# Patient Record
Sex: MALE | Race: WHITE | NOT HISPANIC OR LATINO | Employment: OTHER | URBAN - METROPOLITAN AREA
[De-identification: names, ages, dates, MRNs, and addresses within clinical notes are randomized per-mention and may not be internally consistent; named-entity substitution may affect disease eponyms.]

---

## 2018-02-07 ENCOUNTER — CONVERSION ENCOUNTER (OUTPATIENT)
Dept: FAMILY MEDICINE CLINIC | Facility: CLINIC | Age: 68
End: 2018-02-07

## 2018-02-07 ENCOUNTER — OFFICE VISIT CONVERTED (OUTPATIENT)
Dept: FAMILY MEDICINE CLINIC | Facility: CLINIC | Age: 68
End: 2018-02-07
Attending: NURSE PRACTITIONER

## 2018-02-13 ENCOUNTER — OFFICE VISIT CONVERTED (OUTPATIENT)
Dept: FAMILY MEDICINE CLINIC | Facility: CLINIC | Age: 68
End: 2018-02-13
Attending: FAMILY MEDICINE

## 2018-03-01 ENCOUNTER — OFFICE VISIT CONVERTED (OUTPATIENT)
Dept: FAMILY MEDICINE CLINIC | Facility: CLINIC | Age: 68
End: 2018-03-01
Attending: FAMILY MEDICINE

## 2018-03-27 ENCOUNTER — OFFICE VISIT CONVERTED (OUTPATIENT)
Dept: FAMILY MEDICINE CLINIC | Facility: CLINIC | Age: 68
End: 2018-03-27
Attending: FAMILY MEDICINE

## 2018-03-27 ENCOUNTER — CONVERSION ENCOUNTER (OUTPATIENT)
Dept: FAMILY MEDICINE CLINIC | Facility: CLINIC | Age: 68
End: 2018-03-27

## 2018-04-04 ENCOUNTER — OFFICE VISIT CONVERTED (OUTPATIENT)
Dept: GASTROENTEROLOGY | Facility: CLINIC | Age: 68
End: 2018-04-04
Attending: PHYSICIAN ASSISTANT

## 2018-08-21 ENCOUNTER — OFFICE VISIT CONVERTED (OUTPATIENT)
Dept: FAMILY MEDICINE CLINIC | Facility: CLINIC | Age: 68
End: 2018-08-21
Attending: FAMILY MEDICINE

## 2018-09-04 ENCOUNTER — CONVERSION ENCOUNTER (OUTPATIENT)
Dept: ORTHOPEDIC SURGERY | Facility: CLINIC | Age: 68
End: 2018-09-04

## 2018-09-04 ENCOUNTER — OFFICE VISIT CONVERTED (OUTPATIENT)
Dept: ORTHOPEDIC SURGERY | Facility: CLINIC | Age: 68
End: 2018-09-04
Attending: ORTHOPAEDIC SURGERY

## 2018-11-08 ENCOUNTER — OFFICE VISIT CONVERTED (OUTPATIENT)
Dept: ORTHOPEDIC SURGERY | Facility: CLINIC | Age: 68
End: 2018-11-08
Attending: ORTHOPAEDIC SURGERY

## 2019-02-19 ENCOUNTER — OFFICE VISIT CONVERTED (OUTPATIENT)
Dept: FAMILY MEDICINE CLINIC | Facility: CLINIC | Age: 69
End: 2019-02-19
Attending: FAMILY MEDICINE

## 2019-02-19 ENCOUNTER — HOSPITAL ENCOUNTER (OUTPATIENT)
Dept: FAMILY MEDICINE CLINIC | Facility: CLINIC | Age: 69
Discharge: HOME | End: 2019-02-19
Attending: FAMILY MEDICINE

## 2019-02-19 LAB
ALBUMIN SERPL-MCNC: 4.5 G/DL (ref 3.5–5)
ALBUMIN/GLOB SERPL: 1.7 {RATIO} (ref 1.4–2.6)
ALP SERPL-CCNC: 66 U/L (ref 56–155)
ALT SERPL-CCNC: 29 U/L (ref 10–40)
ANION GAP SERPL CALC-SCNC: 16 MMOL/L (ref 8–19)
APPEARANCE UR: CLEAR
AST SERPL-CCNC: 23 U/L (ref 15–50)
BASOPHILS # BLD AUTO: 0.02 10*3/UL (ref 0–0.2)
BASOPHILS NFR BLD AUTO: 0.4 % (ref 0–3)
BILIRUB SERPL-MCNC: 0.61 MG/DL (ref 0.2–1.3)
BILIRUB UR QL STRIP: NEGATIVE
BUN SERPL-MCNC: 12 MG/DL (ref 5–25)
BUN/CREAT SERPL: 13 {RATIO} (ref 6–20)
CALCIUM SERPL-MCNC: 9.3 MG/DL (ref 8.7–10.4)
CHLORIDE SERPL-SCNC: 103 MMOL/L (ref 99–111)
CHOLEST SERPL-MCNC: 137 MG/DL (ref 107–200)
CHOLEST/HDLC SERPL: 3.4 {RATIO} (ref 3–6)
COLOR UR: YELLOW
CONV ABS IMM GRAN: 0.02 10*3/UL (ref 0–0.2)
CONV CO2: 28 MMOL/L (ref 22–32)
CONV COLLECTION SOURCE (UA): ABNORMAL
CONV IMMATURE GRAN: 0.4 % (ref 0–1.8)
CONV TOTAL PROTEIN: 7.1 G/DL (ref 6.3–8.2)
CONV UROBILINOGEN IN URINE BY AUTOMATED TEST STRIP: 0.2 {EHRLICHU}/DL (ref 0.1–1)
CREAT UR-MCNC: 0.93 MG/DL (ref 0.7–1.2)
DEPRECATED RDW RBC AUTO: 45.1 FL (ref 35.1–43.9)
EOSINOPHIL # BLD AUTO: 0.23 10*3/UL (ref 0–0.7)
EOSINOPHIL # BLD AUTO: 4.4 % (ref 0–7)
ERYTHROCYTE [DISTWIDTH] IN BLOOD BY AUTOMATED COUNT: 12.5 % (ref 11.6–14.4)
GFR SERPLBLD BASED ON 1.73 SQ M-ARVRAT: >60 ML/MIN/{1.73_M2}
GLOBULIN UR ELPH-MCNC: 2.6 G/DL (ref 2–3.5)
GLUCOSE SERPL-MCNC: 109 MG/DL (ref 70–99)
GLUCOSE UR QL: NEGATIVE MG/DL
HBA1C MFR BLD: 14.8 G/DL (ref 14–18)
HCT VFR BLD AUTO: 46.3 % (ref 42–52)
HDLC SERPL-MCNC: 40 MG/DL (ref 40–60)
HGB UR QL STRIP: ABNORMAL
KETONES UR QL STRIP: NEGATIVE MG/DL
LDLC SERPL CALC-MCNC: 71 MG/DL (ref 70–100)
LYMPHOCYTES # BLD AUTO: 1.19 10*3/UL (ref 1–5)
Lab: NEGATIVE
MCH RBC QN AUTO: 30.9 PG (ref 27–31)
MCHC RBC AUTO-ENTMCNC: 32 G/DL (ref 33–37)
MCV RBC AUTO: 96.7 FL (ref 80–96)
MONOCYTES # BLD AUTO: 0.38 10*3/UL (ref 0.2–1.2)
MONOCYTES NFR BLD AUTO: 7.3 % (ref 3–10)
NEUTROPHILS # BLD AUTO: 3.39 10*3/UL (ref 2–8)
NEUTROPHILS NFR BLD AUTO: 64.7 % (ref 30–85)
NITRITE UR QL STRIP: NEGATIVE
NRBC CBCN: 0 % (ref 0–0.7)
OSMOLALITY SERPL CALC.SUM OF ELEC: 294 MOSM/KG (ref 273–304)
PH UR: 6.5 [PH] (ref 5–8)
PLATELET # BLD AUTO: 189 10*3/UL (ref 130–400)
PMV BLD AUTO: 11 FL (ref 9.4–12.4)
POTASSIUM SERPL-SCNC: 5 MMOL/L (ref 3.5–5.3)
PROT UR-MCNC: NEGATIVE MG/DL
RBC # BLD AUTO: 4.79 10*6/UL (ref 4.7–6.1)
SODIUM SERPL-SCNC: 142 MMOL/L (ref 135–147)
SP GR UR: 1.01 (ref 1–1.03)
TRIGL SERPL-MCNC: 130 MG/DL (ref 40–150)
TSH SERPL-ACNC: 2.76 M[IU]/L (ref 0.27–4.2)
URATE SERPL-MCNC: 6.1 MG/DL (ref 3.5–8.5)
VARIANT LYMPHS NFR BLD MANUAL: 22.8 % (ref 20–45)
VLDLC SERPL-MCNC: 26 MG/DL (ref 5–37)
WBC # BLD AUTO: 5.23 10*3/UL (ref 4.8–10.8)

## 2019-03-19 ENCOUNTER — OFFICE VISIT CONVERTED (OUTPATIENT)
Dept: FAMILY MEDICINE CLINIC | Facility: CLINIC | Age: 69
End: 2019-03-19
Attending: FAMILY MEDICINE

## 2019-03-19 ENCOUNTER — HOSPITAL ENCOUNTER (OUTPATIENT)
Dept: FAMILY MEDICINE CLINIC | Facility: CLINIC | Age: 69
Discharge: HOME | End: 2019-03-19
Attending: FAMILY MEDICINE

## 2019-03-19 ENCOUNTER — CONVERSION ENCOUNTER (OUTPATIENT)
Dept: FAMILY MEDICINE CLINIC | Facility: CLINIC | Age: 69
End: 2019-03-19

## 2019-03-19 LAB
EST. AVERAGE GLUCOSE BLD GHB EST-MCNC: 108 MG/DL
HBA1C MFR BLD: 5.4 % (ref 3.5–5.7)

## 2019-08-19 ENCOUNTER — HOSPITAL ENCOUNTER (OUTPATIENT)
Dept: FAMILY MEDICINE CLINIC | Facility: CLINIC | Age: 69
Discharge: HOME | End: 2019-08-19
Attending: FAMILY MEDICINE

## 2019-08-19 ENCOUNTER — OFFICE VISIT CONVERTED (OUTPATIENT)
Dept: FAMILY MEDICINE CLINIC | Facility: CLINIC | Age: 69
End: 2019-08-19
Attending: FAMILY MEDICINE

## 2019-08-19 LAB
ALBUMIN SERPL-MCNC: 4.6 G/DL (ref 3.5–5)
ALBUMIN/GLOB SERPL: 1.8 {RATIO} (ref 1.4–2.6)
ALP SERPL-CCNC: 69 U/L (ref 56–155)
ALT SERPL-CCNC: 29 U/L (ref 10–40)
ANION GAP SERPL CALC-SCNC: 21 MMOL/L (ref 8–19)
AST SERPL-CCNC: 20 U/L (ref 15–50)
BASOPHILS # BLD AUTO: 0.02 10*3/UL (ref 0–0.2)
BASOPHILS NFR BLD AUTO: 0.3 % (ref 0–3)
BILIRUB SERPL-MCNC: 0.52 MG/DL (ref 0.2–1.3)
BUN SERPL-MCNC: 14 MG/DL (ref 5–25)
BUN/CREAT SERPL: 16 {RATIO} (ref 6–20)
CALCIUM SERPL-MCNC: 9.5 MG/DL (ref 8.7–10.4)
CHLORIDE SERPL-SCNC: 100 MMOL/L (ref 99–111)
CHOLEST SERPL-MCNC: 141 MG/DL (ref 107–200)
CHOLEST/HDLC SERPL: 3.4 {RATIO} (ref 3–6)
CONV ABS IMM GRAN: 0.05 10*3/UL (ref 0–0.2)
CONV CO2: 24 MMOL/L (ref 22–32)
CONV IMMATURE GRAN: 0.8 % (ref 0–1.8)
CONV TOTAL PROTEIN: 7.1 G/DL (ref 6.3–8.2)
CREAT UR-MCNC: 0.87 MG/DL (ref 0.7–1.2)
DEPRECATED RDW RBC AUTO: 46.1 FL (ref 35.1–43.9)
EOSINOPHIL # BLD AUTO: 0.2 10*3/UL (ref 0–0.7)
EOSINOPHIL # BLD AUTO: 3.1 % (ref 0–7)
ERYTHROCYTE [DISTWIDTH] IN BLOOD BY AUTOMATED COUNT: 13.1 % (ref 11.6–14.4)
GFR SERPLBLD BASED ON 1.73 SQ M-ARVRAT: >60 ML/MIN/{1.73_M2}
GLOBULIN UR ELPH-MCNC: 2.5 G/DL (ref 2–3.5)
GLUCOSE SERPL-MCNC: 104 MG/DL (ref 70–99)
HCT VFR BLD AUTO: 46 % (ref 42–52)
HDLC SERPL-MCNC: 41 MG/DL (ref 40–60)
HGB BLD-MCNC: 15.1 G/DL (ref 14–18)
LDLC SERPL CALC-MCNC: 72 MG/DL (ref 70–100)
LYMPHOCYTES # BLD AUTO: 1.38 10*3/UL (ref 1–5)
LYMPHOCYTES NFR BLD AUTO: 21.3 % (ref 20–45)
MCH RBC QN AUTO: 32.2 PG (ref 27–31)
MCHC RBC AUTO-ENTMCNC: 32.8 G/DL (ref 33–37)
MCV RBC AUTO: 98.1 FL (ref 80–96)
MONOCYTES # BLD AUTO: 0.48 10*3/UL (ref 0.2–1.2)
MONOCYTES NFR BLD AUTO: 7.4 % (ref 3–10)
NEUTROPHILS # BLD AUTO: 4.34 10*3/UL (ref 2–8)
NEUTROPHILS NFR BLD AUTO: 67.1 % (ref 30–85)
NRBC CBCN: 0 % (ref 0–0.7)
OSMOLALITY SERPL CALC.SUM OF ELEC: 291 MOSM/KG (ref 273–304)
PLATELET # BLD AUTO: 164 10*3/UL (ref 130–400)
PMV BLD AUTO: 11.1 FL (ref 9.4–12.4)
POTASSIUM SERPL-SCNC: 4.9 MMOL/L (ref 3.5–5.3)
RBC # BLD AUTO: 4.69 10*6/UL (ref 4.7–6.1)
SODIUM SERPL-SCNC: 140 MMOL/L (ref 135–147)
TRIGL SERPL-MCNC: 141 MG/DL (ref 40–150)
VLDLC SERPL-MCNC: 28 MG/DL (ref 5–37)
WBC # BLD AUTO: 6.47 10*3/UL (ref 4.8–10.8)

## 2020-03-03 ENCOUNTER — OFFICE VISIT CONVERTED (OUTPATIENT)
Dept: FAMILY MEDICINE CLINIC | Facility: CLINIC | Age: 70
End: 2020-03-03
Attending: FAMILY MEDICINE

## 2020-03-03 ENCOUNTER — HOSPITAL ENCOUNTER (OUTPATIENT)
Dept: FAMILY MEDICINE CLINIC | Facility: CLINIC | Age: 70
Discharge: HOME | End: 2020-03-03
Attending: FAMILY MEDICINE

## 2020-03-03 ENCOUNTER — CONVERSION ENCOUNTER (OUTPATIENT)
Dept: FAMILY MEDICINE CLINIC | Facility: CLINIC | Age: 70
End: 2020-03-03

## 2020-03-03 LAB
ALBUMIN SERPL-MCNC: 4.6 G/DL (ref 3.5–5)
ALBUMIN/GLOB SERPL: 1.8 {RATIO} (ref 1.4–2.6)
ALP SERPL-CCNC: 61 U/L (ref 56–155)
ALT SERPL-CCNC: 35 U/L (ref 10–40)
ANION GAP SERPL CALC-SCNC: 18 MMOL/L (ref 8–19)
AST SERPL-CCNC: 21 U/L (ref 15–50)
BASOPHILS # BLD AUTO: 0.03 10*3/UL (ref 0–0.2)
BASOPHILS NFR BLD AUTO: 0.5 % (ref 0–3)
BILIRUB SERPL-MCNC: 0.68 MG/DL (ref 0.2–1.3)
BUN SERPL-MCNC: 15 MG/DL (ref 5–25)
BUN/CREAT SERPL: 14 {RATIO} (ref 6–20)
CALCIUM SERPL-MCNC: 9.7 MG/DL (ref 8.7–10.4)
CHLORIDE SERPL-SCNC: 99 MMOL/L (ref 99–111)
CHOLEST SERPL-MCNC: 151 MG/DL (ref 107–200)
CHOLEST/HDLC SERPL: 4.2 {RATIO} (ref 3–6)
CK SERPL-CCNC: 54 U/L (ref 57–374)
CONV ABS IMM GRAN: 0.02 10*3/UL (ref 0–0.2)
CONV CO2: 27 MMOL/L (ref 22–32)
CONV IMMATURE GRAN: 0.3 % (ref 0–1.8)
CONV TOTAL PROTEIN: 7.1 G/DL (ref 6.3–8.2)
CREAT UR-MCNC: 1.04 MG/DL (ref 0.7–1.2)
DEPRECATED RDW RBC AUTO: 43 FL (ref 35.1–43.9)
EOSINOPHIL # BLD AUTO: 0.34 10*3/UL (ref 0–0.7)
EOSINOPHIL # BLD AUTO: 5.4 % (ref 0–7)
ERYTHROCYTE [DISTWIDTH] IN BLOOD BY AUTOMATED COUNT: 12.3 % (ref 11.6–14.4)
GFR SERPLBLD BASED ON 1.73 SQ M-ARVRAT: >60 ML/MIN/{1.73_M2}
GLOBULIN UR ELPH-MCNC: 2.5 G/DL (ref 2–3.5)
GLUCOSE SERPL-MCNC: 108 MG/DL (ref 70–99)
HCT VFR BLD AUTO: 45.3 % (ref 42–52)
HDLC SERPL-MCNC: 36 MG/DL (ref 40–60)
HGB BLD-MCNC: 15.1 G/DL (ref 14–18)
LDLC SERPL CALC-MCNC: 73 MG/DL (ref 70–100)
LYMPHOCYTES # BLD AUTO: 1.33 10*3/UL (ref 1–5)
LYMPHOCYTES NFR BLD AUTO: 21.2 % (ref 20–45)
MAGNESIUM SERPL-MCNC: 2.15 MG/DL (ref 1.6–2.3)
MCH RBC QN AUTO: 31.8 PG (ref 27–31)
MCHC RBC AUTO-ENTMCNC: 33.3 G/DL (ref 33–37)
MCV RBC AUTO: 95.4 FL (ref 80–96)
MONOCYTES # BLD AUTO: 0.43 10*3/UL (ref 0.2–1.2)
MONOCYTES NFR BLD AUTO: 6.8 % (ref 3–10)
NEUTROPHILS # BLD AUTO: 4.13 10*3/UL (ref 2–8)
NEUTROPHILS NFR BLD AUTO: 65.8 % (ref 30–85)
NRBC CBCN: 0 % (ref 0–0.7)
OSMOLALITY SERPL CALC.SUM OF ELEC: 289 MOSM/KG (ref 273–304)
PLATELET # BLD AUTO: 160 10*3/UL (ref 130–400)
PMV BLD AUTO: 10.6 FL (ref 9.4–12.4)
POTASSIUM SERPL-SCNC: 4.7 MMOL/L (ref 3.5–5.3)
RBC # BLD AUTO: 4.75 10*6/UL (ref 4.7–6.1)
SODIUM SERPL-SCNC: 139 MMOL/L (ref 135–147)
T4 FREE SERPL-MCNC: 1.3 NG/DL (ref 0.9–1.8)
TRIGL SERPL-MCNC: 208 MG/DL (ref 40–150)
TSH SERPL-ACNC: 3.81 M[IU]/L (ref 0.27–4.2)
URATE SERPL-MCNC: 6.5 MG/DL (ref 3.5–8.5)
VLDLC SERPL-MCNC: 42 MG/DL (ref 5–37)
WBC # BLD AUTO: 6.28 10*3/UL (ref 4.8–10.8)

## 2020-03-16 ENCOUNTER — OFFICE VISIT CONVERTED (OUTPATIENT)
Dept: FAMILY MEDICINE CLINIC | Facility: CLINIC | Age: 70
End: 2020-03-16
Attending: FAMILY MEDICINE

## 2020-06-25 ENCOUNTER — CONVERSION ENCOUNTER (OUTPATIENT)
Dept: FAMILY MEDICINE CLINIC | Facility: CLINIC | Age: 70
End: 2020-06-25

## 2020-06-25 ENCOUNTER — OFFICE VISIT CONVERTED (OUTPATIENT)
Dept: FAMILY MEDICINE CLINIC | Facility: CLINIC | Age: 70
End: 2020-06-25
Attending: FAMILY MEDICINE

## 2020-06-25 ENCOUNTER — HOSPITAL ENCOUNTER (OUTPATIENT)
Dept: FAMILY MEDICINE CLINIC | Facility: CLINIC | Age: 70
Discharge: HOME | End: 2020-06-25
Attending: FAMILY MEDICINE

## 2020-06-25 LAB — PSA SERPL-MCNC: 3.83 NG/ML (ref 0–4)

## 2020-06-30 ENCOUNTER — HOSPITAL ENCOUNTER (OUTPATIENT)
Dept: GASTROENTEROLOGY | Facility: HOSPITAL | Age: 70
Setting detail: HOSPITAL OUTPATIENT SURGERY
Discharge: HOME | End: 2020-06-30
Attending: INTERNAL MEDICINE

## 2020-06-30 LAB — SARS-COV-2 RNA SPEC QL NAA+PROBE: NOT DETECTED

## 2021-04-01 ENCOUNTER — OFFICE VISIT CONVERTED (OUTPATIENT)
Dept: FAMILY MEDICINE CLINIC | Facility: CLINIC | Age: 71
End: 2021-04-01
Attending: FAMILY MEDICINE

## 2021-04-01 ENCOUNTER — HOSPITAL ENCOUNTER (OUTPATIENT)
Dept: FAMILY MEDICINE CLINIC | Facility: CLINIC | Age: 71
Discharge: HOME | End: 2021-04-01
Attending: FAMILY MEDICINE

## 2021-04-01 LAB
ALBUMIN SERPL-MCNC: 4.5 G/DL (ref 3.5–5)
ALBUMIN/GLOB SERPL: 1.8 {RATIO} (ref 1.4–2.6)
ALP SERPL-CCNC: 66 U/L (ref 56–155)
ALT SERPL-CCNC: 22 U/L (ref 10–40)
ANION GAP SERPL CALC-SCNC: 14 MMOL/L (ref 8–19)
AST SERPL-CCNC: 18 U/L (ref 15–50)
BASOPHILS # BLD AUTO: 0.04 10*3/UL (ref 0–0.2)
BASOPHILS NFR BLD AUTO: 0.6 % (ref 0–3)
BILIRUB SERPL-MCNC: 0.62 MG/DL (ref 0.2–1.3)
BUN SERPL-MCNC: 11 MG/DL (ref 5–25)
BUN/CREAT SERPL: 11 {RATIO} (ref 6–20)
CALCIUM SERPL-MCNC: 9.1 MG/DL (ref 8.7–10.4)
CHLORIDE SERPL-SCNC: 103 MMOL/L (ref 99–111)
CHOLEST SERPL-MCNC: 162 MG/DL (ref 107–200)
CHOLEST/HDLC SERPL: 4.4 {RATIO} (ref 3–6)
CONV ABS IMM GRAN: 0.05 10*3/UL (ref 0–0.2)
CONV CO2: 26 MMOL/L (ref 22–32)
CONV IMMATURE GRAN: 0.7 % (ref 0–1.8)
CONV TOTAL PROTEIN: 7 G/DL (ref 6.3–8.2)
CREAT UR-MCNC: 1.01 MG/DL (ref 0.7–1.2)
DEPRECATED RDW RBC AUTO: 43.7 FL (ref 35.1–43.9)
EOSINOPHIL # BLD AUTO: 0.35 10*3/UL (ref 0–0.7)
EOSINOPHIL # BLD AUTO: 5.1 % (ref 0–7)
ERYTHROCYTE [DISTWIDTH] IN BLOOD BY AUTOMATED COUNT: 12.6 % (ref 11.6–14.4)
GFR SERPLBLD BASED ON 1.73 SQ M-ARVRAT: >60 ML/MIN/{1.73_M2}
GLOBULIN UR ELPH-MCNC: 2.5 G/DL (ref 2–3.5)
GLUCOSE SERPL-MCNC: 116 MG/DL (ref 70–99)
HCT VFR BLD AUTO: 44.9 % (ref 42–52)
HDLC SERPL-MCNC: 37 MG/DL (ref 40–60)
HGB BLD-MCNC: 14.7 G/DL (ref 14–18)
LDLC SERPL CALC-MCNC: 88 MG/DL (ref 70–100)
LYMPHOCYTES # BLD AUTO: 1.48 10*3/UL (ref 1–5)
LYMPHOCYTES NFR BLD AUTO: 21.8 % (ref 20–45)
MCH RBC QN AUTO: 30.9 PG (ref 27–31)
MCHC RBC AUTO-ENTMCNC: 32.7 G/DL (ref 33–37)
MCV RBC AUTO: 94.3 FL (ref 80–96)
MONOCYTES # BLD AUTO: 0.42 10*3/UL (ref 0.2–1.2)
MONOCYTES NFR BLD AUTO: 6.2 % (ref 3–10)
NEUTROPHILS # BLD AUTO: 4.46 10*3/UL (ref 2–8)
NEUTROPHILS NFR BLD AUTO: 65.6 % (ref 30–85)
NRBC CBCN: 0 % (ref 0–0.7)
OSMOLALITY SERPL CALC.SUM OF ELEC: 288 MOSM/KG (ref 273–304)
PLATELET # BLD AUTO: 152 10*3/UL (ref 130–400)
PMV BLD AUTO: 10.7 FL (ref 9.4–12.4)
POTASSIUM SERPL-SCNC: 4.3 MMOL/L (ref 3.5–5.3)
RBC # BLD AUTO: 4.76 10*6/UL (ref 4.7–6.1)
SODIUM SERPL-SCNC: 139 MMOL/L (ref 135–147)
TRIGL SERPL-MCNC: 185 MG/DL (ref 40–150)
URATE SERPL-MCNC: 6.5 MG/DL (ref 3.5–8.5)
VLDLC SERPL-MCNC: 37 MG/DL (ref 5–37)
WBC # BLD AUTO: 6.8 10*3/UL (ref 4.8–10.8)

## 2021-05-07 NOTE — PROGRESS NOTES
Progress Note      Patient Name: Arley Falk   Patient ID: [id number]   Sex: Male   YOB: 1950    Referring Provider: Alfred Church MD    Visit Date: August 21, 2018    Provider: Alfred Church MD   Location: [facility]   Location Address: [address] [place] [address]  [id number]   Location Phone: [phone]          Chief Complaint     here for refills and labs, also thinks he's having gout flare up.       History Of Present Illness  Arley Falk is a 67 year old /White male who presents for evaluation and treatment of:      needs refills on meds  pt has medial and posterior knee pain x 2 months- sudden onset- worsening symptoms- meds not helping  needs to have gout checked  HTN- controlled on med- no chest pain or headaches  gout- unknown control- need to check uric acid    xrays:no fxs       Past Medical History  Disease Name Date Onset Notes   Enlarged prostate --  --    Gout --  --    Hypertension, Benign Essential --  --          Past Surgical History  Procedure Name Date Notes   Leg Surgery --  --          Medication List  Name Date Started Instructions   allopurinol 100 mg oral tablet 02/13/2018 take 1 tablet (100 mg) by oral route once daily for 90 days   amlodipine 5 mg oral tablet 02/13/2018 take 1 tablet (5 mg) by oral route once daily for 90 days   Levitra 20 mg oral tablet 02/13/2018 take 1 tablet (20 mg) by oral route once daily as needed approximately 1 hour before sexual activity for 90 days   lisinopril 20 mg oral tablet 02/13/2018 take 1 tablet (20 mg) by oral route once daily for 90 days   Ventolin HFA 90 mcg/actuation inhalation HFA aerosol inhaler 02/07/2018 inhale 1 - 2 puffs (90 - 180 mcg) by inhalation route every 4-6 hours as needed for 10 days         Allergy List  Allergen Name Date Reaction Notes   NO KNOWN DRUG ALLERGIES --  --  --          Family Medical History  Disease Name Relative/Age Notes   Diabetes / --     Hyperlipidemia / Mother    Mother/    Hypertension / --          Social History  Finding Status Start/Stop Quantity Notes   Exercises regularly --  --/-- --  occasionally   Recreational Drug Use Never --/-- --  never used   Second hand smoke exposure Unknown --/-- --  no   Tobacco Former --/30 --  quit smoking when he was 30, quit chewing at age 64, in the past used other tobacco products         Review of Systems  · Constitutional  o Denies  o : fatigue, fever  · Cardiovascular  o Denies  o : chest pain, palpitations  · Respiratory  o Denies  o : shortness of breath, cough  · Gastrointestinal  o Denies  o : nausea, vomiting, diarrhea  · Musculoskeletal  o Admits  o : knee pain      Vitals  Date Time BP Position Site L\R Cuff Size HR RR TEMP(F) WT  HT  BMI kg/m2 BSA m2 O2 Sat HC       08/21/2018 08:23 /80 Sitting    84 - R  97.6 285lbs 0oz    96 %           Physical Examination  · Constitutional  o Appearance  o : well developed, well-nourished, in no acute distress  · Respiratory  o Respiratory Effort  o : breathing unlabored  o Auscultation of Lungs  o : clear to ascultation  · Cardiovascular  o Heart  o :   § Auscultation of Heart  § : regular rate and rhythm  o Peripheral Vascular System  o :   § Extremities  § : no edema  · Gastrointestinal  o Abdomen  o : soft, non-tender, non-distended, + bowel sounds, no hepatosplenomegaly, no masses palpated  · Musculoskeletal  o General  o :   § General Musculoskeletal  § : No joint swelling or deformity., Muscle tone, strength, and development grossly normal. Left knee mild swelling, tenderness medial and posterior knee, normal ROM, stable, no redness or warmth.  · Neurologic  o Gait and Station  o :   § Gait Screening  § : normal gait  · Psychiatric  o Mood and Affect  o : mood normal, affect appropriate          Assessment  · Essential hypertension     401.9/I10  · Gout     274.9/M10.9  · Left knee pain     719.46/M25.562  · Screening PSA (prostate specific  antigen)     V76.44/Z12.5      Plan  · Orders  o CBC with Auto Diff [facility] ([id number]) - 719.46/M25.562, 274.9/M10.9, 401.9/I10 - 08/21/2018  o CMP [facility] (75132) - 719.46/M25.562, 274.9/M10.9, 401.9/I10 - 08/21/2018  o Lipid Panel [facility] (83605) - 719.46/M25.562, 274.9/M10.9, 401.9/I10 - 08/21/2018  o TSH [facility] (04723) - 719.46/M25.562, 274.9/M10.9, 401.9/I10 - 08/21/2018  o ACO-39: Current medications updated and reviewed () - - 08/21/2018  o Xray knee left [facility] ([id number]-UR) - 719.46/M25.562, 274.9/M10.9, 401.9/I10 - 08/21/2018  o Uric Acid Serum [facility] (25431) - 719.46/M25.562, 274.9/M10.9, 401.9/I10 - 08/21/2018  o PSA Screening, Ultrasensitive, MEDICARE [facility] () - V76.44/Z12.5 - 08/21/2018  o ORTHOPEDIC CONSULTATION (ORTHO) - 719.46/M25.562 - 08/21/2018  · Medications  o Celebrex 200 mg oral capsule   SIG: take 1 capsule (200 mg) by oral route once daily for 10 days   DISP: (10) capsules with 0 refills  Prescribed on 08/21/2018     o allopurinol 100 mg oral tablet   SIG: take 1 tablet (100 mg) by oral route once daily for 90 days   DISP: (90) tablets with 1 refills  Adjusted on 08/21/2018     o amlodipine 5 mg oral tablet   SIG: take 1 tablet (5 mg) by oral route once daily for 90 days   DISP: (90) tablets with 1 refills  Adjusted on 08/21/2018     o lisinopril 20 mg oral tablet   SIG: take 1 tablet (20 mg) by oral route once daily for 90 days   DISP: (90) tablets with 1 refills  Adjusted on 08/21/2018     · Instructions  o Handouts were given to patient: diet.  o Take all medications as prescribed/directed.  o Patient instructed/educated on their diet and exercise program.  o Patient was educated/instructed on their diagnosis, treatment and medications prior to discharge from the clinic today.            Electronically Signed by: Alfred Church MD -Author on August 21, 2018 09:24:12 AM

## 2021-05-07 NOTE — PROGRESS NOTES
Progress Note      Patient Name: Arley Falk   Patient ID: [id number]   Sex: Male   YOB: 1950    Referring Provider: Alfred Church MD    Visit Date: August 19, 2019    Provider: Alfred Church MD   Location: [facility]   Location Address: [address] [place] [address] [place]  [id number]   Location Phone: [phone]          Chief Complaint     Refills and check up       History Of Present Illness  Arley Falk is a 68 year old /White male who presents for evaluation and treatment of:      need refills on meds  HTN- controlled on meds  arthritis pain is controlled on meds  pt tolerating meds well       Past Medical History  Disease Name Date Onset Notes   Enlarged prostate --  --    Gout --  --    Hypertension, Benign Essential --  --          Past Surgical History  Procedure Name Date Notes   Leg Surgery --  --          Medication List  Name Date Started Instructions   allopurinol 100 mg oral tablet 02/19/2019 take 1 tablet (100 mg) by oral route once daily for 90 days   amlodipine 5 mg oral tablet 02/19/2019 take 1 tablet (5 mg) by oral route once daily for 90 days   Flomax 0.4 mg oral capsule 08/12/2019 take 1 capsule (0.4 mg) by oral route once daily 1/2 hour following the same meal each day for 30 days   Levitra 20 mg oral tablet 02/19/2019 take 1 tablet (20 mg) by oral route once daily as needed approximately 1 hour before sexual activity for 90 days   lisinopril 20 mg oral tablet 02/19/2019 take 1 tablet (20 mg) by oral route once daily for 90 days   Mobic 7.5 mg oral tablet 02/19/2019 take 1 tablet (7.5 mg) by oral route once daily for 90 days         Allergy List  Allergen Name Date Reaction Notes   NO KNOWN DRUG ALLERGIES --  --  --          Family Medical History  Disease Name Relative/Age Notes   Hyperlipidemia Mother/   Mother   Hypertension  --    Diabetes  --          Social History  Finding Status Start/Stop Quantity Notes   Exercises  regularly --  --/-- --  occasionally   Recreational Drug Use Never --/-- --  never used   Second hand smoke exposure Unknown --/-- --  no   Tobacco Former --/30 --  quit smoking when he was 30, quit chewing at age 64, in the past used other tobacco products         Review of Systems  · Constitutional  o Denies  o : fatigue, fever  · Cardiovascular  o Denies  o : chest pain, palpitations  · Respiratory  o Denies  o : shortness of breath, cough  · Gastrointestinal  o Denies  o : nausea, vomiting, diarrhea  · Psychiatric  o Denies  o : anxiety, depression      Vitals  Date Time BP Position Site L\R Cuff Size HR RR TEMP (F) WT  HT  BMI kg/m2 BSA m2 O2 Sat HC       08/19/2019 08:50 /80 Sitting    77 - R  97.8 287lbs 2oz    90 %          Physical Examination  · Constitutional  o Appearance  o : well developed, well-nourished, in no acute distress  · Respiratory  o Respiratory Effort  o : breathing unlabored  o Auscultation of Lungs  o : clear to ascultation  · Cardiovascular  o Heart  o :   § Auscultation of Heart  § : regular rate and rhythm  o Peripheral Vascular System  o :   § Extremities  § : no edema  · Gastrointestinal  o Abdomen  o : soft, non-tender, non-distended, + bowel sounds, no hepatosplenomegaly, no masses palpated  · Musculoskeletal  o General  o :   § General Musculoskeletal  § : No joint swelling or deformity., Muscle tone, strength, and development grossly normal.  · Neurologic  o Gait and Station  o :   § Gait Screening  § : normal gait  · Psychiatric  o Mood and Affect  o : mood normal, affect appropriate          Assessment  · Essential hypertension     401.9/I10      Plan  · Orders  o CBC with Auto Diff [facility] (05218) - 401.9/I10 - 08/19/2019  o CMP [facility] (87070) - 401.9/I10 - 08/19/2019  o Lipid Panel [facility] (88257) - 401.9/I10 - 08/19/2019  o ACO-39: Current medications updated and reviewed () - - 08/19/2019  · Medications  o allopurinol 100 mg oral tablet   SIG: take 1 tablet (100 mg) by oral  route once daily for 90 days   DISP: (90) tablets with 1 refills  Adjusted on 08/19/2019     o amlodipine 5 mg oral tablet   SIG: take 1 tablet (5 mg) by oral route once daily for 90 days   DISP: (90) tablets with 1 refills  Adjusted on 08/19/2019     o Flomax 0.4 mg oral capsule   SIG: take 1 capsule (0.4 mg) by oral route once daily 1/2 hour following the same meal each day for 30 days   DISP: (90) Capsule with 1 refills  Adjusted on 08/19/2019     o Levitra 20 mg oral tablet   SIG: take 1 tablet (20 mg) by oral route once daily as needed approximately 1 hour before sexual activity for 90 days   DISP: (30) tablets with 3 refills  Adjusted on 08/19/2019     o lisinopril 20 mg oral tablet   SIG: take 1 tablet (20 mg) by oral route once daily for 90 days   DISP: (90) tablets with 1 refills  Adjusted on 08/19/2019     o Mobic 7.5 mg oral tablet   SIG: take 1 tablet (7.5 mg) by oral route once daily for 90 days   DISP: (90) tablets with 1 refills  Adjusted on 08/19/2019     · Instructions  o Patient was educated/instructed on their diagnosis, treatment and medications prior to discharge from the clinic today.            Electronically Signed by: Alfred Church MD -Author on August 19, 2019 09:09:28 AM

## 2021-05-07 NOTE — PROGRESS NOTES
Progress Note      Patient Name: Arley Falk   Patient ID: [id number]   Sex: Male   YOB: 1950        Visit Date: February 7, 2018    Provider: Angelica Cain MD   Location: [facility]   Location Address: [address] [place] [address]  [id number]   Location Phone: [phone]          Chief Complaint     cough and congestion       History Of Present Illness  Arley Falk is a 67 year old /White male who presents for evaluation and treatment of:      not feeling well. He states he felt okay yesterday, but not so good today.     Started out as a sinus infection about a week and a half ago--had some head congestion and drainage, but that stopped. Now it feels like he is breathing okay, but he isn't getting any air. He has wheezing when he gets to coughing. He is not able to expectorate anything. He has had slight sore throat--but not a lot. He has had decreased appetite--no nausea, vomiting, or diarrhea. He hasn't had a fever.     He is a former smoker--quit 30 years ago--never been told that he had COPD, emphysema, or asthma. He does burn a wood stove at home. He has a CPAP at home--he is sleeping well.       Past Medical History  Disease Name Date Onset Notes   Enlarged prostate --  --    Gout --  --    Hypertension, Benign Essential --  --          Medication List  Name Date Started Instructions   allopurinol 100 mg oral tablet  take 1 tablet (100 mg) by oral route once daily for 90 days   amlodipine 5 mg oral tablet  take 1 tablet (5 mg) by oral route once daily for 90 days   lisinopril 20 mg oral tablet  take 1 tablet (20 mg) by oral route once daily for 90 days         Allergy List  Allergen Name Date Reaction Notes   NO KNOWN DRUG ALLERGIES --  --  --          Family Medical History  Disease Name Relative/Age Notes   Diabetes / --    Hyperlipidemia / Mother    Mother/    Hypertension / --          Social History  Finding Status Start/Stop  "Quantity Notes   Exercises regularly --  --/-- --  occasionally   Recreational Drug Use Never --/-- --  never used   Second hand smoke exposure Unknown --/-- --  no   Tobacco Former --/-- --  in the past used other tobacco products         Review of Systems  · Constitutional  o Admits  o : fatigue  o Denies  o : fever, chills, body aches  · HENT  o Admits  o : postnasal drip, sore throat  o Denies  o : headaches, vertigo, lightheadedness, sinus pain, nasal congestion, nasal discharge, tinnitus, ear pain, ear fullness  · Cardiovascular  o Denies  o : chest pain, irregular heart beats, syncope, dyspnea on exertion  · Respiratory  o Admits  o : wheezing, cough  o Denies  o : shortness of breath  · Gastrointestinal  o Admits  o : loss of appetite  o Denies  o : nausea, vomiting, diarrhea, abdominal pain  · Genitourinary  o Denies  o : dysuria, urinary retention  · Integument  o Denies  o : rash      Vitals  Date Time BP Position Site L\R Cuff Size HR RR TEMP(F) WT  HT  BMI kg/m2 BSA m2 O2 Sat        02/07/2018 02:11 /90 Sitting    82 - R  97.4 288lbs 0oz 5'  10\" 41.32 2.54 98 %           Physical Examination  · Constitutional  o Appearance  o : well developed, well-nourished, in no acute distress  · Eyes  o Conjunctivae  o : conjunctivae normal, no exudates present  o Pupils and Irises  o : pupils equal and round, pupils reactive to light bilaterally  · Ears, Nose, Mouth and Throat  o Ears  o :   § External Ears  § : auricle appearance normal bilaterally, no auricle tenderness to palpation present, external auditory canal appearance within normal limits  § Otoscopic Examination  § : tympanic membrane appearance within normal limits bilaterally  o Nose  o :   § External Nose  § : appearance normal  § Intranasal Exam  § : mucosa within normal limits, sinuses non tender to percussion  o Throat  o :   § Oropharynx  § : no inflammation or lesions present, tonsils within normal limits; moderate drainage in the " posterior oropharynx  · Neck  o Inspection/Palpation  o : normal appearance, no masses or tenderness, trachea midline  · Respiratory  o Respiratory Effort  o : breathing unlabored  o Auscultation of Lungs  o : scant, scattered expiratory wheezes, but otherwise CTA  · Cardiovascular  o Heart  o :   § Auscultation of Heart  § : regular rate and rhythm  o Peripheral Vascular System  o :   § Extremities  § : no edema  · Lymphatic  o Neck  o : no lymphadenopathy present  · Musculoskeletal  o General  o :   § General Musculoskeletal  § : No joint swelling or deformity., Muscle tone, strength, and development grossly normal.  · Skin and Subcutaneous Tissue  o General Inspection  o : no lesions present, no areas of discoloration, skin turgor normal, texture normal  · Neurologic  o Gait and Station  o :   § Gait Screening  § : normal gait  · Psychiatric  o Mood and Affect  o : mood normal, affect appropriate              Assessment  · Bronchitis, acute     466.0/J20.9      Plan  · Orders  o ACO-39: Current medications updated and reviewed () - - 02/07/2018  o Influenza immunization was not ordered or administered for reasons documented by clinician () - - 02/07/2018   Out of stock  · Medications  o azithromycin 250 mg oral tablet   SIG: take 2 tablets (500 mg) by oral route once daily for 1 day then 1 tablet (250 mg) by oral route once daily for 4 days   DISP: (6) tablets with 0 refills  Prescribed on 02/07/2018     o Medrol (Anthony) 4 mg oral tablets,dose pack   SIG: take as directed for 6 days   DISP: (1) 21 ct dose-pack with 0 refills  Prescribed on 02/07/2018     o Ventolin HFA 90 mcg/actuation inhalation HFA aerosol inhaler   SIG: inhale 1 - 2 puffs (90 - 180 mcg) by inhalation route every 4-6 hours as needed for 10 days   DISP: (1) 8 gm canister with 0 refills  Prescribed on 02/07/2018     · Instructions  o Take all medications as prescribed/directed.  o Rest. Increase Fluids.  o Patient was educated/instructed  on their diagnosis, treatment and medications prior to discharge from the clinic today. Advised that he follow up in the office if no improvement in symptoms and we would consider CXR.  o Chronic conditions reviewed and taken into consideration for today's treatment plan.  · Disposition  o Call or Return if symptoms worsen or persist.            Electronically Signed by: BRADFORD Cooper -Author on February 7, 2018 02:40:24 PM

## 2021-05-07 NOTE — PROGRESS NOTES
Progress Note      Patient Name: Arley Falk   Patient ID: [id number]   Sex: Male   YOB: 1950    Referring Provider: Alfred Church MD    Visit Date: March 19, 2019    Provider: Alfred Church MD   Location: [facility] [place]   Location Address: [address] [place] [address]  [id number]   Location Phone: [phone]          Chief Complaint     follow up for abnormal lab letter       History Of Present Illness  Arley Falk is a 68 year old /White male who presents for evaluation and treatment of:      discussed labs  blood glucose slightly elevated- need to get HGM A1C- counseled on diet and exercise  pt has had allergic rhinitis x several weeks       Past Medical History  Disease Name Date Onset Notes   Enlarged prostate --  --    Gout --  --    Hypertension, Benign Essential --  --          Past Surgical History  Procedure Name Date Notes   Leg Surgery --  --          Medication List  Name Date Started Instructions   allopurinol 100 mg oral tablet 02/19/2019 take 1 tablet (100 mg) by oral route once daily for 90 days   amlodipine 5 mg oral tablet 02/19/2019 take 1 tablet (5 mg) by oral route once daily for 90 days   Flomax 0.4 mg oral capsule 02/19/2019 take 1 capsule (0.4 mg) by oral route once daily 1/2 hour following the same meal each day for 30 days   Levitra 20 mg oral tablet 02/19/2019 take 1 tablet (20 mg) by oral route once daily as needed approximately 1 hour before sexual activity for 90 days   lisinopril 20 mg oral tablet 02/19/2019 take 1 tablet (20 mg) by oral route once daily for 90 days   Mobic 7.5 mg oral tablet 02/19/2019 take 1 tablet (7.5 mg) by oral route once daily for 90 days   Ventolin HFA 90 mcg/actuation inhalation HFA aerosol inhaler 02/19/2019 inhale 1 - 2 puffs (90 - 180 mcg) by inhalation route every 4-6 hours as needed for 90 days         Allergy List  Allergen Name Date Reaction Notes   NO KNOWN DRUG ALLERGIES --  --   "--          Family Medical History  Disease Name Relative/Age Notes   Hyperlipidemia Mother/   Mother   Hypertension  --    Diabetes  --          Social History  Finding Status Start/Stop Quantity Notes   Exercises regularly --  --/-- --  occasionally   Recreational Drug Use Never --/-- --  never used   Second hand smoke exposure Unknown --/-- --  no   Tobacco Former --/30 --  quit smoking when he was 30, quit chewing at age 64, in the past used other tobacco products         Review of Systems  · Constitutional  o Denies  o : fatigue, fever  · HENT  o Admits  o : nasal congestion, nasal discharge  o Denies  o : sore throat  · Cardiovascular  o Denies  o : chest pain, palpitations  · Respiratory  o Denies  o : shortness of breath, cough  · Gastrointestinal  o Denies  o : nausea, vomiting, diarrhea      Vitals  Date Time BP Position Site L\R Cuff Size HR RR TEMP (F) WT  HT  BMI kg/m2 BSA m2 O2 Sat HC       03/19/2019 08:06 /84 Sitting    77 - R  97 283lbs 6oz 5'  10\" 40.66 2.52 98 %          Physical Examination  · Constitutional  o Appearance  o : well developed, well-nourished, in no acute distress  · Head and Face  o HEENT  o : Unremarkable  · Respiratory  o Respiratory Effort  o : breathing unlabored  o Auscultation of Lungs  o : clear to ascultation  · Cardiovascular  o Heart  o :   § Auscultation of Heart  § : regular rate and rhythm  o Peripheral Vascular System  o :   § Extremities  § : no edema  · Gastrointestinal  o Abdomen  o : soft, non-tender, non-distended, + bowel sounds, no hepatosplenomegaly, no masses palpated  · Musculoskeletal  o General  o :   § General Musculoskeletal  § : No joint swelling or deformity., Muscle tone, strength, and development grossly normal.  · Neurologic  o Gait and Station  o :   § Gait Screening  § : normal gait  · Psychiatric  o Mood and Affect  o : mood normal, affect appropriate          Assessment  · Elevated random blood glucose level     790.29/R73.09  · Allergic " rhinitis     477.9/J30.9      Plan  · Orders  o Hgb A1c [facility] ([id number]) - 790.29/R73.09 - 03/19/2019  o ACO-39: Current medications updated and reviewed () - - 03/19/2019  · Medications  o Singulair 10 mg oral tablet   SIG: take 1 tablet (10 mg) by oral route once daily in the evening for 90 days   DISP: (90) tablets with 1 refills  Prescribed on 03/19/2019     · Instructions  o Handouts were given to patient: diet.  o Patient instructed/educated on their diet and exercise program.  o Patient was educated/instructed on their diagnosis, treatment and medications prior to discharge from the clinic today.            Electronically Signed by: Alfred Church MD -Author on March 19, 2019 08:23:16 AM

## 2021-05-07 NOTE — PROGRESS NOTES
Progress Note      Patient Name: Arley Falk   Patient ID: [id number]   Sex: Male   YOB: 1950        Visit Date: March 1, 2018    Provider: Alfred Church MD   Location: [facility], [place]   Location Address: [address] [place] [address]  [id number]   Location Phone: [phone]          Chief Complaint     follow up on labs       History Of Present Illness  Arley Falk is a 67 year old /White male who presents for evaluation and treatment of:      discussed labs  hyperlipidemia- slightly uncontrolled- pt watching diet more and exercising more now- discussed this  elevated liver enzyme  HTN- controlled  gave diet for cholesterol       Past Medical History  Disease Name Date Onset Notes   Enlarged prostate --  --    Gout --  --    Hypertension, Benign Essential --  --          Medication List  Name Date Started Instructions   allopurinol 100 mg oral tablet 02/13/2018 take 1 tablet (100 mg) by oral route once daily for 90 days   amlodipine 5 mg oral tablet 02/13/2018 take 1 tablet (5 mg) by oral route once daily for 90 days   Levitra 20 mg oral tablet 02/13/2018 take 1 tablet (20 mg) by oral route once daily as needed approximately 1 hour before sexual activity for 90 days   lisinopril 20 mg oral tablet 02/13/2018 take 1 tablet (20 mg) by oral route once daily for 90 days   Ventolin HFA 90 mcg/actuation inhalation HFA aerosol inhaler 02/07/2018 inhale 1 - 2 puffs (90 - 180 mcg) by inhalation route every 4-6 hours as needed for 10 days         Allergy List  Allergen Name Date Reaction Notes   NO KNOWN DRUG ALLERGIES --  --  --          Family Medical History  Disease Name Relative/Age Notes   Diabetes / --    Hyperlipidemia / Mother    Mother/    Hypertension / --          Social History  Finding Status Start/Stop Quantity Notes   Exercises regularly --  --/-- --  occasionally   Recreational Drug Use Never --/-- --  never used   Second hand smoke exposure  "Unknown --/-- --  no   Tobacco Former --/-- --  in the past used other tobacco products         Vitals  Date Time BP Position Site L\R Cuff Size HR RR TEMP(F) WT  HT  BMI kg/m2 BSA m2 O2 Sat HC       03/01/2018 08:41 /78 Sitting    83 - R  97.6 288lbs 4oz 5'  10\" 41.36 2.54 100 %           Physical Examination  · Constitutional  o Appearance  o : well developed, well-nourished, in no acute distress  · Eyes  o Conjunctivae  o : conjunctivae normal  · Neck  o Inspection/Palpation  o : supple  o Thyroid  o : no thyromegaly  · Respiratory  o Respiratory Effort  o : breathing unlabored  o Auscultation of Lungs  o : clear to ascultation  · Cardiovascular  o Heart  o :   § Auscultation of Heart  § : regular rate and rhythm  o Peripheral Vascular System  o :   § Extremities  § : no edema  · Lymphatic  o Neck  o : no lymphadenopathy present  · Musculoskeletal  o General  o :   § General Musculoskeletal  § : No joint swelling or deformity., Muscle tone, strength, and development grossly normal.  · Skin and Subcutaneous Tissue  o General Inspection  o : no lesions present, no areas of discoloration, skin turgor normal, texture normal  · Neurologic  o Gait and Station  o :   § Gait Screening  § : normal gait  · Psychiatric  o Mood and Affect  o : mood normal, affect appropriate          Assessment  · Hyperlipidemia     272.4/E78.5  · Hypertension, Benign Essential     401.1/I10  · Elevated liver enzymes     790.5/R74.8      Plan  · Orders  o ACO-39: Current medications updated and reviewed () - - 03/01/2018  o Influenza immunization was not ordered or administered for reasons documented by clinician () - - 03/01/2018  o Hepatic function panel (98663) - 401.1/I10, 272.4/E78.5, 790.5/R74.8 - 03/01/2018  o Hepatitis panel (HBsAg + ab, HBe ag + ab, HAV ab IgM + total, HBc ab, HCV ab) (59701, 28605, 68674, 30468, 07590) - 401.1/I10, 272.4/E78.5, 790.5/R74.8 - 03/01/2018  o US abdomen (60235, 38386) - 790.5/R74.8 - " 03/01/2018   need U/S of liver  · Instructions  o Advised that cheeses and other sources of dairy fats, animal fats, fast food, and the extras (candy, pasteries, pies, doughnuts and cookies) all contain LDL raising nutrients. Advised to increase fruits, vegetables, whole grains, and to monitor portion sizes.   o Handouts were given to patient: cholesterol  o Take all medications as prescribed/directed.  o Patient instructed/educated on their diet and exercise program.  o Patient was educated/instructed on their diagnosis, treatment and medications prior to discharge from the clinic today.            Electronically Signed by: Alfred Church MD -Author on March 1, 2018 09:39:58 AM

## 2021-05-07 NOTE — PROGRESS NOTES
Progress Note      Patient Name: Arley Falk   Patient ID: [id number]   Sex: Male   YOB: 1950    Referring Provider: Alfred Church MD    Visit Date: February 19, 2019    Provider: Alfred Church MD   Location: [facility]   Location Address: [address] [place] [address]  [id number]   Location Phone: [phone]          Chief Complaint     here for medication refills and labs       History Of Present Illness  Arley Falk is a 68 year old /White male who presents for evaluation and treatment of:      need refills on meds  HTN: controlled on meds  pt seeing urology for prostrate- pt has some mild decrease in urine stream  pt is on Mobic for knee pain- controlled on med       Past Medical History  Disease Name Date Onset Notes   Enlarged prostate --  --    Gout --  --    Hypertension, Benign Essential --  --          Past Surgical History  Procedure Name Date Notes   Leg Surgery --  --          Medication List  Name Date Started Instructions   allopurinol 100 mg oral tablet 08/21/2018 take 1 tablet (100 mg) by oral route once daily for 90 days   amlodipine 5 mg oral tablet 08/21/2018 take 1 tablet (5 mg) by oral route once daily for 90 days   Celebrex 200 mg oral capsule 08/21/2018 take 1 capsule (200 mg) by oral route once daily for 10 days   Levitra 20 mg oral tablet 02/13/2018 take 1 tablet (20 mg) by oral route once daily as needed approximately 1 hour before sexual activity for 90 days   lisinopril 20 mg oral tablet 08/21/2018 take 1 tablet (20 mg) by oral route once daily for 90 days   Ventolin HFA 90 mcg/actuation inhalation HFA aerosol inhaler 02/07/2018 inhale 1 - 2 puffs (90 - 180 mcg) by inhalation route every 4-6 hours as needed for 10 days         Allergy List  Allergen Name Date Reaction Notes   NO KNOWN DRUG ALLERGIES --  --  --          Family Medical History  Disease Name Relative/Age Notes   Diabetes / --    Hyperlipidemia /  Mother    Mother/    Hypertension / --          Social History  Finding Status Start/Stop Quantity Notes   Exercises regularly --  --/-- --  occasionally   Recreational Drug Use Never --/-- --  never used   Second hand smoke exposure Unknown --/-- --  no   Tobacco Former --/30 --  quit smoking when he was 30, quit chewing at age 64, in the past used other tobacco products         Vitals  Date Time BP Position Site L\R Cuff Size HR RR TEMP(F) WT  HT  BMI kg/m2 BSA m2 O2 Sat HC       02/19/2019 08:40 /80 Sitting    75 - R  97.4 283lbs 0oz    94 %           Physical Examination  · Constitutional  o Appearance  o : well developed, well-nourished, in no acute distress  · Head and Face  o HEENT  o : Unremarkable  · Respiratory  o Respiratory Effort  o : breathing unlabored  o Auscultation of Lungs  o : clear to ascultation  · Cardiovascular  o Heart  o :   § Auscultation of Heart  § : regular rate and rhythm  o Peripheral Vascular System  o :   § Extremities  § : no edema  · Gastrointestinal  o Abdomen  o : soft, non-tender, non-distended, + bowel sounds, no hepatosplenomegaly, no masses palpated  · Musculoskeletal  o General  o :   § General Musculoskeletal  § : No joint swelling or deformity., Muscle tone, strength, and development grossly normal.  · Neurologic  o Gait and Station  o :   § Gait Screening  § : normal gait  · Psychiatric  o Mood and Affect  o : mood normal, affect appropriate          Assessment  · BPH (benign prostatic hyperplasia)     600.00/N40.0  · Essential hypertension     401.9/I10  · Gout     274.9/M10.9  · Arthritis     716.90/M19.90      Plan  · Orders  o CBC with Auto Diff [facility] (12070) - 600.00/N40.0, 716.90/M19.90, 401.9/I10 - 02/19/2019  o CMP [facility] (77232) - 600.00/N40.0, 716.90/M19.90, 401.9/I10 - 02/19/2019  o Lipid Panel [facility] (20838) - 600.00/N40.0, 716.90/M19.90, 401.9/I10 - 02/19/2019  o TSH [facility] (81061) - 600.00/N40.0, 716.90/M19.90, 401.9/I10 - 02/19/2019  o IOP - Urinalysis without  Microscopy (Clinitek) [facility] (94860) - 600.00/N40.0, 716.90/M19.90, 401.9/I10 - 02/19/2019   GLU neg, JAVON neg, keT neg, SG 1.015, blO trace-intact, pH 6.5, PRO neg, URO 0.2 E.U./dL, NIT neg, JONATHON neg  o ACO-39: Current medications updated and reviewed () - - 02/19/2019  o Uric Acid Serum [facility] (79142) - 274.9/M10.9 - 02/19/2019  · Medications  o Flomax 0.4 mg oral capsule   SIG: take 1 capsule (0.4 mg) by oral route once daily 1/2 hour following the same meal each day for 30 days   DISP: (30) capsules with 5 refills  Prescribed on 02/19/2019     o Mobic 7.5 mg oral tablet   SIG: take 1 tablet (7.5 mg) by oral route once daily for 90 days   DISP: (90) tablets with 1 refills  Prescribed on 02/19/2019     o allopurinol 100 mg oral tablet   SIG: take 1 tablet (100 mg) by oral route once daily for 90 days   DISP: (90) tablets with 1 refills  Adjusted on 02/19/2019     o amlodipine 5 mg oral tablet   SIG: take 1 tablet (5 mg) by oral route once daily for 90 days   DISP: (90) tablets with 1 refills  Adjusted on 02/19/2019     o Levitra 20 mg oral tablet   SIG: take 1 tablet (20 mg) by oral route once daily as needed approximately 1 hour before sexual activity for 90 days   DISP: (30) tablets with 3 refills  Adjusted on 02/19/2019     o lisinopril 20 mg oral tablet   SIG: take 1 tablet (20 mg) by oral route once daily for 90 days   DISP: (90) tablets with 1 refills  Adjusted on 02/19/2019     o Ventolin HFA 90 mcg/actuation inhalation HFA aerosol inhaler   SIG: inhale 1 - 2 puffs (90 - 180 mcg) by inhalation route every 4-6 hours as needed for 90 days   DISP: (3) 8 gm canister with 3 refills  Adjusted on 02/19/2019     o Celebrex 200 mg oral capsule   SIG: take 1 capsule (200 mg) by oral route once daily for 10 days   DISP: (10) capsules with 0 refills  Discontinued on 02/19/2019     · Instructions  o Patient was educated/instructed on their diagnosis, treatment and medications prior to discharge from the clinic  today.            Electronically Signed by: Alfred Church MD -Author on February 19, 2019 09:17:05 AM

## 2021-05-07 NOTE — PROGRESS NOTES
Progress Note      Patient Name: Arley Falk   Patient ID: [id number]   Sex: Male   YOB: 1950    Referring Provider: Alfred Church MD    Visit Date: June 25, 2020    Provider: Alfred Church MD   Location: [facility]   Location Address: [address] [place] [address] [place]  [id number]   Location Phone: [phone]          Chief Complaint  · Annual Wellness Exam      History Of Present Illness  The patient is a 69 year old /White male who has come to this office for his Annual Wellness Visit.   His Primary Care Provider is Alfred Church MD. His comprehensive Care Team list, including suppliers, has been updated on the Facesheet. His medical/family history, height, weight, BMI, and blood pressure have been reviewed and are in the chart. The Health Risk Assessment has been completed and scanned in the chart.   Medications are listed in the medication list.   The active problem list includes: Enlarged prostate, Gout, and Hypertension, Benign Essential   The patient does have a history of substance use. This includes tobacco use and Quit 30 years ago.   Patient reports his diet is adequate.   The Mini-Cog has been administered and is scanned in chart. The results are negative. His cognitive function is without limitation.   A hearing loss screen was completed today and the result is negative.   Patient does not have any risk factors for depression. Patient completed the PHQ-9 today and it has been scanned in the chart. The total score is 1-4.   The Timed Up and Go screen was administered today and the result is negative.   The Joe Index of [place] in ADLs indicated full function (score of 6).   A Falls Risk Assessment has been completed, including a review of home fall hazards and medication review.   Overall, the patient's functional ability is noted by this provider to be within normal limits. His level of safety is noted to be within normal limits. His  balance/gait is within normal limits. There have been no falls in the past year. Patient-specific home safety recommendations have been reviewed and a copy has been given to patient.   He denies issues with leaking urine.   There are no additional risk factors identified.   Living Will/Advanced Directive has not previously been completed.   Personalized health advice was given to the patient and a written health screening schedule was established; see Plan for details.       Past Medical History  Disease Name Date Onset Notes   Enlarged prostate --  --    Gout --  --    Hypertension, Benign Essential --  --          Past Surgical History  Procedure Name Date Notes   Colonoscopy 01-19-17 --    Leg Surgery --  --          Medication List  Name Date Started Instructions   allopurinol 100 mg oral tablet 03/03/2020 take 1 tablet (100 mg) by oral route once daily for 90 days   amlodipine 5 mg oral tablet 03/03/2020 take 1 tablet (5 mg) by oral route once daily for 90 days   Fish Oil 1,000 mg (120 mg-180 mg) oral capsule 03/16/2020 take 2 capsules by oral route 2 times a day for 90 days   Flomax 0.4 mg oral capsule 03/03/2020 take 1 capsule (0.4 mg) by oral route once daily 1/2 hour following the same meal each day for 30 days   Levitra 20 mg oral tablet 08/19/2019 take 1 tablet (20 mg) by oral route once daily as needed approximately 1 hour before sexual activity for 90 days   lisinopril 20 mg oral tablet 03/03/2020 take 1 tablet (20 mg) by oral route once daily for 90 days   Mobic 7.5 mg oral tablet 03/03/2020 take 1 tablet (7.5 mg) by oral route once daily for 90 days   prednisone 20 mg oral tablet 03/16/2020 take 2 tablets by oral route daily for 5 days   Singulair 10 mg oral tablet 03/03/2020 take 1 tablet (10 mg) by oral route once daily in the evening for 90 days   vitamin E 600 unit oral capsule  take 1 capsule by oral route daily         Allergy List  Allergen Name Date Reaction Notes   NO KNOWN DRUG ALLERGIES --   "--  --        Allergies Reconciled  Family Medical History  Disease Name Relative/Age Notes   Hyperlipidemia Mother/   Mother   Hypertension  --    Diabetes  --          Social History  Finding Status Start/Stop Quantity Notes   Exercises regularly --  --/-- --  occasionally   Recreational Drug Use Never --/-- --  never used   Second hand smoke exposure Unknown --/-- --  no   Tobacco Former --/30 --  quit smoking when he was 30, quit chewing at age 64, in the past used other tobacco products         Immunizations  NameDate Admin Mfg Trade Name Lot Number Route Inj VIS Given VIS Publication   Alcciscou29/15/2019 UNK Unknown TradeName [id number] NE NE 03/16/2020    Comments: [facility]   Cfuajmwef2396/18/2020 MSD PNEUMOVAX 23 [id number] IM  03/18/2020    Comments: [facility]DC#[id number]         Review of Systems  · Constitutional  o Denies  o : fatigue, fever  · Cardiovascular  o Denies  o : chest pain, palpitations  · Respiratory  o Denies  o : shortness of breath, cough  · Psychiatric  o Denies  o : anxiety, depression      Vitals  Date Time BP Position Site L\R Cuff Size HR RR TEMP (F) WT  HT  BMI kg/m2 BSA m2 O2 Sat HC       06/25/2020 10:20 /80 Sitting    86 - R  99.1 285lbs 0oz 5'  9.5\" 41.48 2.52 96 %    06/25/2020 10:20 /80 Sitting    86 - R  99.1 285lbs 0oz 5'  9.5\" 41.48 2.52 96 %          Physical Examination  · Respiratory  o Respiratory Effort  o : breathing unlabored  o Auscultation of Lungs  o : clear to ascultation  · Cardiovascular  o Heart  o :   § Auscultation of Heart  § : regular rate and rhythm  · Gastrointestinal  o Abdomen  o : soft, non-tender, non-distended, + bowel sounds, no hepatosplenomegaly, no masses palpated  · Musculoskeletal  o General  o :   § General Musculoskeletal  § : No joint swelling or deformity., Muscle tone, strength, and development grossly normal.  · Neurologic  o Mental Status Examination  o :   § Orientation  § : grossly oriented to person, place and " time  o Gait and Station  o :   § Gait Screening  § : normal gait              Assessment  · Screening for alcoholism     V79.1/Z13.89  · Screening for depression     V79.0/Z13.89  · Encounter for Medicare annual wellness exam     V70.0/Z00.00  · Screening PSA (prostate specific antigen)     V76.44/Z12.5      Plan  · Orders  o Negative alcohol screening () - V79.1/Z13.89 - 06/25/2020  o ACO-18: Negative screen for clinical depression using a standardized tool () - V79.0/Z13.89 - 06/25/2020  o Falls Risk Assessment Completed (3288F) - V70.0/Z00.00 - 06/25/2020  o Brief hearing screening (written) [facility] () - V70.0/Z00.00 - 06/25/2020  o Annual wellness visit; includes a personalized prevention plan of service (pps), initial visit () - V70.0/Z00.00 - 06/25/2020  o ACO-13: Fall Risk Screening with no falls in past year or only one fall without injury in the past year (1101F) - V70.0/Z00.00 - 06/25/2020  o Presence or absence of urinary incontinence assessed (NELA) (1090F) - V70.0/Z00.00 - 06/25/2020  o ACO-39: Current medications updated and reviewed () - - 06/25/2020  o ACO-19: Colorectal cancer screening results documented and reviewed (3017F) - - 06/25/2020  o PSA Screening, Ultrasensitive, MEDICARE [facility] () - V76.44/Z12.5 - 06/25/2020  · Instructions  o Audit-C Questionnaire completed and scanned into the EMR under the designated folder within the patient's documents.  o Audit-C score of 0-4 - Negative Screen - Brief Discussion  o Depression Screen completed and scanned into the EMR under the designated folder within the patient's documents.  o PHQ-9 results between 1-4 indicate low-risk for Depression  o Health Risk Assessment has been reviewed with the patient.  o Written health screening schedule for next 5-10 years was established with patient; information scanned in chart and given/mailed to patient.  o Fall prevention methods discussed and a copy of recommendations given/mailed to  patient.            Electronically Signed by: Alfred Church MD -Author on June 25, 2020 10:53:07 AM

## 2021-05-07 NOTE — PROGRESS NOTES
Progress Note      Patient Name: Arley Falk   Patient ID: [id number]   Sex: Male   YOB: 1950    Referring Provider: Alfred Church MD    Visit Date: March 16, 2020    Provider: Alfred Church MD   Location: [facility]   Location Address: [address] [place] [address] [place]  [id number]   Location Phone: [phone]          Chief Complaint     lab follow up    throat scratchy, slight cough, chest tight started yesterday.       History Of Present Illness  Arley Falk is a 69 year old /White male who presents for evaluation and treatment of:      discussed labs  hyperlipidemia- uncontrolled- counseled on diet and exercise  cold symptoms x 2 days- sudden onset- worsening symptoms- not taking any meds for condition    discussed FDA blackbox warning for singulair for SI and depression- pt understands risk and wants to continue singulair    pt has intermittent bilateral thigh and knee pain when walking- occurring x 1.5yrs- no redness, warmth, swelling, bruising, no injuries       Past Medical History  Disease Name Date Onset Notes   Enlarged prostate --  --    Gout --  --    Hypertension, Benign Essential --  --          Past Surgical History  Procedure Name Date Notes   Colonoscopy 01-19-17 --    Leg Surgery --  --          Medication List  Name Date Started Instructions   allopurinol 100 mg oral tablet 03/03/2020 take 1 tablet (100 mg) by oral route once daily for 90 days   amlodipine 5 mg oral tablet 03/03/2020 take 1 tablet (5 mg) by oral route once daily for 90 days   Flomax 0.4 mg oral capsule 03/03/2020 take 1 capsule (0.4 mg) by oral route once daily 1/2 hour following the same meal each day for 30 days   Levitra 20 mg oral tablet 08/19/2019 take 1 tablet (20 mg) by oral route once daily as needed approximately 1 hour before sexual activity for 90 days   lisinopril 20 mg oral tablet 03/03/2020 take 1 tablet (20 mg) by oral route once daily for 90 days  "  Mobic 7.5 mg oral tablet 03/03/2020 take 1 tablet (7.5 mg) by oral route once daily for 90 days   Singulair 10 mg oral tablet 03/03/2020 take 1 tablet (10 mg) by oral route once daily in the evening for 90 days   vitamin E 600 unit oral capsule  take 1 capsule by oral route daily         Allergy List  Allergen Name Date Reaction Notes   NO KNOWN DRUG ALLERGIES --  --  --          Family Medical History  Disease Name Relative/Age Notes   Hyperlipidemia Mother/   Mother   Hypertension  --    Diabetes  --          Social History  Finding Status Start/Stop Quantity Notes   Exercises regularly --  --/-- --  occasionally   Recreational Drug Use Never --/-- --  never used   Second hand smoke exposure Unknown --/-- --  no   Tobacco Former --/30 --  quit smoking when he was 30, quit chewing at age 64, in the past used other tobacco products         Immunizations  NameDate Admin Mfg Trade Name Lot Number Route Inj VIS Given VIS Publication   Dhawpsxzs83/15/2019 UNK Unknown TradeName [id number] NE NE 03/16/2020    [facility]         Review of Systems  · Constitutional  o Denies  o : fatigue, fever  · HENT  o Admits  o : sinus pain, nasal congestion, nasal discharge, sore throat  · Cardiovascular  o Denies  o : chest pain, palpitations  · Respiratory  o Admits  o : cough  o Denies  o : shortness of breath  · Gastrointestinal  o Denies  o : nausea, vomiting, diarrhea  · Psychiatric  o Denies  o : anxiety, depression      Vitals  Date Time BP Position Site L\R Cuff Size HR RR TEMP (F) WT  HT  BMI kg/m2 BSA m2 O2 Sat        03/16/2020 10:58 /76 Sitting    97 - R  97.4 292lbs 0oz 5'  10\" 41.9 2.56 98 %          Physical Examination  · Constitutional  o Appearance  o : well developed, well-nourished, in no acute distress  · Head and Face  o HEENT  o : Unremarkable  · Respiratory  o Respiratory Effort  o : breathing unlabored  o Auscultation of Lungs  o : clear to " ascultation  · Cardiovascular  o Heart  o :   § Auscultation of Heart  § : regular rate and rhythm  o Peripheral Vascular System  o :   § Extremities  § : no edema  · Gastrointestinal  o Abdomen  o : soft, non-tender, non-distended, + bowel sounds, no hepatosplenomegaly, no masses palpated  · Musculoskeletal  o General  o :   § General Musculoskeletal  § : No joint swelling or deformity., Muscle tone, strength, and development grossly normal. Bilateral legs nontender, no redness, warmth, or swelling.  · Neurologic  o Gait and Station  o :   § Gait Screening  § : normal gait  · Psychiatric  o Mood and Affect  o : mood normal, affect appropriate          Assessment  · Hyperlipidemia     272.4/E78.5  · Leg pain     729.5/M79.606  · Allergic rhinitis     477.9/J30.9      Plan  · Orders  o ACO-14: Influenza immunization administered or previously received () - - 03/16/2020  o ACO-39: Current medications updated and reviewed () - - 03/16/2020  o PHYSICAL THERAPY CONSULTATION ([facility]) - 729.5/M79.606 - 03/16/2020  · Medications  o Fish Oil 1,000 mg (120 mg-180 mg) oral capsule   SIG: take 2 capsules by oral route 2 times a day for 90 days   DISP: (360) capsules with 1 refills  Prescribed on 03/16/2020     o prednisone 20 mg oral tablet   SIG: take 2 tablets by oral route daily for 5 days   DISP: (10) tablets with 0 refills  Prescribed on 03/16/2020     o Medications have been Reconciled  o Transition of Care or Provider Policy  · Instructions  o Advised that cheeses and other sources of dairy fats, animal fats, fast food, and the extras (candy, pasteries, pies, doughnuts and cookies) all contain LDL raising nutrients. Advised to increase fruits, vegetables, whole grains, and to monitor portion sizes.   o Patient was educated/instructed on their diagnosis, treatment and medications prior to discharge from the clinic today.            Electronically Signed by: Alfred Church MD -Author on March 16, 2020 11:41:47  AM

## 2021-05-07 NOTE — PROGRESS NOTES
Progress Note      Patient Name: Arley Falk   Patient ID: [id number]   Sex: Male   YOB: 1950        Visit Date: February 13, 2018    Provider: Alfred Church MD   Location: [facility]   Location Address: [address] [place] [address]  [id number]   Location Phone: [phone]          Chief Complaint     med refills  fasting labs       History Of Present Illness  Arley Falk is a 67 year old /White male who presents for evaluation and treatment of:      need refills on meds  HTN: controlled on meds  gout controlled on med- no recent attacks       Past Medical History  Disease Name Date Onset Notes   Enlarged prostate --  --    Gout --  --    Hypertension, Benign Essential --  --          Medication List  Name Date Started Instructions   allopurinol 100 mg oral tablet  take 1 tablet (100 mg) by oral route once daily for 90 days   amlodipine 5 mg oral tablet  take 1 tablet (5 mg) by oral route once daily for 90 days   azithromycin 250 mg oral tablet 02/07/2018 take 2 tablets (500 mg) by oral route once daily for 1 day then 1 tablet (250 mg) by oral route once daily for 4 days   lisinopril 20 mg oral tablet  take 1 tablet (20 mg) by oral route once daily for 90 days   Medrol (Anthony) 4 mg oral tablets,dose pack 02/07/2018 take as directed for 6 days   Ventolin HFA 90 mcg/actuation inhalation HFA aerosol inhaler 02/07/2018 inhale 1 - 2 puffs (90 - 180 mcg) by inhalation route every 4-6 hours as needed for 10 days         Allergy List  Allergen Name Date Reaction Notes   NO KNOWN DRUG ALLERGIES --  --  --          Family Medical History  Disease Name Relative/Age Notes   Diabetes / --    Hyperlipidemia / Mother    Mother/    Hypertension / --          Social History  Finding Status Start/Stop Quantity Notes   Exercises regularly --  --/-- --  occasionally   Recreational Drug Use Never --/-- --  never used   Second hand smoke exposure Unknown --/-- --  no    Tobacco Former --/-- --  in the past used other tobacco products         Review of Systems  · Constitutional  o Denies  o : fatigue, fever  · Cardiovascular  o Denies  o : chest pain, palpitations  · Respiratory  o Denies  o : shortness of breath, cough  · Gastrointestinal  o Denies  o : nausea, vomiting, diarrhea  · Psychiatric  o Denies  o : anxiety, depression      Vitals  Date Time BP Position Site L\R Cuff Size HR RR TEMP(F) WT  HT  BMI kg/m2 BSA m2 O2 Sat        02/13/2018 02:57 /82 Sitting    76 - R  98.6 284lbs 2oz    98 %           Physical Examination  · Constitutional  o Appearance  o : well developed, well-nourished, in no acute distress  · Eyes  o Conjunctivae  o : conjunctivae normal  · Neck  o Inspection/Palpation  o : supple  o Thyroid  o : no thyromegaly  · Respiratory  o Respiratory Effort  o : breathing unlabored  o Auscultation of Lungs  o : clear to ascultation  · Cardiovascular  o Heart  o :   § Auscultation of Heart  § : regular rate and rhythm  o Peripheral Vascular System  o :   § Extremities  § : no edema  · Lymphatic  o Neck  o : no lymphadenopathy present  · Musculoskeletal  o General  o :   § General Musculoskeletal  § : No joint swelling or deformity., Muscle tone, strength, and development grossly normal.  · Skin and Subcutaneous Tissue  o General Inspection  o : no lesions present, no areas of discoloration, skin turgor normal, texture normal  · Neurologic  o Gait and Station  o :   § Gait Screening  § : normal gait  · Psychiatric  o Mood and Affect  o : mood normal, affect appropriate          Assessment  · Gout     274.9/M10.9  · Hypertension, Benign Essential     401.1/I10  · Erectile disorder due to medical condition in male     607.84/N52.1  · Elevated glucose level     790.29/R73.09      Plan  · Orders  o CBC with Auto Diff [facility] ([id number]) - 607.84/N52.1, 790.29/R73.09, 401.1/I10, 274.9/M10.9 - 02/13/2018  o CMP [facility] ([id number]) - 607.84/N52.1, 790.29/R73.09, 401.1/I10,  274.9/M10.9 - 02/13/2018  o Hgb A1c [facility] ([id number]) - 607.84/N52.1, 790.29/R73.09, 401.1/I10, 274.9/M10.9 - 02/13/2018  o Lipid Panel [facility] ([id number]) - 607.84/N52.1, 790.29/R73.09, 401.1/I10, 274.9/M10.9 - 02/13/2018  o ACO-39: Current medications updated and reviewed () - - 02/13/2018  o Influenza immunization was not ordered or administered for reasons documented by clinician () - - 02/13/2018  · Medications  o Levitra 20 mg oral tablet   SIG: take 1 tablet (20 mg) by oral route once daily as needed approximately 1 hour before sexual activity for 90 days   DISP: (30) tablets with 3 refills  Prescribed on 02/13/2018     o allopurinol 100 mg oral tablet   SIG: take 1 tablet (100 mg) by oral route once daily for 90 days   DISP: (90) tablets with 1 refills  Prescribed on 02/13/2018     o amlodipine 5 mg oral tablet   SIG: take 1 tablet (5 mg) by oral route once daily for 90 days   DISP: (90) tablets with 1 refills  Prescribed on 02/13/2018     o lisinopril 20 mg oral tablet   SIG: take 1 tablet (20 mg) by oral route once daily for 90 days   DISP: (90) tablets with 1 refills  Prescribed on 02/13/2018     o azithromycin 250 mg oral tablet   SIG: take 2 tablets (500 mg) by oral route once daily for 1 day then 1 tablet (250 mg) by oral route once daily for 4 days   DISP: (6) tablets with 0 refills  Discontinued on 02/13/2018     o Medrol (Anthony) 4 mg oral tablets,dose pack   SIG: take as directed for 6 days   DISP: (1) 21 ct dose-pack with 0 refills  Discontinued on 02/13/2018     · Instructions  o Take all medications as prescribed/directed.  o Patient was educated/instructed on their diagnosis, treatment and medications prior to discharge from the clinic today.            Electronically Signed by: Alfred Church MD -Author on February 13, 2018 03:41:56 PM

## 2021-05-07 NOTE — PROGRESS NOTES
Progress Note      Patient Name: Arley Falk   Patient ID: [id number]   Sex: Male   YOB: 1950        Visit Date: March 27, 2018    Provider: Alfred Church MD   Location: [facility]   Location Address: [address] [place] [address]  [id number]   Location Phone: [phone]          Chief Complaint     US follow up       History Of Present Illness  Arley Falk is a 67 year old /White male who presents for evaluation and treatment of:      discussed U/S pt has gall stones and fatty liver- pt has not had any RUQ abd pain- pt exercising more and eating a better diet       Past Medical History  Disease Name Date Onset Notes   Enlarged prostate --  --    Gout --  --    Hypertension, Benign Essential --  --          Medication List  Name Date Started Instructions   allopurinol 100 mg oral tablet 02/13/2018 take 1 tablet (100 mg) by oral route once daily for 90 days   amlodipine 5 mg oral tablet 02/13/2018 take 1 tablet (5 mg) by oral route once daily for 90 days   Levitra 20 mg oral tablet 02/13/2018 take 1 tablet (20 mg) by oral route once daily as needed approximately 1 hour before sexual activity for 90 days   lisinopril 20 mg oral tablet 02/13/2018 take 1 tablet (20 mg) by oral route once daily for 90 days   Ventolin HFA 90 mcg/actuation inhalation HFA aerosol inhaler 02/07/2018 inhale 1 - 2 puffs (90 - 180 mcg) by inhalation route every 4-6 hours as needed for 10 days         Allergy List  Allergen Name Date Reaction Notes   NO KNOWN DRUG ALLERGIES --  --  --          Family Medical History  Disease Name Relative/Age Notes   Diabetes / --    Hyperlipidemia / Mother    Mother/    Hypertension / --          Social History  Finding Status Start/Stop Quantity Notes   Exercises regularly --  --/-- --  occasionally   Recreational Drug Use Never --/-- --  never used   Second hand smoke exposure Unknown --/-- --  no   Tobacco Former --/-- --  in the past  used other tobacco products         Review of Systems  · Constitutional  o Denies  o : fatigue, fever  · Cardiovascular  o Denies  o : chest pain, palpitations  · Respiratory  o Denies  o : shortness of breath, cough  · Gastrointestinal  o Denies  o : nausea, vomiting, diarrhea      Vitals  Date Time BP Position Site L\R Cuff Size HR RR TEMP(F) WT  HT  BMI kg/m2 BSA m2 O2 Sat HC       03/27/2018 02:04 /68 Sitting    97 - R  97.6 286lbs 6oz    97 %           Physical Examination  · Constitutional  o Appearance  o : well developed, well-nourished, in no acute distress  · Respiratory  o Respiratory Effort  o : breathing unlabored  o Auscultation of Lungs  o : clear to ascultation  · Cardiovascular  o Heart  o :   § Auscultation of Heart  § : regular rate and rhythm  o Peripheral Vascular System  o :   § Extremities  § : no edema  · Gastrointestinal  o Abdominal Examination  o :   § Abdomen  § : active bowel sounds, nontender, no masses palpated, no hepatosplenomegaly  · Musculoskeletal  o General  o :   § General Musculoskeletal  § : No joint swelling or deformity., Muscle tone, strength, and development grossly normal.  · Neurologic  o Gait and Station  o :   § Gait Screening  § : normal gait  · Psychiatric  o Mood and Affect  o : mood normal, affect appropriate          Assessment  · Fatty liver     571.8/K76.0  · Elevated liver enzymes     790.5/R74.8      Plan  · Orders  o ACO-39: Current medications updated and reviewed () - - 03/27/2018  o ACO-18: Negative screen for clinical depression using a standardized tool () - - 03/27/2018  o Influenza immunization was not ordered or administered for reasons documented by clinician () - - 03/27/2018  o ACO-19: Colorectal cancer screening results documented and reviewed (3017F) - - 03/27/2018  o Gastroenterology Consultation ([facility]) - 571.8/K76.0, 790.5/R74.8 - 03/27/2018   need eval and tx  · Instructions  o Patient was educated/instructed on their  diagnosis, treatment and medications prior to discharge from the clinic today.            Electronically Signed by: Alfred Church MD -Author on March 27, 2018 02:55:34 PM

## 2021-05-07 NOTE — PROGRESS NOTES
Progress Note      Patient Name: Arley Falk   Patient ID: [id number]   Sex: Male   YOB: 1950    Referring Provider: Alfred Church MD    Visit Date: March 3, 2020    Provider: Alfred Church MD   Location: [facility]   Location Address: [address] [place] [address]   [place]  [id number]   Location Phone: [phone]          Chief Complaint     refills and labs, patient is fasting       History Of Present Illness  Arley Falk is a 69 year old /White male who presents for evaluation and treatment of:      pt will get colonoscopy next month  need refills on meds  HTN- controlled on meds  pt has intermittent muscle cramps over last 6 months  GOUT- controlled on allopurinol  pt having a lot of allergies over last month  discussed depression questionnaire- pt does not feel depressed and refuses any treatment for depression- pt has intermittent fatigue only       Past Medical History  Disease Name Date Onset Notes   Enlarged prostate --  --    Gout --  --    Hypertension, Benign Essential --  --          Past Surgical History  Procedure Name Date Notes   Colonoscopy 01-19-17 --    Leg Surgery --  --          Medication List  Name Date Started Instructions   allopurinol 100 mg oral tablet 03/03/2020 take 1 tablet (100 mg) by oral route once daily for 90 days   amlodipine 5 mg oral tablet 03/03/2020 take 1 tablet (5 mg) by oral route once daily for 90 days   Fish Oil 1,000 mg (120 mg-180 mg) oral capsule 03/16/2020 take 2 capsules by oral route 2 times a day for 90 days   Flomax 0.4 mg oral capsule 03/03/2020 take 1 capsule (0.4 mg) by oral route once daily 1/2 hour following the same meal each day for 30 days   Levitra 20 mg oral tablet 08/19/2019 take 1 tablet (20 mg) by oral route once daily as needed approximately 1 hour before sexual activity for 90 days   lisinopril 20 mg oral tablet 03/03/2020 take 1 tablet (20 mg) by oral route once daily for 90 days  "  Mobic 7.5 mg oral tablet 03/03/2020 take 1 tablet (7.5 mg) by oral route once daily for 90 days   prednisone 20 mg oral tablet 03/16/2020 take 2 tablets by oral route daily for 5 days   vitamin E 600 unit oral capsule  take 1 capsule by oral route daily         Allergy List  Allergen Name Date Reaction Notes   NO KNOWN DRUG ALLERGIES --  --  --          Family Medical History  Disease Name Relative/Age Notes   Hyperlipidemia Mother/   Mother   Hypertension  --    Diabetes  --          Social History  Finding Status Start/Stop Quantity Notes   Exercises regularly --  --/-- --  occasionally   Recreational Drug Use Never --/-- --  never used   Second hand smoke exposure Unknown --/-- --  no   Tobacco Former --/30 --  quit smoking when he was 30, quit chewing at age 64, in the past used other tobacco products         Review of Systems  · Constitutional  o Denies  o : fatigue, fever  · Cardiovascular  o Denies  o : chest pain, palpitations  · Respiratory  o Denies  o : shortness of breath, cough  · Gastrointestinal  o Denies  o : nausea, vomiting, diarrhea  · Musculoskeletal  o Admits  o : muscle pain  · Psychiatric  o Denies  o : anxiety, depression      Vitals  Date Time BP Position Site L\R Cuff Size HR RR TEMP (F) WT  HT  BMI kg/m2 BSA m2 O2 Sat HC       03/03/2020 09:07 /80 Sitting    86 - R  97.9 292lbs 16oz 5'  10\" 42.04 2.56 96 %          Physical Examination  · Constitutional  o Appearance  o : well developed, well-nourished, in no acute distress  · Head and Face  o HEENT  o : Unremarkable  · Respiratory  o Respiratory Effort  o : breathing unlabored  o Auscultation of Lungs  o : clear to ascultation  · Cardiovascular  o Heart  o :   § Auscultation of Heart  § : regular rate and rhythm  o Peripheral Vascular System  o :   § Extremities  § : no edema  · Gastrointestinal  o Abdomen  o : soft, non-tender, non-distended, + bowel sounds, no hepatosplenomegaly, no masses " palpated  · Musculoskeletal  o General  o :   § General Musculoskeletal  § : No joint swelling or deformity. Muscle tone, strength, and development grossly normal.  · Neurologic  o Gait and Station  o :   § Gait Screening  § : normal gait  · Psychiatric  o Mood and Affect  o : mood normal, affect appropriate          Assessment  · Essential hypertension     401.9/I10  · Gout     274.9/M10.9  · Muscle cramps     729.82/R25.2  · Allergic rhinitis     477.9/J30.9  · Vaccine counseling     V65.49/Z71.89    Problems Reconciled  Plan  · Orders  o CBC with Auto Diff [facility] ([id number]) - 401.9/I10 - 03/03/2020  o CMP [facility] ([id number]) - 401.9/I10 - 03/03/2020  o Lipid Panel [facility] (78065) - 401.9/I10 - 03/03/2020  o Thyroid Profile (THYII) - 401.9/I10 - 03/03/2020  o Immunization Admin Fee (Single) ([facility]) (31644) - - 03/18/2020  o ACO-13: Fall Risk Screening with no falls in past year or only one fall without injury in the past year (1101F) - - 03/03/2020  o ACO-14: Influenza immunization administered or previously received () - - 03/03/2020  o ACO-15: Pneumococcal Vaccine Administered or Previously Received (4040F) - - 03/03/2020  o ACO-18: Negative screen for clinical depression using a standardized tool () - - 03/03/2020  o ACO-19: Colorectal cancer screening results documented and reviewed (3017F) - - 03/03/2020   COLONOSCOPY ON 01-19-17  o ACO-39: Current medications updated and reviewed () - - 03/03/2020  o Pneumovax 23 (82226) - V65.49/Z71.89 - 03/03/2020   Vaccine - [id number]; Dose: 0.5; Site: Left Upper Arm; Route: Intramuscular; Date: 03/18/2020 16:21:00; Exp: 05/18/2021; Lot: [id number]; Mfg: [employer] TradeName: [id number] 23; Administered By: Tawnya Breen MA; Comment: NDC#[id number]  o Magnesium level (43548) - 729.82/R25.2 - 03/03/2020  o CPK Total [facility] (46259) - 729.82/R25.2 - 03/03/2020  o Uric Acid Serum [facility] (01618) - 274.9/M10.9 - 03/03/2020  · Medications  o Singulair 10 mg oral tablet   SIG: take  1 tablet (10 mg) by oral route once daily in the evening for 90 days   DISP: (90) tablets with 1 refills  Prescribed on 03/03/2020     o allopurinol 100 mg oral tablet   SIG: take 1 tablet (100 mg) by oral route once daily for 90 days   DISP: (90) tablets with 1 refills  Adjusted on 03/03/2020     o amlodipine 5 mg oral tablet   SIG: take 1 tablet (5 mg) by oral route once daily for 90 days   DISP: (90) tablets with 1 refills  Adjusted on 03/03/2020     o Flomax 0.4 mg oral capsule   SIG: take 1 capsule (0.4 mg) by oral route once daily 1/2 hour following the same meal each day for 30 days   DISP: (90) Capsule with 1 refills  Adjusted on 03/03/2020     o lisinopril 20 mg oral tablet   SIG: take 1 tablet (20 mg) by oral route once daily for 90 days   DISP: (90) tablets with 1 refills  Adjusted on 03/03/2020     o Mobic 7.5 mg oral tablet   SIG: take 1 tablet (7.5 mg) by oral route once daily for 90 days   DISP: (90) tablets with 1 refills  Adjusted on 03/03/2020     o Medications have been Reconciled  o Transition of Care or Provider Policy  · Instructions  o Handouts were given to patient: vaccines.  o Patient was educated/instructed on their diagnosis, treatment and medications prior to discharge from the clinic today.  o Electronically Identified Patient Education Materials Provided Electronically            Electronically Signed by: Alfred Church MD -Author on March 23, 2020 03:56:09 PM

## 2021-05-07 NOTE — PROGRESS NOTES
Progress Note      Patient Name: Arley Falk   Patient ID: [id number]   Sex: Male   YOB: 1950    Referring Provider: Alfred Church MD    Visit Date: April 1, 2021    Provider: Alfred Church MD   Location: [facility] [place]   Location Address: [address] [place] [address]   [place]  [id number]   Location Phone: [phone]          Chief Complaint     med refills  fasting labs       History Of Present Illness  Arley Falk is a 70 year old /White male who presents for evaluation and treatment of:      need refills and labs  HTN- uncontrolled- pt has been out of meds x 2 days- pt will restart when he gets meds and he will continue to monitor his BP and return if need tx  Gout- no recent attacks- appears to be controlled  pt tolerating meds well  hyperlipidemia- unknown control- need labs to assess  discussed fda black box warning for singulair- pt still wants to take risk and wants to continue singulair       Past Medical History  Disease Name Date Onset Notes   Enlarged prostate --  --    Gout --  --    Hypertension, Benign Essential --  --          Past Surgical History  Procedure Name Date Notes   Colonoscopy 01-19-17 --    Leg Surgery --  --          Medication List  Name Date Started Instructions   allopurinol 100 mg oral tablet 12/23/2020 take 1 tablet (100 mg) by oral route once daily for 90 days   amlodipine 5 mg oral tablet 12/23/2020 take 1 tablet (5 mg) by oral route once daily for 90 days   Fish Oil 1,000 mg (120 mg-180 mg) oral capsule 03/16/2020 take 2 capsules by oral route 2 times a day for 90 days   Levitra 20 mg oral tablet 08/19/2019 take 1 tablet (20 mg) by oral route once daily as needed approximately 1 hour before sexual activity for 90 days   lisinopril 20 mg oral tablet 12/23/2020 take 1 tablet (20 mg) by oral route once daily for 90 days   Mobic 7.5 mg oral tablet 12/23/2020 take 1 tablet (7.5 mg) by oral route once daily for 90 days    prednisone 20 mg oral tablet 03/16/2020 take 2 tablets by oral route daily for 5 days   Singulair 10 mg oral tablet 08/24/2020 take 1 tablet (10 mg) by oral route once daily in the evening for 90 days   tamsulosin 0.4 mg oral capsule 12/23/2020 TAKE 1 CAPSULE (0.4MG) BY MOUTH ONE DAILY 1/2 HOUR FOLLOWING THE SAME MEAL EACH DAY   vitamin E 600 unit oral capsule  take 1 capsule by oral route daily         Allergy List  Allergen Name Date Reaction Notes   NO KNOWN DRUG ALLERGIES --  --  --          Family Medical History  Disease Name Relative/Age Notes   Hyperlipidemia Mother/   Mother   Hypertension  --    Diabetes  --          Social History  Finding Status Start/Stop Quantity Notes   Exercises regularly --  --/-- --  occasionally   Recreational Drug Use Never --/-- --  never used   Second hand smoke exposure Unknown --/-- --  no   Tobacco Former --/30 --  quit smoking when he was 30, quit chewing at age 64, in the past used other tobacco products         Immunizations  NameDate Admin Mfg Trade Name Lot Number Route Inj VIS Given VIS Publication   COVID Ldcungm9403/21/2021 MOD Moderna COVID-19 Vaccine  NE NE 04/01/2021    Comments: [facility] pharm   COVID Kjjidui6302/25/2021 MOD Moderna COVID-19 Vaccine  NE NE 04/01/2021    Comments: [facility] pharm   Triyjzecf61/15/2019 UNK Unknown TradeName [id number] [facility] 03/16/2020    Comments: [facility]   Ifpxhundx6688/18/2020 MSD PNEUMOVAX 23 [id number]   03/18/2020    Comments: NDC#[id number]         Review of Systems  · Constitutional  o Denies  o : fatigue, fever  · Cardiovascular  o Denies  o : chest pain, palpitations  · Respiratory  o Denies  o : shortness of breath, cough  · Gastrointestinal  o Denies  o : nausea, vomiting, diarrhea  · Psychiatric  o Denies  o : anxiety, depression      Vitals  Date Time BP Position Site L\R Cuff Size HR RR TEMP (F) WT  HT  BMI kg/m2 BSA m2 O2 Sat FR L/min FiO2        04/01/2021 08:15 /80 Sitting    79 - R  97.5  "293lbs 16oz 5'  9\" 43.42 2.55 96 %            Physical Examination  · Constitutional  o Appearance  o : well developed, well-nourished, in no acute distress  · Head and Face  o HEENT  o : Unremarkable, MMM  · Neck  o Inspection/Palpation  o : supple  o Thyroid  o : no thyromegaly  · Respiratory  o Respiratory Effort  o : breathing unlabored  o Auscultation of Lungs  o : clear to ascultation  · Cardiovascular  o Heart  o :   § Auscultation of Heart  § : regular rate and rhythm  o Peripheral Vascular System  o :   § Extremities  § : no edema  · Gastrointestinal  o Abdomen  o : soft, non-tender, non-distended, + bowel sounds, no hepatosplenomegaly, no masses palpated  · Lymphatic  o Neck  o : no lymphadenopathy present in neck  · Musculoskeletal  o General  o :   § General Musculoskeletal  § : No joint swelling or deformity., Muscle tone, strength, and development grossly normal.  · Neurologic  o Gait and Station  o :   § Gait Screening  § : normal gait  · Psychiatric  o Mood and Affect  o : mood normal, affect appropriate          Assessment  · Essential hypertension     401.9/I10  · Hyperlipidemia     272.4/E78.5  · Gout     274.9/M10.9      Plan  · Orders  o CBC with Auto Diff [facility] ([id number]) - 274.9/M10.9, 401.9/I10, 272.4/E78.5 - 04/01/2021  o CMP [facility] ([id number]) - 274.9/M10.9, 401.9/I10, 272.4/E78.5 - 04/01/2021  o Lipid Panel [facility] ([id number]) - 274.9/M10.9, 401.9/I10, 272.4/E78.5 - 04/01/2021  o ACO-19: Colorectal cancer screening results documented and reviewed (3017F) - - 04/01/2021  o ACO-39: Current medications updated and reviewed (1159F, ) - - 04/01/2021  o Uric Acid Serum [facility] ([id number]) - 274.9/M10.9, 401.9/I10, 272.4/E78.5 - 04/01/2021  · Medications  o allopurinol 100 mg oral tablet   SIG: take 1 tablet (100 mg) by oral route once daily for 90 days   DISP: (90) Tablet with 1 refills  Adjusted on 04/01/2021     o amlodipine 5 mg oral tablet   SIG: take 1 tablet (5 mg) by oral route once daily for 90 days   DISP: " (90) Tablet with 1 refills  Adjusted on 04/01/2021     o Fish Oil 1,000 mg (120 mg-180 mg) oral capsule   SIG: take 2 capsules by oral route 2 times a day for 90 days   DISP: (360) Capsule with 1 refills  Adjusted on 04/01/2021     o Levitra 20 mg oral tablet   SIG: take 1 tablet (20 mg) by oral route once daily as needed approximately 1 hour before sexual activity for 90 days   DISP: (30) Tablet with 3 refills  Adjusted on 04/01/2021     o lisinopril 20 mg oral tablet   SIG: take 1 tablet (20 mg) by oral route once daily for 90 days   DISP: (90) Tablet with 1 refills  Adjusted on 04/01/2021     o Mobic 7.5 mg oral tablet   SIG: take 1 tablet (7.5 mg) by oral route once daily for 90 days   DISP: (90) Tablet with 1 refills  Adjusted on 04/01/2021     o Singulair 10 mg oral tablet   SIG: take 1 tablet (10 mg) by oral route once daily in the evening for 90 days   DISP: (90) Tablet with 1 refills  Adjusted on 04/01/2021     o tamsulosin 0.4 mg oral capsule   SIG: TAKE 1 CAPSULE (0.4MG) BY MOUTH ONE DAILY 1/2 HOUR FOLLOWING THE SAME MEAL EACH DAY   DISP: (90) Capsule with 1 refills  Adjusted on 04/01/2021     o prednisone 20 mg oral tablet   SIG: take 2 tablets by oral route daily for 5 days   DISP: (10) tablets with 0 refills  Discontinued on 04/01/2021     o Medications have been Reconciled  o Transition of Care or Provider Policy  · Instructions  o Advised that cheeses and other sources of dairy fats, animal fats, fast food, and the extras (candy, pasteries, pies, doughnuts and cookies) all contain LDL raising nutrients. Advised to increase fruits, vegetables, whole grains, and to monitor portion sizes.   o Patient was educated/instructed on their diagnosis, treatment and medications prior to discharge from the clinic today.            Electronically Signed by: Alfred Church MD -Author on April 1, 2021 08:27:56 AM

## 2021-05-09 VITALS
WEIGHT: 287.12 LBS | TEMPERATURE: 97.8 F | DIASTOLIC BLOOD PRESSURE: 80 MMHG | OXYGEN SATURATION: 90 % | HEART RATE: 77 BPM | SYSTOLIC BLOOD PRESSURE: 130 MMHG

## 2021-05-09 VITALS
TEMPERATURE: 99.1 F | HEART RATE: 86 BPM | BODY MASS INDEX: 42.21 KG/M2 | HEIGHT: 69 IN | DIASTOLIC BLOOD PRESSURE: 80 MMHG | SYSTOLIC BLOOD PRESSURE: 122 MMHG | OXYGEN SATURATION: 96 % | WEIGHT: 285 LBS

## 2021-05-09 VITALS
DIASTOLIC BLOOD PRESSURE: 82 MMHG | TEMPERATURE: 98.6 F | WEIGHT: 284.12 LBS | SYSTOLIC BLOOD PRESSURE: 126 MMHG | HEART RATE: 76 BPM | OXYGEN SATURATION: 98 %

## 2021-05-09 VITALS
HEART RATE: 84 BPM | OXYGEN SATURATION: 96 % | DIASTOLIC BLOOD PRESSURE: 80 MMHG | WEIGHT: 285 LBS | TEMPERATURE: 97.6 F | SYSTOLIC BLOOD PRESSURE: 130 MMHG

## 2021-05-09 VITALS
DIASTOLIC BLOOD PRESSURE: 80 MMHG | HEIGHT: 69 IN | HEART RATE: 79 BPM | WEIGHT: 294 LBS | BODY MASS INDEX: 43.55 KG/M2 | SYSTOLIC BLOOD PRESSURE: 142 MMHG | TEMPERATURE: 97.5 F | OXYGEN SATURATION: 96 %

## 2021-05-09 VITALS
DIASTOLIC BLOOD PRESSURE: 76 MMHG | WEIGHT: 292 LBS | BODY MASS INDEX: 41.8 KG/M2 | TEMPERATURE: 97.4 F | HEART RATE: 97 BPM | SYSTOLIC BLOOD PRESSURE: 142 MMHG | HEIGHT: 70 IN | OXYGEN SATURATION: 98 %

## 2021-05-09 VITALS
HEART RATE: 97 BPM | OXYGEN SATURATION: 97 % | WEIGHT: 286.37 LBS | TEMPERATURE: 97.6 F | DIASTOLIC BLOOD PRESSURE: 68 MMHG | SYSTOLIC BLOOD PRESSURE: 118 MMHG

## 2021-05-09 VITALS
HEART RATE: 75 BPM | WEIGHT: 283 LBS | OXYGEN SATURATION: 94 % | SYSTOLIC BLOOD PRESSURE: 136 MMHG | TEMPERATURE: 97.4 F | DIASTOLIC BLOOD PRESSURE: 80 MMHG

## 2021-05-09 VITALS
SYSTOLIC BLOOD PRESSURE: 124 MMHG | HEART RATE: 83 BPM | TEMPERATURE: 97.6 F | BODY MASS INDEX: 41.27 KG/M2 | WEIGHT: 288.25 LBS | HEIGHT: 70 IN | OXYGEN SATURATION: 100 % | DIASTOLIC BLOOD PRESSURE: 78 MMHG

## 2021-05-09 VITALS
TEMPERATURE: 97.9 F | OXYGEN SATURATION: 96 % | SYSTOLIC BLOOD PRESSURE: 134 MMHG | BODY MASS INDEX: 41.95 KG/M2 | WEIGHT: 293 LBS | HEIGHT: 70 IN | HEART RATE: 86 BPM | DIASTOLIC BLOOD PRESSURE: 80 MMHG

## 2021-05-09 VITALS
HEIGHT: 70 IN | OXYGEN SATURATION: 98 % | TEMPERATURE: 97.4 F | SYSTOLIC BLOOD PRESSURE: 150 MMHG | DIASTOLIC BLOOD PRESSURE: 90 MMHG | HEART RATE: 82 BPM | BODY MASS INDEX: 41.23 KG/M2 | WEIGHT: 288 LBS

## 2021-05-09 VITALS
OXYGEN SATURATION: 98 % | SYSTOLIC BLOOD PRESSURE: 142 MMHG | HEIGHT: 70 IN | BODY MASS INDEX: 40.57 KG/M2 | DIASTOLIC BLOOD PRESSURE: 84 MMHG | WEIGHT: 283.37 LBS | TEMPERATURE: 97 F | HEART RATE: 77 BPM

## 2021-05-16 VITALS — BODY MASS INDEX: 37.25 KG/M2 | HEART RATE: 68 BPM | HEIGHT: 72 IN | OXYGEN SATURATION: 97 % | WEIGHT: 275 LBS

## 2021-05-16 VITALS
OXYGEN SATURATION: 98 % | BODY MASS INDEX: 38.48 KG/M2 | SYSTOLIC BLOOD PRESSURE: 156 MMHG | WEIGHT: 284.06 LBS | DIASTOLIC BLOOD PRESSURE: 76 MMHG | RESPIRATION RATE: 12 BRPM | HEART RATE: 74 BPM | HEIGHT: 72 IN

## 2021-05-16 VITALS — HEIGHT: 72 IN | WEIGHT: 275 LBS | BODY MASS INDEX: 37.25 KG/M2

## 2021-09-27 RX ORDER — MELOXICAM 7.5 MG/1
TABLET ORAL
Qty: 90 TABLET | Refills: 1 | Status: SHIPPED | OUTPATIENT
Start: 2021-09-27 | End: 2021-10-18

## 2021-09-27 RX ORDER — ALLOPURINOL 100 MG/1
TABLET ORAL
Qty: 90 TABLET | Refills: 1 | Status: SHIPPED | OUTPATIENT
Start: 2021-09-27 | End: 2021-10-18

## 2021-09-27 RX ORDER — AMLODIPINE BESYLATE 5 MG/1
TABLET ORAL
Qty: 90 TABLET | Refills: 1 | Status: SHIPPED | OUTPATIENT
Start: 2021-09-27 | End: 2021-10-18

## 2021-10-18 ENCOUNTER — OFFICE VISIT (OUTPATIENT)
Dept: FAMILY MEDICINE CLINIC | Facility: CLINIC | Age: 71
End: 2021-10-18

## 2021-10-18 VITALS
BODY MASS INDEX: 41.97 KG/M2 | TEMPERATURE: 98 F | SYSTOLIC BLOOD PRESSURE: 151 MMHG | OXYGEN SATURATION: 99 % | WEIGHT: 293.2 LBS | HEART RATE: 75 BPM | DIASTOLIC BLOOD PRESSURE: 85 MMHG | HEIGHT: 70 IN

## 2021-10-18 DIAGNOSIS — I10 PRIMARY HYPERTENSION: ICD-10-CM

## 2021-10-18 DIAGNOSIS — M1A.9XX0 CHRONIC GOUT WITHOUT TOPHUS, UNSPECIFIED CAUSE, UNSPECIFIED SITE: Primary | ICD-10-CM

## 2021-10-18 DIAGNOSIS — E78.2 MIXED HYPERLIPIDEMIA: ICD-10-CM

## 2021-10-18 DIAGNOSIS — R73.09 ELEVATED RANDOM BLOOD GLUCOSE LEVEL: ICD-10-CM

## 2021-10-18 DIAGNOSIS — M19.90 ARTHRITIS: ICD-10-CM

## 2021-10-18 DIAGNOSIS — N40.0 BENIGN PROSTATIC HYPERPLASIA WITHOUT LOWER URINARY TRACT SYMPTOMS: ICD-10-CM

## 2021-10-18 DIAGNOSIS — J30.89 NON-SEASONAL ALLERGIC RHINITIS, UNSPECIFIED TRIGGER: ICD-10-CM

## 2021-10-18 DIAGNOSIS — Z12.5 SCREENING PSA (PROSTATE SPECIFIC ANTIGEN): ICD-10-CM

## 2021-10-18 LAB
ALBUMIN SERPL-MCNC: 4.9 G/DL (ref 3.5–5.2)
ALBUMIN/GLOB SERPL: 2.7 G/DL
ALP SERPL-CCNC: 62 U/L (ref 39–117)
ALT SERPL W P-5'-P-CCNC: 28 U/L (ref 1–41)
ANION GAP SERPL CALCULATED.3IONS-SCNC: 10.9 MMOL/L (ref 5–15)
AST SERPL-CCNC: 27 U/L (ref 1–40)
BASOPHILS # BLD AUTO: 0.03 10*3/MM3 (ref 0–0.2)
BASOPHILS NFR BLD AUTO: 0.6 % (ref 0–1.5)
BILIRUB SERPL-MCNC: 0.7 MG/DL (ref 0–1.2)
BUN SERPL-MCNC: 12 MG/DL (ref 8–23)
BUN/CREAT SERPL: 13.8 (ref 7–25)
CALCIUM SPEC-SCNC: 9.1 MG/DL (ref 8.6–10.5)
CHLORIDE SERPL-SCNC: 103 MMOL/L (ref 98–107)
CHOLEST SERPL-MCNC: 156 MG/DL (ref 0–200)
CO2 SERPL-SCNC: 24.1 MMOL/L (ref 22–29)
CREAT SERPL-MCNC: 0.87 MG/DL (ref 0.76–1.27)
DEPRECATED RDW RBC AUTO: 41 FL (ref 37–54)
EOSINOPHIL # BLD AUTO: 0.25 10*3/MM3 (ref 0–0.4)
EOSINOPHIL NFR BLD AUTO: 4.8 % (ref 0.3–6.2)
ERYTHROCYTE [DISTWIDTH] IN BLOOD BY AUTOMATED COUNT: 12.4 % (ref 12.3–15.4)
GFR SERPL CREATININE-BSD FRML MDRD: 86 ML/MIN/1.73
GLOBULIN UR ELPH-MCNC: 1.8 GM/DL
GLUCOSE SERPL-MCNC: 124 MG/DL (ref 65–99)
HBA1C MFR BLD: 5.7 % (ref 4.8–5.6)
HCT VFR BLD AUTO: 41.9 % (ref 37.5–51)
HDLC SERPL-MCNC: 36 MG/DL (ref 40–60)
HGB BLD-MCNC: 14.8 G/DL (ref 13–17.7)
IMM GRANULOCYTES # BLD AUTO: 0.03 10*3/MM3 (ref 0–0.05)
IMM GRANULOCYTES NFR BLD AUTO: 0.6 % (ref 0–0.5)
LDLC SERPL CALC-MCNC: 101 MG/DL (ref 0–100)
LDLC/HDLC SERPL: 2.75 {RATIO}
LYMPHOCYTES # BLD AUTO: 1.1 10*3/MM3 (ref 0.7–3.1)
LYMPHOCYTES NFR BLD AUTO: 21.3 % (ref 19.6–45.3)
MCH RBC QN AUTO: 32.2 PG (ref 26.6–33)
MCHC RBC AUTO-ENTMCNC: 35.3 G/DL (ref 31.5–35.7)
MCV RBC AUTO: 91.1 FL (ref 79–97)
MONOCYTES # BLD AUTO: 0.49 10*3/MM3 (ref 0.1–0.9)
MONOCYTES NFR BLD AUTO: 9.5 % (ref 5–12)
NEUTROPHILS NFR BLD AUTO: 3.27 10*3/MM3 (ref 1.7–7)
NEUTROPHILS NFR BLD AUTO: 63.2 % (ref 42.7–76)
NRBC BLD AUTO-RTO: 0 /100 WBC (ref 0–0.2)
PLATELET # BLD AUTO: 149 10*3/MM3 (ref 140–450)
PMV BLD AUTO: 10.6 FL (ref 6–12)
POTASSIUM SERPL-SCNC: 4.2 MMOL/L (ref 3.5–5.2)
PROT SERPL-MCNC: 6.7 G/DL (ref 6–8.5)
PSA SERPL-MCNC: 4.27 NG/ML (ref 0–4)
RBC # BLD AUTO: 4.6 10*6/MM3 (ref 4.14–5.8)
SODIUM SERPL-SCNC: 138 MMOL/L (ref 136–145)
T4 FREE SERPL-MCNC: 1.34 NG/DL (ref 0.93–1.7)
TRIGL SERPL-MCNC: 105 MG/DL (ref 0–150)
TSH SERPL DL<=0.05 MIU/L-ACNC: 2.3 UIU/ML (ref 0.27–4.2)
VLDLC SERPL-MCNC: 19 MG/DL (ref 5–40)
WBC # BLD AUTO: 5.17 10*3/MM3 (ref 3.4–10.8)

## 2021-10-18 PROCEDURE — 84443 ASSAY THYROID STIM HORMONE: CPT | Performed by: FAMILY MEDICINE

## 2021-10-18 PROCEDURE — 36415 COLL VENOUS BLD VENIPUNCTURE: CPT | Performed by: FAMILY MEDICINE

## 2021-10-18 PROCEDURE — 99214 OFFICE O/P EST MOD 30 MIN: CPT | Performed by: FAMILY MEDICINE

## 2021-10-18 PROCEDURE — G0103 PSA SCREENING: HCPCS | Performed by: FAMILY MEDICINE

## 2021-10-18 PROCEDURE — 80053 COMPREHEN METABOLIC PANEL: CPT | Performed by: FAMILY MEDICINE

## 2021-10-18 PROCEDURE — 83036 HEMOGLOBIN GLYCOSYLATED A1C: CPT | Performed by: FAMILY MEDICINE

## 2021-10-18 PROCEDURE — 85025 COMPLETE CBC W/AUTO DIFF WBC: CPT | Performed by: FAMILY MEDICINE

## 2021-10-18 PROCEDURE — 84439 ASSAY OF FREE THYROXINE: CPT | Performed by: FAMILY MEDICINE

## 2021-10-18 PROCEDURE — 80061 LIPID PANEL: CPT | Performed by: FAMILY MEDICINE

## 2021-10-18 RX ORDER — IBUPROFEN 200 MG
TABLET ORAL
COMMUNITY
End: 2021-10-18

## 2021-10-18 RX ORDER — CHLORAL HYDRATE 500 MG
2000 CAPSULE ORAL 2 TIMES DAILY WITH MEALS
Qty: 360 CAPSULE | Refills: 1 | Status: SHIPPED | OUTPATIENT
Start: 2021-10-18

## 2021-10-18 RX ORDER — MONTELUKAST SODIUM 10 MG/1
10 TABLET ORAL DAILY
Qty: 90 TABLET | Refills: 1 | Status: SHIPPED | OUTPATIENT
Start: 2021-10-18 | End: 2022-03-08 | Stop reason: SDUPTHER

## 2021-10-18 RX ORDER — ALLOPURINOL 100 MG/1
100 TABLET ORAL DAILY
COMMUNITY
Start: 2021-09-27 | End: 2021-10-18 | Stop reason: SDUPTHER

## 2021-10-18 RX ORDER — MELOXICAM 7.5 MG/1
7.5 TABLET ORAL DAILY
COMMUNITY
Start: 2021-09-27 | End: 2021-10-18 | Stop reason: SDUPTHER

## 2021-10-18 RX ORDER — MELOXICAM 7.5 MG/1
7.5 TABLET ORAL DAILY
Qty: 90 TABLET | Refills: 1 | Status: SHIPPED | OUTPATIENT
Start: 2021-10-18 | End: 2022-02-22

## 2021-10-18 RX ORDER — ALLOPURINOL 100 MG/1
100 TABLET ORAL DAILY
Qty: 90 TABLET | Refills: 1 | Status: SHIPPED | OUTPATIENT
Start: 2021-10-18 | End: 2022-04-11

## 2021-10-18 RX ORDER — TAMSULOSIN HYDROCHLORIDE 0.4 MG/1
0.4 CAPSULE ORAL DAILY
Qty: 90 CAPSULE | Refills: 1 | Status: SHIPPED | OUTPATIENT
Start: 2021-10-18 | End: 2022-05-02

## 2021-10-18 RX ORDER — LISINOPRIL 20 MG/1
20 TABLET ORAL DAILY
Qty: 90 TABLET | Refills: 1 | Status: SHIPPED | OUTPATIENT
Start: 2021-10-18 | End: 2022-02-22

## 2021-10-18 RX ORDER — CHLORAL HYDRATE 500 MG
CAPSULE ORAL
COMMUNITY
End: 2021-10-18 | Stop reason: SDUPTHER

## 2021-10-18 RX ORDER — AMLODIPINE BESYLATE 5 MG/1
5 TABLET ORAL DAILY
COMMUNITY
Start: 2021-09-27 | End: 2021-10-18 | Stop reason: SDUPTHER

## 2021-10-18 RX ORDER — TAMSULOSIN HYDROCHLORIDE 0.4 MG/1
0.4 CAPSULE ORAL DAILY
COMMUNITY
Start: 2021-08-09 | End: 2021-10-18 | Stop reason: SDUPTHER

## 2021-10-18 RX ORDER — LISINOPRIL 20 MG/1
20 TABLET ORAL DAILY
COMMUNITY
End: 2021-10-18 | Stop reason: SDUPTHER

## 2021-10-18 RX ORDER — MONTELUKAST SODIUM 10 MG/1
10 TABLET ORAL DAILY
COMMUNITY
Start: 2021-09-09 | End: 2021-10-18 | Stop reason: SDUPTHER

## 2021-10-18 RX ORDER — AMLODIPINE BESYLATE 5 MG/1
5 TABLET ORAL DAILY
Qty: 90 TABLET | Refills: 1 | Status: SHIPPED | OUTPATIENT
Start: 2021-10-18 | End: 2022-04-11

## 2021-10-18 NOTE — PROGRESS NOTES
Chief Complaint  Hypertension ([place] )    Subjective          Arley Falk presents to [facility]  Pt is out of BP meds- needs to restart today and continue to monitor  130's/60's normally    Hypertension  This is a chronic problem. The current episode started more than 1 year ago. The problem has been gradually improving since onset. The problem is uncontrolled. Pertinent negatives include no anxiety, blurred vision, chest pain, headaches, malaise/fatigue, neck pain, orthopnea, palpitations, peripheral edema, PND, shortness of breath or sweats. There are no associated agents to hypertension. Risk factors for coronary artery disease include dyslipidemia and male gender. Current antihypertension treatment includes calcium channel blockers and ACE inhibitors. The current treatment provides significant improvement. There are no compliance problems.    Hyperlipidemia  This is a chronic problem. The current episode started more than 1 year ago. The problem is uncontrolled. Recent lipid tests were reviewed and are variable. There are no known factors aggravating his hyperlipidemia. Pertinent negatives include no chest pain, focal sensory loss, focal weakness, leg pain, myalgias or shortness of breath. Current antihyperlipidemic treatment includes diet change. The current treatment provides mild improvement of lipids. There are no compliance problems.    Arthritis  Presents for follow-up visit. He reports no pain, stiffness, joint swelling or joint warmth. The symptoms have been improving. His pain is at a severity of 3/10. Pertinent negatives include no diarrhea, dry eyes, dry mouth, dysuria, fatigue, fever, pain at night, pain while resting, rash, Raynaud's syndrome, uveitis or weight loss. Compliance with total regimen is %. Compliance with medications is %. Treatment side effects: no side effects.       Objective   No Known Allergies    There is no immunization  "history on file for this patient.    Vital Signs:   Vitals:    10/18/21 0816   BP: 151/85   Pulse: 75   Temp: 98 °F (36.7 °C)   SpO2: 99%   Weight: 133 kg (293 lb 3.2 oz)   Height: 176.5 cm (69.5\")       Physical Exam  Vitals reviewed.   Constitutional:       Appearance: Normal appearance. He is well-developed.   HENT:      Head: Normocephalic and atraumatic.      Right Ear: External ear normal.      Left Ear: External ear normal.      Mouth/Throat:      Mouth: Mucous membranes are moist.      Pharynx: Oropharynx is clear. No oropharyngeal exudate or posterior oropharyngeal erythema.   Eyes:      Conjunctiva/sclera: Conjunctivae normal.      Pupils: Pupils are equal, round, and reactive to light.   Cardiovascular:      Rate and Rhythm: Normal rate and regular rhythm.      Pulses: Normal pulses.      Heart sounds: Normal heart sounds. No murmur heard.  No friction rub. No gallop.    Pulmonary:      Effort: Pulmonary effort is normal.      Breath sounds: Normal breath sounds. No wheezing or rhonchi.   Abdominal:      General: Bowel sounds are normal. There is no distension.      Palpations: Abdomen is soft. There is no mass.      Tenderness: There is no abdominal tenderness. There is no guarding or rebound.      Hernia: No hernia is present.   Musculoskeletal:         General: Normal range of motion.      Cervical back: Normal range of motion and neck supple.   Skin:     General: Skin is warm and dry.      Capillary Refill: Capillary refill takes less than 2 seconds.   Neurological:      Mental Status: He is alert and oriented to person, place, and time.      Cranial Nerves: No cranial nerve deficit.   Psychiatric:         Mood and Affect: Mood and affect normal.         Behavior: Behavior normal.         Thought Content: Thought content normal.         Judgment: Judgment normal.        Result Review :   The following data was reviewed by: Alfred Church MD on 10/18/2021:  CMP    CMP 4/1/21   Glucose 116 (A)   BUN 11 "   Creatinine 1.01   Sodium 139   Potassium 4.3   Chloride 103   Calcium 9.1   Albumin 4.5   Total Bilirubin 0.62   Alkaline Phosphatase 66   AST (SGOT) 18   ALT (SGPT) 22   (A) Abnormal value            CBC    CBC 4/1/21   WBC 6.80   RBC 4.76   Hemoglobin 14.7   Hematocrit 44.9   MCV 94.3   MCH 30.9   MCHC 32.7 (A)   RDW 12.6   Platelets 152   (A) Abnormal value            Lipid Panel    Lipid Panel 4/1/21   Total Cholesterol 162   Triglycerides 185 (A)   HDL Cholesterol 37 (A)   VLDL Cholesterol 37   LDL Cholesterol  88   (A) Abnormal value       Comments are available for some flowsheets but are not being displayed.                             Assessment and Plan    Diagnoses and all orders for this visit:    1. Chronic gout without tophus, unspecified cause, unspecified site (Primary)  -     allopurinol (ZYLOPRIM) 100 MG tablet; Take 1 tablet by mouth Daily.  Dispense: 90 tablet; Refill: 1    2. Elevated random blood glucose level  -     Hemoglobin A1c    3. Screening PSA (prostate specific antigen)  -     PSA Screen    4. Mixed hyperlipidemia  -     Omega-3 Fatty Acids (fish oil) 1000 MG capsule capsule; Take 2 capsules by mouth 2 (Two) Times a Day With Meals.  Dispense: 360 capsule; Refill: 1  -     Lipid Panel    5. Primary hypertension  -     amLODIPine (NORVASC) 5 MG tablet; Take 1 tablet by mouth Daily.  Dispense: 90 tablet; Refill: 1  -     lisinopril (PRINIVIL,ZESTRIL) 20 MG tablet; Take 1 tablet by mouth Daily.  Dispense: 90 tablet; Refill: 1  -     CBC Auto Differential  -     Comprehensive Metabolic Panel  -     TSH+Free T4    6. Arthritis  -     meloxicam (MOBIC) 7.5 MG tablet; Take 1 tablet by mouth Daily.  Dispense: 90 tablet; Refill: 1    7. Non-seasonal allergic rhinitis, unspecified trigger  -     montelukast (SINGULAIR) 10 MG tablet; Take 1 tablet by mouth Daily.  Dispense: 90 tablet; Refill: 1    8. Benign prostatic hyperplasia without lower urinary tract symptoms  -     tamsulosin (FLOMAX) 0.4  MG capsule 24 hr capsule; Take 1 capsule by mouth Daily.  Dispense: 90 capsule; Refill: 1            Follow Up   Return in about 6 months (around 4/18/2022) for Recheck.  Patient was given instructions and counseling regarding his condition or for health maintenance advice. Please see specific information pulled into the AVS if appropriate.

## 2021-10-18 NOTE — PROGRESS NOTES
Venipuncture Blood Specimen Collection  Venipuncture performed in left arm by Sarah Lee with good hemostasis. Patient tolerated the procedure well without complications.   10/18/21   Sarah Lee

## 2021-10-21 DIAGNOSIS — R97.20 ELEVATED PSA: Primary | ICD-10-CM

## 2021-10-21 NOTE — PROGRESS NOTES
Call pt:  Labs showed no significant abnormalities except for elevated psa.  I have referred you to urology for this to further investigate and treat the prostate.  Follow-up in office if you have any questions.

## 2022-02-22 ENCOUNTER — OFFICE VISIT (OUTPATIENT)
Dept: FAMILY MEDICINE CLINIC | Facility: CLINIC | Age: 72
End: 2022-02-22

## 2022-02-22 VITALS
OXYGEN SATURATION: 96 % | DIASTOLIC BLOOD PRESSURE: 82 MMHG | SYSTOLIC BLOOD PRESSURE: 170 MMHG | BODY MASS INDEX: 44.46 KG/M2 | TEMPERATURE: 98.2 F | HEIGHT: 69 IN | WEIGHT: 300.2 LBS | HEART RATE: 80 BPM

## 2022-02-22 DIAGNOSIS — J30.9 CHRONIC ALLERGIC RHINITIS: ICD-10-CM

## 2022-02-22 DIAGNOSIS — G89.29 CHRONIC PAIN OF BOTH KNEES: Primary | ICD-10-CM

## 2022-02-22 DIAGNOSIS — M25.562 CHRONIC PAIN OF BOTH KNEES: Primary | ICD-10-CM

## 2022-02-22 DIAGNOSIS — M25.561 CHRONIC PAIN OF BOTH KNEES: Primary | ICD-10-CM

## 2022-02-22 DIAGNOSIS — I10 PRIMARY HYPERTENSION: ICD-10-CM

## 2022-02-22 PROCEDURE — 99214 OFFICE O/P EST MOD 30 MIN: CPT | Performed by: FAMILY MEDICINE

## 2022-02-22 RX ORDER — LISINOPRIL AND HYDROCHLOROTHIAZIDE 20; 12.5 MG/1; MG/1
1 TABLET ORAL DAILY
Qty: 90 TABLET | Refills: 1 | Status: SHIPPED | OUTPATIENT
Start: 2022-02-22 | End: 2022-03-08 | Stop reason: SDUPTHER

## 2022-02-22 RX ORDER — CELECOXIB 200 MG/1
200 CAPSULE ORAL DAILY
Qty: 30 CAPSULE | Refills: 0 | Status: SHIPPED | OUTPATIENT
Start: 2022-02-22 | End: 2022-03-22 | Stop reason: SDUPTHER

## 2022-02-22 NOTE — PROGRESS NOTES
Venipuncture Blood Specimen Collection  Venipuncture performed in right arm  by Sandra Mcclain with good hemostasis. Patient tolerated the procedure well without complications.   02/22/22   Sandra Mcclain

## 2022-02-22 NOTE — PROGRESS NOTES
Chief Complaint  Knee Pain (Knees feel tired and weak x 4 years. )    Subjective          Arley Falk presents to [facility]  Pt has chronic knee pain bilaterally x 3+ yrs- gradual onset- worsening pain- pt has the sensation that they will give out- pt has had no falls- pain is an ache    Pt has chronic allergi rhinitis and wants to see ent      Objective   No Known Allergies    There is no immunization history on file for this patient.  History reviewed. No pertinent past medical history.   History reviewed. No pertinent surgical history.   Social History     Socioeconomic History   • Marital status:    Tobacco Use   • Smoking status: Former Smoker   • Smokeless tobacco: Former User     Quit date: 10/18/2017   • Tobacco comment: quit 1987   Vaping Use   • Vaping Use: Never used        Current Outpatient Medications:   •  allopurinol (ZYLOPRIM) 100 MG tablet, Take 1 tablet by mouth Daily., Disp: 90 tablet, Rfl: 1  •  amLODIPine (NORVASC) 5 MG tablet, Take 1 tablet by mouth Daily., Disp: 90 tablet, Rfl: 1  •  montelukast (SINGULAIR) 10 MG tablet, Take 1 tablet by mouth Daily., Disp: 90 tablet, Rfl: 1  •  Omega-3 Fatty Acids (fish oil) 1000 MG capsule capsule, Take 2 capsules by mouth 2 (Two) Times a Day With Meals., Disp: 360 capsule, Rfl: 1  •  tamsulosin (FLOMAX) 0.4 MG capsule 24 hr capsule, Take 1 capsule by mouth Daily., Disp: 90 capsule, Rfl: 1  •  vitamin E 600 UNIT capsule, vitamin E 600 unit oral capsule take 1 capsule by oral route daily   Active, Disp: , Rfl:   •  celecoxib (CeleBREX) 200 MG capsule, Take 1 capsule by mouth Daily., Disp: 30 capsule, Rfl: 0  •  lisinopril-hydrochlorothiazide (PRINZIDE,ZESTORETIC) 20-12.5 MG per tablet, Take 1 tablet by mouth Daily., Disp: 90 tablet, Rfl: 1  •  Nutritional Supplements (NUTRITIONAL SUPPLEMENT PO), Take  by mouth. Crave Supplement, Disp: , Rfl:    History reviewed. No pertinent family history.       Vital  "Signs:   Vitals:    02/22/22 1506   BP: 170/82   Pulse: 80   Temp: 98.2 °F (36.8 °C)   SpO2: 96%   Weight: (!) 136 kg (300 lb 3.2 oz)   Height: 175.3 cm (69\")       Review of Systems   Constitutional: Negative for chills, fatigue and fever.   HENT: Negative for sore throat.    Respiratory: Negative for chest tightness, shortness of breath and wheezing.    Cardiovascular: Negative for chest pain, palpitations and leg swelling.   Gastrointestinal: Negative for abdominal pain, diarrhea, nausea and vomiting.   Musculoskeletal: Positive for arthralgias.   Skin: Negative for rash.   Neurological: Negative for light-headedness and headaches.      Physical Exam  Vitals reviewed.   Constitutional:       Appearance: Normal appearance. He is well-developed.   HENT:      Head: Normocephalic and atraumatic.      Right Ear: External ear normal.      Left Ear: External ear normal.      Mouth/Throat:      Pharynx: No oropharyngeal exudate.   Eyes:      Conjunctiva/sclera: Conjunctivae normal.      Pupils: Pupils are equal, round, and reactive to light.   Cardiovascular:      Rate and Rhythm: Normal rate and regular rhythm.      Pulses: Normal pulses.      Heart sounds: Normal heart sounds. No murmur heard.  No friction rub. No gallop.    Pulmonary:      Effort: Pulmonary effort is normal.      Breath sounds: Normal breath sounds. No wheezing or rhonchi.   Abdominal:      General: Bowel sounds are normal. There is no distension.      Palpations: Abdomen is soft.      Tenderness: There is no abdominal tenderness.   Musculoskeletal:         General: Normal range of motion.      Comments: Bilateral generalized knee tenderness and mild swelling, no redness, warmth, or bruising, normal ROM, stable.   Skin:     General: Skin is warm and dry.      Capillary Refill: Capillary refill takes less than 2 seconds.   Neurological:      General: No focal deficit present.      Mental Status: He is alert and oriented to person, place, and time.      " Cranial Nerves: No cranial nerve deficit.   Psychiatric:         Mood and Affect: Mood and affect normal.         Behavior: Behavior normal.         Thought Content: Thought content normal.         Judgment: Judgment normal.        Result Review :   The following data was reviewed by: Alfred Church MD on 02/22/2022:  CMP    CMP 4/1/21 10/18/21   Glucose 116 (A) 124 (A)   BUN 11 12   Creatinine 1.01 0.87   eGFR Non African Am  86   Sodium 139 138   Potassium 4.3 4.2   Chloride 103 103   Calcium 9.1 9.1   Albumin 4.5 4.90   Total Bilirubin 0.62 0.7   Alkaline Phosphatase 66 62   AST (SGOT) 18 27   ALT (SGPT) 22 28   (A) Abnormal value            CBC    CBC 4/1/21 10/18/21   WBC 6.80 5.17   RBC 4.76 4.60   Hemoglobin 14.7 14.8   Hematocrit 44.9 41.9   MCV 94.3 91.1   MCH 30.9 32.2   MCHC 32.7 (A) 35.3   RDW 12.6 12.4   Platelets 152 149   (A) Abnormal value            Lipid Panel    Lipid Panel 4/1/21 10/18/21   Total Cholesterol  156   Total Cholesterol 162    Triglycerides 185 (A) 105   HDL Cholesterol 37 (A) 36 (A)   VLDL Cholesterol 37 19   LDL Cholesterol  88 101 (A)   LDL/HDL Ratio  2.75   (A) Abnormal value       Comments are available for some flowsheets but are not being displayed.           TSH    TSH 10/18/21   TSH 2.300           Data reviewed: Radiologic studies I viewed and interpreted 3 views of both knees x-rays:no fxs.          Assessment and Plan    Diagnoses and all orders for this visit:    1. Chronic pain of both knees (Primary)  -     XR Knee 1 or 2 View Bilateral (In Office)  -     Ambulatory Referral to Orthopedic Surgery  -     celecoxib (CeleBREX) 200 MG capsule; Take 1 capsule by mouth Daily.  Dispense: 30 capsule; Refill: 0    2. Primary hypertension  -     lisinopril-hydrochlorothiazide (PRINZIDE,ZESTORETIC) 20-12.5 MG per tablet; Take 1 tablet by mouth Daily.  Dispense: 90 tablet; Refill: 1    3. Chronic allergic rhinitis  -     Ambulatory Referral to ENT  (Otolaryngology)            Follow Up   Return in about 2 weeks (around 3/8/2022) for Recheck.  Patient was given instructions and counseling regarding his condition or for health maintenance advice. Please see specific information pulled into the AVS if appropriate.

## 2022-03-08 ENCOUNTER — OFFICE VISIT (OUTPATIENT)
Dept: FAMILY MEDICINE CLINIC | Facility: CLINIC | Age: 72
End: 2022-03-08

## 2022-03-08 VITALS
OXYGEN SATURATION: 99 % | WEIGHT: 291.2 LBS | HEART RATE: 90 BPM | TEMPERATURE: 97.3 F | DIASTOLIC BLOOD PRESSURE: 78 MMHG | SYSTOLIC BLOOD PRESSURE: 156 MMHG | BODY MASS INDEX: 43 KG/M2

## 2022-03-08 DIAGNOSIS — E78.2 MIXED HYPERLIPIDEMIA: Primary | ICD-10-CM

## 2022-03-08 DIAGNOSIS — J30.89 NON-SEASONAL ALLERGIC RHINITIS, UNSPECIFIED TRIGGER: ICD-10-CM

## 2022-03-08 DIAGNOSIS — I10 PRIMARY HYPERTENSION: ICD-10-CM

## 2022-03-08 DIAGNOSIS — R73.03 PREDIABETES: ICD-10-CM

## 2022-03-08 PROBLEM — M10.9 GOUT: Status: ACTIVE | Noted: 2022-03-01

## 2022-03-08 PROBLEM — J30.2 SEASONAL ALLERGIC RHINITIS: Status: ACTIVE | Noted: 2022-03-08

## 2022-03-08 PROBLEM — N40.0 ENLARGED PROSTATE: Status: ACTIVE | Noted: 2022-03-08

## 2022-03-08 PROBLEM — G47.30 SLEEP APNEA: Status: ACTIVE | Noted: 2022-03-08

## 2022-03-08 PROBLEM — E78.00 HYPERCHOLESTEROLEMIA: Status: ACTIVE | Noted: 2022-03-01

## 2022-03-08 PROCEDURE — 99214 OFFICE O/P EST MOD 30 MIN: CPT | Performed by: FAMILY MEDICINE

## 2022-03-08 RX ORDER — MONTELUKAST SODIUM 10 MG/1
10 TABLET ORAL DAILY
Qty: 90 TABLET | Refills: 1 | Status: SHIPPED | OUTPATIENT
Start: 2022-03-08 | End: 2022-08-31

## 2022-03-08 RX ORDER — LISINOPRIL AND HYDROCHLOROTHIAZIDE 20; 12.5 MG/1; MG/1
2 TABLET ORAL DAILY
Qty: 180 TABLET | Refills: 0 | Status: SHIPPED | OUTPATIENT
Start: 2022-03-08 | End: 2022-10-20 | Stop reason: SDUPTHER

## 2022-03-08 RX ORDER — MIRABEGRON 50 MG/1
TABLET, FILM COATED, EXTENDED RELEASE ORAL
COMMUNITY
Start: 2022-03-02

## 2022-03-08 NOTE — PROGRESS NOTES
Chief Complaint  Follow-up (Knees doing well)    Subjective          Arley Falk presents to [facility]  Pt said knee pain has resolved      Hypertension  This is a chronic problem. The current episode started more than 1 year ago. The problem has been gradually worsening since onset. The problem is uncontrolled. Pertinent negatives include no anxiety, blurred vision, chest pain, headaches, malaise/fatigue, neck pain, orthopnea, palpitations, peripheral edema, PND, shortness of breath or sweats. There are no associated agents to hypertension. Risk factors for coronary artery disease include dyslipidemia, male gender and family history. Current antihypertension treatment includes ACE inhibitors and diuretics. The current treatment provides mild improvement. There are no compliance problems.        Objective   No Known Allergies  Immunization History   Administered Date(s) Administered   • COVID-19 (MODERNA) 1st, 2nd, 3rd Dose Only 02/25/2021, 03/25/2021   • COVID-19 (UNSPECIFIED) 11/02/2021   • Pneumococcal Polysaccharide (PPSV23) 03/18/2020     History reviewed. No pertinent past medical history.   History reviewed. No pertinent surgical history.   Social History     Socioeconomic History   • Marital status:    Tobacco Use   • Smoking status: Former Smoker   • Smokeless tobacco: Former User     Quit date: 10/18/2017   • Tobacco comment: quit 1987   Vaping Use   • Vaping Use: Never used        Current Outpatient Medications:   •  allopurinol (ZYLOPRIM) 100 MG tablet, Take 1 tablet by mouth Daily., Disp: 90 tablet, Rfl: 1  •  amLODIPine (NORVASC) 5 MG tablet, Take 1 tablet by mouth Daily., Disp: 90 tablet, Rfl: 1  •  celecoxib (CeleBREX) 200 MG capsule, Take 1 capsule by mouth Daily., Disp: 30 capsule, Rfl: 0  •  lisinopril-hydrochlorothiazide (PRINZIDE,ZESTORETIC) 20-12.5 MG per tablet, Take 2 tablets by mouth Daily., Disp: 180 tablet, Rfl: 0  •  montelukast  (SINGULAIR) 10 MG tablet, Take 1 tablet by mouth Daily., Disp: 90 tablet, Rfl: 1  •  Myrbetriq 50 MG tablet sustained-release 24 hour 24 hr tablet, TAKE 1 TABLET BY MOUTH EVERY 24 HOURS, Disp: , Rfl:   •  Nutritional Supplements (NUTRITIONAL SUPPLEMENT PO), Take  by mouth. Crave Supplement, Disp: , Rfl:   •  Omega-3 Fatty Acids (fish oil) 1000 MG capsule capsule, Take 2 capsules by mouth 2 (Two) Times a Day With Meals., Disp: 360 capsule, Rfl: 1  •  tamsulosin (FLOMAX) 0.4 MG capsule 24 hr capsule, Take 1 capsule by mouth Daily., Disp: 90 capsule, Rfl: 1  •  vitamin E 600 UNIT capsule, vitamin E 600 unit oral capsule take 1 capsule by oral route daily   Active, Disp: , Rfl:    History reviewed. No pertinent family history.       Vital Signs:   Vitals:    03/08/22 1252   BP: 156/78   BP Location: Left arm   Patient Position: Sitting   Pulse: 90   Temp: 97.3 °F (36.3 °C)   SpO2: 99%   Weight: 132 kg (291 lb 3.2 oz)       Review of Systems   Constitutional: Negative for malaise/fatigue.   Eyes: Negative for blurred vision.   Respiratory: Negative for shortness of breath.    Cardiovascular: Negative for chest pain, palpitations, orthopnea and PND.   Musculoskeletal: Negative for neck pain.   Neurological: Negative for headaches.      Physical Exam  Vitals reviewed.   Constitutional:       Appearance: Normal appearance. He is well-developed.   HENT:      Head: Normocephalic and atraumatic.      Right Ear: External ear normal.      Left Ear: External ear normal.      Mouth/Throat:      Pharynx: No oropharyngeal exudate.   Eyes:      Conjunctiva/sclera: Conjunctivae normal.      Pupils: Pupils are equal, round, and reactive to light.   Cardiovascular:      Rate and Rhythm: Normal rate and regular rhythm.      Pulses: Normal pulses.      Heart sounds: Normal heart sounds. No murmur heard.    No friction rub. No gallop.   Pulmonary:      Effort: Pulmonary effort is normal.      Breath sounds: Normal breath sounds. No  wheezing or rhonchi.   Abdominal:      General: Abdomen is flat. Bowel sounds are normal. There is no distension.      Palpations: Abdomen is soft. There is no mass.      Tenderness: There is no abdominal tenderness. There is no guarding or rebound.      Hernia: No hernia is present.   Musculoskeletal:         General: Normal range of motion.   Skin:     General: Skin is warm and dry.      Capillary Refill: Capillary refill takes less than 2 seconds.   Neurological:      General: No focal deficit present.      Mental Status: He is alert and oriented to person, place, and time.      Cranial Nerves: No cranial nerve deficit.   Psychiatric:         Mood and Affect: Mood and affect normal.         Behavior: Behavior normal.         Thought Content: Thought content normal.         Judgment: Judgment normal.        Result Review :   The following data was reviewed by: Alfred Church MD on 03/08/2022:  CMP    CMP 4/1/21 10/18/21   Glucose 116 (A) 124 (A)   BUN 11 12   Creatinine 1.01 0.87   eGFR Non African Am  86   Sodium 139 138   Potassium 4.3 4.2   Chloride 103 103   Calcium 9.1 9.1   Albumin 4.5 4.90   Total Bilirubin 0.62 0.7   Alkaline Phosphatase 66 62   AST (SGOT) 18 27   ALT (SGPT) 22 28   (A) Abnormal value            CBC    CBC 4/1/21 10/18/21   WBC 6.80 5.17   RBC 4.76 4.60   Hemoglobin 14.7 14.8   Hematocrit 44.9 41.9   MCV 94.3 91.1   MCH 30.9 32.2   MCHC 32.7 (A) 35.3   RDW 12.6 12.4   Platelets 152 149   (A) Abnormal value            Lipid Panel    Lipid Panel 4/1/21 10/18/21   Total Cholesterol  156   Total Cholesterol 162    Triglycerides 185 (A) 105   HDL Cholesterol 37 (A) 36 (A)   VLDL Cholesterol 37 19   LDL Cholesterol  88 101 (A)   LDL/HDL Ratio  2.75   (A) Abnormal value       Comments are available for some flowsheets but are not being displayed.           TSH    TSH 10/18/21   TSH 2.300           Most Recent A1C    HGBA1C Most Recent 10/18/21   Hemoglobin A1C 5.70 (A)   (A) Abnormal value             PSA    PSA 10/18/21   PSA 4.270 (A)   (A) Abnormal value                      Assessment and Plan    Diagnoses and all orders for this visit:    1. Mixed hyperlipidemia (Primary)  -     Lipid Panel    2. Non-seasonal allergic rhinitis, unspecified trigger  -     montelukast (SINGULAIR) 10 MG tablet; Take 1 tablet by mouth Daily.  Dispense: 90 tablet; Refill: 1    3. Primary hypertension  -     lisinopril-hydrochlorothiazide (PRINZIDE,ZESTORETIC) 20-12.5 MG per tablet; Take 2 tablets by mouth Daily.  Dispense: 180 tablet; Refill: 0  -     CBC Auto Differential  -     Comprehensive Metabolic Panel  -     Lipid Panel  -     TSH+Free T4    4. Prediabetes  -     Hemoglobin A1c            Follow Up   Return in about 2 weeks (around 3/22/2022) for Medicare Wellness.  Patient was given instructions and counseling regarding his condition or for health maintenance advice. Please see specific information pulled into the AVS if appropriate.

## 2022-03-22 ENCOUNTER — OFFICE VISIT (OUTPATIENT)
Dept: FAMILY MEDICINE CLINIC | Facility: CLINIC | Age: 72
End: 2022-03-22

## 2022-03-22 VITALS
TEMPERATURE: 97.8 F | WEIGHT: 292 LBS | DIASTOLIC BLOOD PRESSURE: 65 MMHG | HEART RATE: 85 BPM | OXYGEN SATURATION: 99 % | SYSTOLIC BLOOD PRESSURE: 118 MMHG | BODY MASS INDEX: 43.12 KG/M2

## 2022-03-22 DIAGNOSIS — Z00.00 MEDICARE ANNUAL WELLNESS VISIT, SUBSEQUENT: Primary | ICD-10-CM

## 2022-03-22 DIAGNOSIS — G89.29 CHRONIC PAIN OF BOTH KNEES: ICD-10-CM

## 2022-03-22 DIAGNOSIS — M25.562 CHRONIC PAIN OF BOTH KNEES: ICD-10-CM

## 2022-03-22 DIAGNOSIS — M25.561 CHRONIC PAIN OF BOTH KNEES: ICD-10-CM

## 2022-03-22 LAB
ALBUMIN SERPL-MCNC: 5.1 G/DL (ref 3.5–5.2)
ALBUMIN/GLOB SERPL: 2 G/DL
ALP SERPL-CCNC: 70 U/L (ref 39–117)
ALT SERPL W P-5'-P-CCNC: 25 U/L (ref 1–41)
ANION GAP SERPL CALCULATED.3IONS-SCNC: 9.8 MMOL/L (ref 5–15)
AST SERPL-CCNC: 18 U/L (ref 1–40)
BASOPHILS # BLD AUTO: 0.05 10*3/MM3 (ref 0–0.2)
BASOPHILS NFR BLD AUTO: 0.5 % (ref 0–1.5)
BILIRUB SERPL-MCNC: 0.7 MG/DL (ref 0–1.2)
BUN SERPL-MCNC: 15 MG/DL (ref 8–23)
BUN/CREAT SERPL: 15.8 (ref 7–25)
CALCIUM SPEC-SCNC: 10 MG/DL (ref 8.6–10.5)
CHLORIDE SERPL-SCNC: 97 MMOL/L (ref 98–107)
CHOLEST SERPL-MCNC: 171 MG/DL (ref 0–200)
CO2 SERPL-SCNC: 29.2 MMOL/L (ref 22–29)
CREAT SERPL-MCNC: 0.95 MG/DL (ref 0.76–1.27)
DEPRECATED RDW RBC AUTO: 43.8 FL (ref 37–54)
EGFRCR SERPLBLD CKD-EPI 2021: 84.5 ML/MIN/1.73
EOSINOPHIL # BLD AUTO: 0.13 10*3/MM3 (ref 0–0.4)
EOSINOPHIL NFR BLD AUTO: 1.4 % (ref 0.3–6.2)
ERYTHROCYTE [DISTWIDTH] IN BLOOD BY AUTOMATED COUNT: 12.6 % (ref 12.3–15.4)
GLOBULIN UR ELPH-MCNC: 2.5 GM/DL
GLUCOSE SERPL-MCNC: 100 MG/DL (ref 65–99)
HBA1C MFR BLD: 5.9 % (ref 4.8–5.6)
HCT VFR BLD AUTO: 45.9 % (ref 37.5–51)
HDLC SERPL-MCNC: 44 MG/DL (ref 40–60)
HGB BLD-MCNC: 15.5 G/DL (ref 13–17.7)
IMM GRANULOCYTES # BLD AUTO: 0.06 10*3/MM3 (ref 0–0.05)
IMM GRANULOCYTES NFR BLD AUTO: 0.6 % (ref 0–0.5)
LDLC SERPL CALC-MCNC: 104 MG/DL (ref 0–100)
LDLC/HDLC SERPL: 2.3 {RATIO}
LYMPHOCYTES # BLD AUTO: 1.7 10*3/MM3 (ref 0.7–3.1)
LYMPHOCYTES NFR BLD AUTO: 18.4 % (ref 19.6–45.3)
MCH RBC QN AUTO: 32.1 PG (ref 26.6–33)
MCHC RBC AUTO-ENTMCNC: 33.8 G/DL (ref 31.5–35.7)
MCV RBC AUTO: 95 FL (ref 79–97)
MONOCYTES # BLD AUTO: 0.61 10*3/MM3 (ref 0.1–0.9)
MONOCYTES NFR BLD AUTO: 6.6 % (ref 5–12)
NEUTROPHILS NFR BLD AUTO: 6.69 10*3/MM3 (ref 1.7–7)
NEUTROPHILS NFR BLD AUTO: 72.5 % (ref 42.7–76)
NRBC BLD AUTO-RTO: 0 /100 WBC (ref 0–0.2)
PLATELET # BLD AUTO: 190 10*3/MM3 (ref 140–450)
PMV BLD AUTO: 10.7 FL (ref 6–12)
POTASSIUM SERPL-SCNC: 4.9 MMOL/L (ref 3.5–5.2)
PROT SERPL-MCNC: 7.6 G/DL (ref 6–8.5)
RBC # BLD AUTO: 4.83 10*6/MM3 (ref 4.14–5.8)
SODIUM SERPL-SCNC: 136 MMOL/L (ref 136–145)
T4 FREE SERPL-MCNC: 1.55 NG/DL (ref 0.93–1.7)
TRIGL SERPL-MCNC: 128 MG/DL (ref 0–150)
TSH SERPL DL<=0.05 MIU/L-ACNC: 2.37 UIU/ML (ref 0.27–4.2)
VLDLC SERPL-MCNC: 23 MG/DL (ref 5–40)
WBC NRBC COR # BLD: 9.24 10*3/MM3 (ref 3.4–10.8)

## 2022-03-22 PROCEDURE — 1159F MED LIST DOCD IN RCRD: CPT | Performed by: FAMILY MEDICINE

## 2022-03-22 PROCEDURE — 84439 ASSAY OF FREE THYROXINE: CPT | Performed by: FAMILY MEDICINE

## 2022-03-22 PROCEDURE — 80053 COMPREHEN METABOLIC PANEL: CPT | Performed by: FAMILY MEDICINE

## 2022-03-22 PROCEDURE — 85025 COMPLETE CBC W/AUTO DIFF WBC: CPT | Performed by: FAMILY MEDICINE

## 2022-03-22 PROCEDURE — 96160 PT-FOCUSED HLTH RISK ASSMT: CPT | Performed by: FAMILY MEDICINE

## 2022-03-22 PROCEDURE — 83036 HEMOGLOBIN GLYCOSYLATED A1C: CPT | Performed by: FAMILY MEDICINE

## 2022-03-22 PROCEDURE — 84443 ASSAY THYROID STIM HORMONE: CPT | Performed by: FAMILY MEDICINE

## 2022-03-22 PROCEDURE — 1170F FXNL STATUS ASSESSED: CPT | Performed by: FAMILY MEDICINE

## 2022-03-22 PROCEDURE — 36415 COLL VENOUS BLD VENIPUNCTURE: CPT | Performed by: FAMILY MEDICINE

## 2022-03-22 PROCEDURE — G0439 PPPS, SUBSEQ VISIT: HCPCS | Performed by: FAMILY MEDICINE

## 2022-03-22 PROCEDURE — 80061 LIPID PANEL: CPT | Performed by: FAMILY MEDICINE

## 2022-03-22 RX ORDER — CELECOXIB 200 MG/1
200 CAPSULE ORAL DAILY
Qty: 90 CAPSULE | Refills: 1 | Status: SHIPPED | OUTPATIENT
Start: 2022-03-22 | End: 2022-04-11

## 2022-03-22 NOTE — PROGRESS NOTES
Venipuncture Blood Specimen Collection  Venipuncture performed in left arm by Sarah Lee with good hemostasis. Patient tolerated the procedure well without complications.   03/22/22   Sarah Lee

## 2022-03-22 NOTE — PROGRESS NOTES
The ABCs of the Annual Wellness Visit  Subsequent Medicare Wellness Visit    Chief Complaint   Patient presents with   • Medicare Wellness-subsequent     Medicare physical       Subjective    History of Present Illness:  Arley Falk is a 71 y.o. male who presents for a Subsequent Medicare Wellness Visit.    The following portions of the patient's history were reviewed and   updated as appropriate:   He  has no past medical history on file.  He does not have any pertinent problems on file.  He  has no past surgical history on file.  His family history is not on file.  He  reports that he has quit smoking. He quit smokeless tobacco use about 4 years ago. No history on file for alcohol use and drug use.  Current Outpatient Medications   Medication Sig Dispense Refill   • allopurinol (ZYLOPRIM) 100 MG tablet Take 1 tablet by mouth Daily. 90 tablet 1   • amLODIPine (NORVASC) 5 MG tablet Take 1 tablet by mouth Daily. 90 tablet 1   • celecoxib (CeleBREX) 200 MG capsule Take 1 capsule by mouth Daily. 90 capsule 1   • lisinopril-hydrochlorothiazide (PRINZIDE,ZESTORETIC) 20-12.5 MG per tablet Take 2 tablets by mouth Daily. 180 tablet 0   • montelukast (SINGULAIR) 10 MG tablet Take 1 tablet by mouth Daily. 90 tablet 1   • Myrbetriq 50 MG tablet sustained-release 24 hour 24 hr tablet TAKE 1 TABLET BY MOUTH EVERY 24 HOURS     • Nutritional Supplements (NUTRITIONAL SUPPLEMENT PO) Take  by mouth. Crave Supplement     • Omega-3 Fatty Acids (fish oil) 1000 MG capsule capsule Take 2 capsules by mouth 2 (Two) Times a Day With Meals. 360 capsule 1   • tamsulosin (FLOMAX) 0.4 MG capsule 24 hr capsule Take 1 capsule by mouth Daily. 90 capsule 1   • vitamin E 600 UNIT capsule vitamin E 600 unit oral capsule take 1 capsule by oral route daily   Active       No current facility-administered medications for this visit.     Current Outpatient Medications on File Prior to Visit   Medication Sig   • allopurinol (ZYLOPRIM) 100 MG  tablet Take 1 tablet by mouth Daily.   • amLODIPine (NORVASC) 5 MG tablet Take 1 tablet by mouth Daily.   • lisinopril-hydrochlorothiazide (PRINZIDE,ZESTORETIC) 20-12.5 MG per tablet Take 2 tablets by mouth Daily.   • montelukast (SINGULAIR) 10 MG tablet Take 1 tablet by mouth Daily.   • Myrbetriq 50 MG tablet sustained-release 24 hour 24 hr tablet TAKE 1 TABLET BY MOUTH EVERY 24 HOURS   • Nutritional Supplements (NUTRITIONAL SUPPLEMENT PO) Take  by mouth. Crave Supplement   • Omega-3 Fatty Acids (fish oil) 1000 MG capsule capsule Take 2 capsules by mouth 2 (Two) Times a Day With Meals.   • tamsulosin (FLOMAX) 0.4 MG capsule 24 hr capsule Take 1 capsule by mouth Daily.   • vitamin E 600 UNIT capsule vitamin E 600 unit oral capsule take 1 capsule by oral route daily   Active   • [DISCONTINUED] celecoxib (CeleBREX) 200 MG capsule Take 1 capsule by mouth Daily.     No current facility-administered medications on file prior to visit.     He has No Known Allergies..    Compared to one year ago, the patient feels his physical   health is the same.    Compared to one year ago, the patient feels his mental   health is the same.    Recent Hospitalizations:  He was not admitted to the hospital during the last year.       Current Medical Providers:  Patient Care Team:  Alfred Church MD as PCP - General (Family Medicine)    Outpatient Medications Prior to Visit   Medication Sig Dispense Refill   • allopurinol (ZYLOPRIM) 100 MG tablet Take 1 tablet by mouth Daily. 90 tablet 1   • amLODIPine (NORVASC) 5 MG tablet Take 1 tablet by mouth Daily. 90 tablet 1   • lisinopril-hydrochlorothiazide (PRINZIDE,ZESTORETIC) 20-12.5 MG per tablet Take 2 tablets by mouth Daily. 180 tablet 0   • montelukast (SINGULAIR) 10 MG tablet Take 1 tablet by mouth Daily. 90 tablet 1   • Myrbetriq 50 MG tablet sustained-release 24 hour 24 hr tablet TAKE 1 TABLET BY MOUTH EVERY 24 HOURS     • Nutritional Supplements (NUTRITIONAL SUPPLEMENT PO) Take   by mouth. Crave Supplement     • Omega-3 Fatty Acids (fish oil) 1000 MG capsule capsule Take 2 capsules by mouth 2 (Two) Times a Day With Meals. 360 capsule 1   • tamsulosin (FLOMAX) 0.4 MG capsule 24 hr capsule Take 1 capsule by mouth Daily. 90 capsule 1   • vitamin E 600 UNIT capsule vitamin E 600 unit oral capsule take 1 capsule by oral route daily   Active     • celecoxib (CeleBREX) 200 MG capsule Take 1 capsule by mouth Daily. 30 capsule 0     No facility-administered medications prior to visit.       No opioid medication identified on active medication list. I have reviewed chart for other potential  high risk medication/s and harmful drug interactions in the elderly.          Aspirin is not on active medication list.  Aspirin use is not indicated based on review of current medical condition/s. Risk of harm outweighs potential benefits.  .    Patient Active Problem List   Diagnosis   • Enlarged prostate   • Gout   • Hypercholesterolemia   • Primary hypertension   • Non-seasonal allergic rhinitis   • Sleep apnea     Advance Care Planning  Advance Directive is not on file.  ACP discussion was held with the patient during this visit. Patient does not have an advance directive, information provided.    Review of Systems   Constitutional: Negative for fatigue and fever.   HENT: Negative for sore throat.    Eyes: Negative for visual disturbance.   Respiratory: Negative for apnea, cough, chest tightness, shortness of breath and wheezing.    Cardiovascular: Negative for chest pain, palpitations and leg swelling.   Gastrointestinal: Negative for abdominal pain, diarrhea, nausea and vomiting.   Musculoskeletal: Positive for arthralgias.   Skin: Negative for rash.   Neurological: Negative for light-headedness.   Psychiatric/Behavioral: Negative for behavioral problems, decreased concentration, sleep disturbance and suicidal ideas. The patient is not nervous/anxious.         Objective    Vitals:    03/22/22 1307   BP:  118/65   Pulse: 85   Temp: 97.8 °F (36.6 °C)   SpO2: 99%   Weight: 132 kg (292 lb)     BMI Readings from Last 1 Encounters:   03/22/22 43.12 kg/m²   BMI is above normal parameters. Recommendations include: exercise counseling and nutrition counseling    Does the patient have evidence of cognitive impairment? No    Physical Exam  Vitals reviewed.   Constitutional:       Appearance: Normal appearance. He is well-developed.   HENT:      Head: Normocephalic and atraumatic.      Right Ear: External ear normal.      Left Ear: External ear normal.      Mouth/Throat:      Mouth: Mucous membranes are moist.      Pharynx: Oropharynx is clear. No oropharyngeal exudate or posterior oropharyngeal erythema.   Eyes:      Conjunctiva/sclera: Conjunctivae normal.      Pupils: Pupils are equal, round, and reactive to light.   Cardiovascular:      Rate and Rhythm: Normal rate and regular rhythm.      Pulses: Normal pulses.      Heart sounds: Normal heart sounds. No murmur heard.    No friction rub. No gallop.   Pulmonary:      Effort: Pulmonary effort is normal.      Breath sounds: Normal breath sounds. No wheezing or rhonchi.   Abdominal:      General: Abdomen is flat. Bowel sounds are normal. There is no distension.      Palpations: Abdomen is soft. There is no mass.      Tenderness: There is no abdominal tenderness. There is no guarding or rebound.      Hernia: No hernia is present.   Musculoskeletal:         General: Normal range of motion.      Cervical back: Normal range of motion and neck supple.   Skin:     General: Skin is warm and dry.      Capillary Refill: Capillary refill takes less than 2 seconds.   Neurological:      General: No focal deficit present.      Mental Status: He is alert and oriented to person, place, and time.      Cranial Nerves: No cranial nerve deficit.   Psychiatric:         Mood and Affect: Mood and affect normal.         Behavior: Behavior normal.         Thought Content: Thought content normal.          Judgment: Judgment normal.                 HEALTH RISK ASSESSMENT    Smoking Status:  Social History     Tobacco Use   Smoking Status Former Smoker   Smokeless Tobacco Former User   • Quit date: 10/18/2017   Tobacco Comment    quit 1987     Alcohol Consumption:  Social History     Substance and Sexual Activity   Alcohol Use None     Fall Risk Screen:    GAIL Fall Risk Assessment was completed, and patient is at LOW risk for falls.Assessment completed on:3/22/2022    Depression Screening:  PHQ-2/PHQ-9 Depression Screening 3/22/2022   Retired PHQ-9 Total Score -   Retired Total Score -   Little Interest or Pleasure in Doing Things 0-->not at all   Feeling Down, Depressed or Hopeless 0-->not at all   PHQ-9: Brief Depression Severity Measure Score 0       Health Habits and Functional and Cognitive Screening:  Functional & Cognitive Status 3/22/2022   Do you have difficulty preparing food and eating? No   Do you have difficulty bathing yourself, getting dressed or grooming yourself? No   Do you have difficulty using the toilet? No   Do you have difficulty moving around from place to place? No   Do you have trouble with steps or getting out of a bed or a chair? No   Current Diet Well Balanced Diet   Dental Exam Up to date   Eye Exam Up to date   Exercise (times per week) 7 times per week   Current Exercises Include Walking   Do you need help using the phone?  No   Are you deaf or do you have serious difficulty hearing?  No   Do you need help with transportation? No   Do you need help shopping? No   Do you need help preparing meals?  No   Do you need help with housework?  No   Do you need help with laundry? No   Do you need help taking your medications? No   Do you need help managing money? No   Do you ever drive or ride in a car without wearing a seat belt? No   Have you felt unusual stress, anger or loneliness in the last month? No   Who do you live with? Spouse   If you need help, do you have trouble finding someone  available to you? No   Do you have difficulty concentrating, remembering or making decisions? No       Age-appropriate Screening Schedule:  Refer to the list below for future screening recommendations based on patient's age, sex and/or medical conditions. Orders for these recommended tests are listed in the plan section. The patient has been provided with a written plan.    Health Maintenance   Topic Date Due   • ZOSTER VACCINE (1 of 2) Never done   • LIPID PANEL  10/18/2022   • TDAP/TD VACCINES (2 - Td or Tdap) 02/25/2031   • INFLUENZA VACCINE  Completed              Assessment/Plan   CMS Preventative Services Quick Reference  Risk Factors Identified During Encounter  Immunizations Discussed/Encouraged (specific Immunizations; Shingrix  Obesity/Overweight   The above risks/problems have been discussed with the patient.  Follow up actions/plans if indicated are seen below in the Assessment/Plan Section.  Pertinent information has been shared with the patient in the After Visit Summary.    Diagnoses and all orders for this visit:    1. Medicare annual wellness visit, subsequent (Primary)    2. Chronic pain of both knees  -     celecoxib (CeleBREX) 200 MG capsule; Take 1 capsule by mouth Daily.  Dispense: 90 capsule; Refill: 1        Follow Up:   Return in about 6 months (around 9/22/2022) for Recheck.     An After Visit Summary and PPPS were made available to the patient.

## 2022-04-10 DIAGNOSIS — I10 PRIMARY HYPERTENSION: ICD-10-CM

## 2022-04-10 DIAGNOSIS — M19.90 ARTHRITIS: ICD-10-CM

## 2022-04-10 DIAGNOSIS — M1A.9XX0 CHRONIC GOUT WITHOUT TOPHUS, UNSPECIFIED CAUSE, UNSPECIFIED SITE: ICD-10-CM

## 2022-04-11 RX ORDER — ALLOPURINOL 100 MG/1
100 TABLET ORAL DAILY
Qty: 90 TABLET | Refills: 1 | Status: SHIPPED | OUTPATIENT
Start: 2022-04-11 | End: 2022-11-11 | Stop reason: SDUPTHER

## 2022-04-11 RX ORDER — MELOXICAM 7.5 MG/1
7.5 TABLET ORAL DAILY
Qty: 90 TABLET | Refills: 1 | Status: SHIPPED | OUTPATIENT
Start: 2022-04-11 | End: 2022-10-20

## 2022-04-11 RX ORDER — LISINOPRIL 20 MG/1
20 TABLET ORAL DAILY
Qty: 90 TABLET | Refills: 1 | Status: SHIPPED | OUTPATIENT
Start: 2022-04-11 | End: 2022-04-12

## 2022-04-11 RX ORDER — AMLODIPINE BESYLATE 5 MG/1
5 TABLET ORAL DAILY
Qty: 90 TABLET | Refills: 1 | Status: SHIPPED | OUTPATIENT
Start: 2022-04-11 | End: 2022-11-11 | Stop reason: SDUPTHER

## 2022-04-12 ENCOUNTER — TELEPHONE (OUTPATIENT)
Dept: FAMILY MEDICINE CLINIC | Facility: CLINIC | Age: 72
End: 2022-04-12

## 2022-04-12 NOTE — TELEPHONE ENCOUNTER
Justa with [facility] called for med clarification for pt.  In March a RX for lisinopril/HCTZ 20-12.5 take 2 pills qd was sent in.  Yesterday a RX for Lisinopril 20 mg qd.  He wanted to know if this was a change or an addition to what he was already taking.

## 2022-05-02 DIAGNOSIS — N40.0 BENIGN PROSTATIC HYPERPLASIA WITHOUT LOWER URINARY TRACT SYMPTOMS: ICD-10-CM

## 2022-05-02 RX ORDER — TAMSULOSIN HYDROCHLORIDE 0.4 MG/1
0.4 CAPSULE ORAL DAILY
Qty: 90 CAPSULE | Refills: 1 | Status: SHIPPED | OUTPATIENT
Start: 2022-05-02 | End: 2022-11-07

## 2022-08-31 DIAGNOSIS — J30.89 NON-SEASONAL ALLERGIC RHINITIS, UNSPECIFIED TRIGGER: ICD-10-CM

## 2022-08-31 RX ORDER — MONTELUKAST SODIUM 10 MG/1
TABLET ORAL
Qty: 90 TABLET | Refills: 1 | Status: SHIPPED | OUTPATIENT
Start: 2022-08-31

## 2022-09-14 DIAGNOSIS — I10 PRIMARY HYPERTENSION: ICD-10-CM

## 2022-09-14 RX ORDER — LISINOPRIL AND HYDROCHLOROTHIAZIDE 20; 12.5 MG/1; MG/1
TABLET ORAL
Qty: 180 TABLET | Refills: 0 | OUTPATIENT
Start: 2022-09-14

## 2022-09-15 DIAGNOSIS — M25.561 CHRONIC PAIN OF BOTH KNEES: ICD-10-CM

## 2022-09-15 DIAGNOSIS — M25.562 CHRONIC PAIN OF BOTH KNEES: ICD-10-CM

## 2022-09-15 DIAGNOSIS — G89.29 CHRONIC PAIN OF BOTH KNEES: ICD-10-CM

## 2022-09-15 RX ORDER — CELECOXIB 200 MG/1
CAPSULE ORAL
Qty: 90 CAPSULE | Refills: 1 | OUTPATIENT
Start: 2022-09-15

## 2022-10-17 DIAGNOSIS — I10 PRIMARY HYPERTENSION: ICD-10-CM

## 2022-10-17 DIAGNOSIS — M1A.9XX0 CHRONIC GOUT WITHOUT TOPHUS, UNSPECIFIED CAUSE, UNSPECIFIED SITE: ICD-10-CM

## 2022-10-17 RX ORDER — ALLOPURINOL 100 MG/1
100 TABLET ORAL DAILY
Qty: 90 TABLET | Refills: 1 | OUTPATIENT
Start: 2022-10-17

## 2022-10-17 RX ORDER — AMLODIPINE BESYLATE 5 MG/1
5 TABLET ORAL DAILY
Qty: 90 TABLET | Refills: 1 | OUTPATIENT
Start: 2022-10-17

## 2022-10-20 ENCOUNTER — OFFICE VISIT (OUTPATIENT)
Dept: FAMILY MEDICINE CLINIC | Facility: CLINIC | Age: 72
End: 2022-10-20

## 2022-10-20 VITALS
TEMPERATURE: 97.8 F | DIASTOLIC BLOOD PRESSURE: 80 MMHG | SYSTOLIC BLOOD PRESSURE: 150 MMHG | BODY MASS INDEX: 42.65 KG/M2 | HEART RATE: 83 BPM | WEIGHT: 288.8 LBS | OXYGEN SATURATION: 100 %

## 2022-10-20 DIAGNOSIS — Z12.5 SCREENING PSA (PROSTATE SPECIFIC ANTIGEN): ICD-10-CM

## 2022-10-20 DIAGNOSIS — I10 PRIMARY HYPERTENSION: ICD-10-CM

## 2022-10-20 DIAGNOSIS — E78.2 MIXED HYPERLIPIDEMIA: ICD-10-CM

## 2022-10-20 DIAGNOSIS — M19.90 ARTHRITIS: Primary | ICD-10-CM

## 2022-10-20 DIAGNOSIS — R73.03 PREDIABETES: ICD-10-CM

## 2022-10-20 LAB
ALBUMIN SERPL-MCNC: 4.7 G/DL (ref 3.5–5.2)
ALBUMIN/GLOB SERPL: 1.8 G/DL
ALP SERPL-CCNC: 65 U/L (ref 39–117)
ALT SERPL W P-5'-P-CCNC: 22 U/L (ref 1–41)
ANION GAP SERPL CALCULATED.3IONS-SCNC: 12 MMOL/L (ref 5–15)
AST SERPL-CCNC: 22 U/L (ref 1–40)
BASOPHILS # BLD AUTO: 0.02 10*3/MM3 (ref 0–0.2)
BASOPHILS NFR BLD AUTO: 0.3 % (ref 0–1.5)
BILIRUB SERPL-MCNC: 0.5 MG/DL (ref 0–1.2)
BUN SERPL-MCNC: 8 MG/DL (ref 8–23)
BUN/CREAT SERPL: 8.5 (ref 7–25)
CALCIUM SPEC-SCNC: 9.7 MG/DL (ref 8.6–10.5)
CHLORIDE SERPL-SCNC: 102 MMOL/L (ref 98–107)
CHOLEST SERPL-MCNC: 178 MG/DL (ref 0–200)
CO2 SERPL-SCNC: 27 MMOL/L (ref 22–29)
CREAT SERPL-MCNC: 0.94 MG/DL (ref 0.76–1.27)
DEPRECATED RDW RBC AUTO: 40.6 FL (ref 37–54)
EGFRCR SERPLBLD CKD-EPI 2021: 85.1 ML/MIN/1.73
EOSINOPHIL # BLD AUTO: 0.11 10*3/MM3 (ref 0–0.4)
EOSINOPHIL NFR BLD AUTO: 1.5 % (ref 0.3–6.2)
ERYTHROCYTE [DISTWIDTH] IN BLOOD BY AUTOMATED COUNT: 12.3 % (ref 12.3–15.4)
GLOBULIN UR ELPH-MCNC: 2.6 GM/DL
GLUCOSE SERPL-MCNC: 111 MG/DL (ref 65–99)
HBA1C MFR BLD: 5.7 % (ref 4.8–5.6)
HCT VFR BLD AUTO: 40.4 % (ref 37.5–51)
HDLC SERPL-MCNC: 45 MG/DL (ref 40–60)
HGB BLD-MCNC: 14.8 G/DL (ref 13–17.7)
IMM GRANULOCYTES # BLD AUTO: 0.05 10*3/MM3 (ref 0–0.05)
IMM GRANULOCYTES NFR BLD AUTO: 0.7 % (ref 0–0.5)
LDLC SERPL CALC-MCNC: 109 MG/DL (ref 0–100)
LDLC/HDLC SERPL: 2.36 {RATIO}
LYMPHOCYTES # BLD AUTO: 1.26 10*3/MM3 (ref 0.7–3.1)
LYMPHOCYTES NFR BLD AUTO: 16.8 % (ref 19.6–45.3)
MCH RBC QN AUTO: 32.8 PG (ref 26.6–33)
MCHC RBC AUTO-ENTMCNC: 36.6 G/DL (ref 31.5–35.7)
MCV RBC AUTO: 89.6 FL (ref 79–97)
MONOCYTES # BLD AUTO: 0.47 10*3/MM3 (ref 0.1–0.9)
MONOCYTES NFR BLD AUTO: 6.3 % (ref 5–12)
NEUTROPHILS NFR BLD AUTO: 5.58 10*3/MM3 (ref 1.7–7)
NEUTROPHILS NFR BLD AUTO: 74.4 % (ref 42.7–76)
NRBC BLD AUTO-RTO: 0 /100 WBC (ref 0–0.2)
PLATELET # BLD AUTO: 176 10*3/MM3 (ref 140–450)
PMV BLD AUTO: 9.9 FL (ref 6–12)
POTASSIUM SERPL-SCNC: 4.7 MMOL/L (ref 3.5–5.2)
PROT SERPL-MCNC: 7.3 G/DL (ref 6–8.5)
PSA SERPL-MCNC: 4.59 NG/ML (ref 0–4)
RBC # BLD AUTO: 4.51 10*6/MM3 (ref 4.14–5.8)
SODIUM SERPL-SCNC: 141 MMOL/L (ref 136–145)
T4 FREE SERPL-MCNC: 1.46 NG/DL (ref 0.93–1.7)
TRIGL SERPL-MCNC: 134 MG/DL (ref 0–150)
TSH SERPL DL<=0.05 MIU/L-ACNC: 2.61 UIU/ML (ref 0.27–4.2)
VLDLC SERPL-MCNC: 24 MG/DL (ref 5–40)
WBC NRBC COR # BLD: 7.49 10*3/MM3 (ref 3.4–10.8)

## 2022-10-20 PROCEDURE — 99214 OFFICE O/P EST MOD 30 MIN: CPT | Performed by: FAMILY MEDICINE

## 2022-10-20 PROCEDURE — 80053 COMPREHEN METABOLIC PANEL: CPT | Performed by: FAMILY MEDICINE

## 2022-10-20 PROCEDURE — G0103 PSA SCREENING: HCPCS | Performed by: FAMILY MEDICINE

## 2022-10-20 PROCEDURE — 84443 ASSAY THYROID STIM HORMONE: CPT | Performed by: FAMILY MEDICINE

## 2022-10-20 PROCEDURE — 85025 COMPLETE CBC W/AUTO DIFF WBC: CPT | Performed by: FAMILY MEDICINE

## 2022-10-20 PROCEDURE — 80061 LIPID PANEL: CPT | Performed by: FAMILY MEDICINE

## 2022-10-20 PROCEDURE — 36415 COLL VENOUS BLD VENIPUNCTURE: CPT | Performed by: FAMILY MEDICINE

## 2022-10-20 PROCEDURE — 84439 ASSAY OF FREE THYROXINE: CPT | Performed by: FAMILY MEDICINE

## 2022-10-20 PROCEDURE — 83036 HEMOGLOBIN GLYCOSYLATED A1C: CPT | Performed by: FAMILY MEDICINE

## 2022-10-20 RX ORDER — LISINOPRIL AND HYDROCHLOROTHIAZIDE 20; 12.5 MG/1; MG/1
2 TABLET ORAL DAILY
Qty: 180 TABLET | Refills: 1 | Status: SHIPPED | OUTPATIENT
Start: 2022-10-20

## 2022-10-20 RX ORDER — CELECOXIB 200 MG/1
200 CAPSULE ORAL DAILY
Qty: 90 CAPSULE | Refills: 1 | Status: SHIPPED | OUTPATIENT
Start: 2022-10-20

## 2022-10-20 RX ORDER — CELECOXIB 200 MG/1
CAPSULE ORAL
COMMUNITY
End: 2022-10-20 | Stop reason: SDUPTHER

## 2022-10-20 NOTE — PROGRESS NOTES
..  Venipuncture Blood Specimen Collection  Venipuncture performed in left arm by Irina Juarez with good hemostasis. Patient tolerated the procedure well without complications.   10/20/22   Irina Juarez

## 2022-10-20 NOTE — PROGRESS NOTES
Chief Complaint  Hypertension (Refills )    Subjective          Arley Flak presents to [facility]  History of Present Illness  Pt out of BP meds x 1 week- pt has had 's/60's at home- pt restarting meds- will continue to monitor  Hypertension  This is a chronic problem. The current episode started more than 1 year ago. The problem has been gradually improving since onset. The problem is controlled. Pertinent negatives include no anxiety, blurred vision, chest pain, headaches, malaise/fatigue, neck pain, orthopnea, palpitations, peripheral edema, PND, shortness of breath or sweats. There are no associated agents to hypertension. Risk factors for coronary artery disease include dyslipidemia and male gender. Current antihypertension treatment includes ACE inhibitors and diuretics. The current treatment provides significant improvement. There are no compliance problems.    Hyperlipidemia  This is a chronic problem. The current episode started more than 1 year ago. The problem is controlled. Recent lipid tests were reviewed and are variable. There are no known factors aggravating his hyperlipidemia. Pertinent negatives include no chest pain, focal sensory loss, focal weakness, leg pain, myalgias or shortness of breath. Current antihyperlipidemic treatment includes diet change. The current treatment provides significant improvement of lipids. There are no compliance problems.        Objective   No Known Allergies  Immunization History   Administered Date(s) Administered   • COVID-19 (MODERNA) 1st, 2nd, 3rd Dose Only 02/25/2021, 03/25/2021, 11/02/2021   • COVID-19 (UNSPECIFIED) 11/02/2021   • Flu Vaccine Quad PF >36MO 11/15/2019   • Pneumococcal Polysaccharide (PPSV23) 03/18/2020   • Tdap 02/25/2021     History reviewed. No pertinent past medical history.   History reviewed. No pertinent surgical history.   Social History     Socioeconomic History   • Marital status:     Tobacco Use   • Smoking status: Former   • Smokeless tobacco: Former     Quit date: 10/18/2017   • Tobacco comments:     quit 1987   Vaping Use   • Vaping Use: Never used        Current Outpatient Medications:   •  celecoxib (CeleBREX) 200 MG capsule, Take 1 capsule by mouth Daily., Disp: 90 capsule, Rfl: 1  •  lisinopril-hydrochlorothiazide (PRINZIDE,ZESTORETIC) 20-12.5 MG per tablet, Take 2 tablets by mouth Daily., Disp: 180 tablet, Rfl: 1  •  allopurinol (ZYLOPRIM) 100 MG tablet, TAKE 1 TABLET BY MOUTH DAILY, Disp: 90 tablet, Rfl: 1  •  amLODIPine (NORVASC) 5 MG tablet, TAKE 1 TABLET BY MOUTH DAILY, Disp: 90 tablet, Rfl: 1  •  montelukast (SINGULAIR) 10 MG tablet, TAKE 1 TABLET BY MOUTH EVERY DAY, Disp: 90 tablet, Rfl: 1  •  Myrbetriq 50 MG tablet sustained-release 24 hour 24 hr tablet, TAKE 1 TABLET BY MOUTH EVERY 24 HOURS, Disp: , Rfl:   •  Nutritional Supplements (NUTRITIONAL SUPPLEMENT PO), Take  by mouth. Crave Supplement, Disp: , Rfl:   •  Omega-3 Fatty Acids (fish oil) 1000 MG capsule capsule, Take 2 capsules by mouth 2 (Two) Times a Day With Meals., Disp: 360 capsule, Rfl: 1  •  tamsulosin (FLOMAX) 0.4 MG capsule 24 hr capsule, TAKE 1 CAPSULE BY MOUTH DAILY, Disp: 90 capsule, Rfl: 1  •  vitamin E 600 UNIT capsule, vitamin E 600 unit oral capsule take 1 capsule by oral route daily   Active, Disp: , Rfl:    History reviewed. No pertinent family history.       Vital Signs:   Vitals:    10/20/22 0816   BP: 150/80   BP Location: Left arm   Patient Position: Sitting   Cuff Size: Large Adult   Pulse: 83   Temp: 97.8 °F (36.6 °C)   TempSrc: Infrared   SpO2: 100%   Weight: 131 kg (288 lb 12.8 oz)       Review of Systems   Constitutional: Negative for malaise/fatigue.   Eyes: Negative for blurred vision.   Respiratory: Negative for shortness of breath.    Cardiovascular: Negative for chest pain, palpitations, orthopnea and PND.   Musculoskeletal: Negative for myalgias and neck pain.   Neurological:  Negative for focal weakness and headaches.      Physical Exam  Vitals reviewed.   Constitutional:       Appearance: Normal appearance. He is well-developed.   HENT:      Head: Normocephalic and atraumatic.      Right Ear: External ear normal.      Left Ear: External ear normal.      Mouth/Throat:      Pharynx: No oropharyngeal exudate.   Eyes:      Conjunctiva/sclera: Conjunctivae normal.      Pupils: Pupils are equal, round, and reactive to light.   Cardiovascular:      Rate and Rhythm: Normal rate and regular rhythm.      Pulses: Normal pulses.      Heart sounds: Normal heart sounds. No murmur heard.    No friction rub. No gallop.   Pulmonary:      Effort: Pulmonary effort is normal.      Breath sounds: Normal breath sounds. No wheezing or rhonchi.   Abdominal:      General: Abdomen is flat. Bowel sounds are normal. There is no distension.      Palpations: Abdomen is soft. There is no mass.      Tenderness: There is no abdominal tenderness. There is no guarding or rebound.      Hernia: No hernia is present.   Musculoskeletal:         General: Normal range of motion.   Skin:     General: Skin is warm and dry.      Capillary Refill: Capillary refill takes less than 2 seconds.   Neurological:      General: No focal deficit present.      Mental Status: He is alert and oriented to person, place, and time.      Cranial Nerves: No cranial nerve deficit.   Psychiatric:         Mood and Affect: Mood and affect normal.         Behavior: Behavior normal.         Thought Content: Thought content normal.         Judgment: Judgment normal.        Result Review :   The following data was reviewed by: Alfred Church MD on 10/20/2022:  CMP    CMP 3/22/22   Glucose 100 (A)   BUN 15   Creatinine 0.95   Sodium 136   Potassium 4.9   Chloride 97 (A)   Calcium 10.0   Albumin 5.10   Total Bilirubin 0.7   Alkaline Phosphatase 70   AST (SGOT) 18   ALT (SGPT) 25   (A) Abnormal value            CBC    CBC 3/22/22   WBC 9.24   RBC 4.83    Hemoglobin 15.5   Hematocrit 45.9   MCV 95.0   MCH 32.1   MCHC 33.8   RDW 12.6   Platelets 190           Lipid Panel    Lipid Panel 3/22/22   Total Cholesterol 171   Triglycerides 128   HDL Cholesterol 44   VLDL Cholesterol 23   LDL Cholesterol  104 (A)   LDL/HDL Ratio 2.30   (A) Abnormal value            TSH    TSH 3/22/22   TSH 2.370           Most Recent A1C    HGBA1C Most Recent 3/22/22   Hemoglobin A1C 5.90 (A)   (A) Abnormal value                          Assessment and Plan    Diagnoses and all orders for this visit:    1. Arthritis (Primary)  -     celecoxib (CeleBREX) 200 MG capsule; Take 1 capsule by mouth Daily.  Dispense: 90 capsule; Refill: 1    2. Primary hypertension  -     lisinopril-hydrochlorothiazide (PRINZIDE,ZESTORETIC) 20-12.5 MG per tablet; Take 2 tablets by mouth Daily.  Dispense: 180 tablet; Refill: 1  -     CBC Auto Differential  -     Comprehensive Metabolic Panel  -     Lipid Panel  -     TSH+Free T4    3. Mixed hyperlipidemia    4. Prediabetes  -     Hemoglobin A1c    5. Screening PSA (prostate specific antigen)  -     PSA Screen            Follow Up   Return in about 6 months (around 4/20/2023) for Recheck.  Patient was given instructions and counseling regarding his condition or for health maintenance advice. Please see specific information pulled into the AVS if appropriate.

## 2022-10-21 NOTE — PROGRESS NOTES
Call pt:  Labs showed no significant changes except PSA is still elevated.  Please follow-up with urology for this.  Let me know if you are not currently still seeing urology and I will refer to urology.

## 2022-11-07 DIAGNOSIS — N40.0 BENIGN PROSTATIC HYPERPLASIA WITHOUT LOWER URINARY TRACT SYMPTOMS: ICD-10-CM

## 2022-11-07 RX ORDER — TAMSULOSIN HYDROCHLORIDE 0.4 MG/1
0.4 CAPSULE ORAL DAILY
Qty: 90 CAPSULE | Refills: 0 | Status: SHIPPED | OUTPATIENT
Start: 2022-11-07 | End: 2023-01-17 | Stop reason: SDUPTHER

## 2022-11-11 DIAGNOSIS — M1A.9XX0 CHRONIC GOUT WITHOUT TOPHUS, UNSPECIFIED CAUSE, UNSPECIFIED SITE: ICD-10-CM

## 2022-11-11 DIAGNOSIS — I10 PRIMARY HYPERTENSION: ICD-10-CM

## 2022-11-11 RX ORDER — AMLODIPINE BESYLATE 5 MG/1
5 TABLET ORAL DAILY
Qty: 90 TABLET | Refills: 1 | Status: SHIPPED | OUTPATIENT
Start: 2022-11-11

## 2022-11-11 RX ORDER — ALLOPURINOL 100 MG/1
100 TABLET ORAL DAILY
Qty: 90 TABLET | Refills: 1 | Status: SHIPPED | OUTPATIENT
Start: 2022-11-11

## 2022-11-11 NOTE — TELEPHONE ENCOUNTER
Caller: JOSE VAUGHNE    Relationship: Emergency Contact    Best call back number: [phone]    Requested Prescriptions:   Requested Prescriptions     Pending Prescriptions Disp Refills   • amLODIPine (NORVASC) 5 MG tablet 90 tablet 1     Sig: Take 1 tablet by mouth Daily.   • allopurinol (ZYLOPRIM) 100 MG tablet 90 tablet 1     Sig: Take 1 tablet by mouth Daily.        Pharmacy where request should be sent: [address] [phone] [facility] [phone] [facility]     Additional details provided by patient: PATIENT IS OUT OF BOTH MEDICATIONS    Does the patient have less than a 3 day supply:  [x] Yes  [] No    Dislhad Guerrero Rep   11/11/22 13:22 EST

## 2023-01-17 ENCOUNTER — TELEPHONE (OUTPATIENT)
Dept: FAMILY MEDICINE CLINIC | Facility: CLINIC | Age: 73
End: 2023-01-17
Payer: MEDICARE

## 2023-01-17 ENCOUNTER — DOCUMENTATION (OUTPATIENT)
Dept: FAMILY MEDICINE CLINIC | Facility: CLINIC | Age: 73
End: 2023-01-17
Payer: MEDICARE

## 2023-01-17 DIAGNOSIS — N40.0 BENIGN PROSTATIC HYPERPLASIA WITHOUT LOWER URINARY TRACT SYMPTOMS: ICD-10-CM

## 2023-01-17 RX ORDER — TAMSULOSIN HYDROCHLORIDE 0.4 MG/1
0.4 CAPSULE ORAL DAILY
Qty: 90 CAPSULE | Refills: 0 | Status: SHIPPED | OUTPATIENT
Start: 2023-01-17

## 2023-05-02 ENCOUNTER — OFFICE VISIT (OUTPATIENT)
Dept: FAMILY MEDICINE CLINIC | Facility: CLINIC | Age: 73
End: 2023-05-02
Payer: MEDICARE

## 2023-05-02 VITALS
BODY MASS INDEX: 41.97 KG/M2 | DIASTOLIC BLOOD PRESSURE: 80 MMHG | HEART RATE: 80 BPM | HEIGHT: 70 IN | TEMPERATURE: 97.3 F | OXYGEN SATURATION: 98 % | WEIGHT: 293.2 LBS | SYSTOLIC BLOOD PRESSURE: 120 MMHG

## 2023-05-02 DIAGNOSIS — Z00.00 MEDICARE ANNUAL WELLNESS VISIT, SUBSEQUENT: Primary | ICD-10-CM

## 2023-05-02 DIAGNOSIS — J30.89 NON-SEASONAL ALLERGIC RHINITIS, UNSPECIFIED TRIGGER: ICD-10-CM

## 2023-05-02 DIAGNOSIS — N40.0 BENIGN PROSTATIC HYPERPLASIA WITHOUT LOWER URINARY TRACT SYMPTOMS: ICD-10-CM

## 2023-05-02 DIAGNOSIS — R73.03 PREDIABETES: ICD-10-CM

## 2023-05-02 DIAGNOSIS — Z23 NEED FOR PNEUMOCOCCAL VACCINATION: ICD-10-CM

## 2023-05-02 DIAGNOSIS — M19.90 ARTHRITIS: ICD-10-CM

## 2023-05-02 DIAGNOSIS — I10 PRIMARY HYPERTENSION: ICD-10-CM

## 2023-05-02 DIAGNOSIS — M1A.9XX0 CHRONIC GOUT WITHOUT TOPHUS, UNSPECIFIED CAUSE, UNSPECIFIED SITE: ICD-10-CM

## 2023-05-02 LAB
ALBUMIN SERPL-MCNC: 4.8 G/DL (ref 3.5–5.2)
ALBUMIN/GLOB SERPL: 2 G/DL
ALP SERPL-CCNC: 67 U/L (ref 39–117)
ALT SERPL W P-5'-P-CCNC: 31 U/L (ref 1–41)
ANION GAP SERPL CALCULATED.3IONS-SCNC: 12.1 MMOL/L (ref 5–15)
AST SERPL-CCNC: 19 U/L (ref 1–40)
BASOPHILS # BLD AUTO: 0.03 10*3/MM3 (ref 0–0.2)
BASOPHILS NFR BLD AUTO: 0.5 % (ref 0–1.5)
BILIRUB SERPL-MCNC: 0.6 MG/DL (ref 0–1.2)
BUN SERPL-MCNC: 10 MG/DL (ref 8–23)
BUN/CREAT SERPL: 9.9 (ref 7–25)
CALCIUM SPEC-SCNC: 10.1 MG/DL (ref 8.6–10.5)
CHLORIDE SERPL-SCNC: 101 MMOL/L (ref 98–107)
CHOLEST SERPL-MCNC: 180 MG/DL (ref 0–200)
CO2 SERPL-SCNC: 27.9 MMOL/L (ref 22–29)
CREAT SERPL-MCNC: 1.01 MG/DL (ref 0.76–1.27)
DEPRECATED RDW RBC AUTO: 41.6 FL (ref 37–54)
EGFRCR SERPLBLD CKD-EPI 2021: 78 ML/MIN/1.73
EOSINOPHIL # BLD AUTO: 0.23 10*3/MM3 (ref 0–0.4)
EOSINOPHIL NFR BLD AUTO: 3.6 % (ref 0.3–6.2)
ERYTHROCYTE [DISTWIDTH] IN BLOOD BY AUTOMATED COUNT: 12.6 % (ref 12.3–15.4)
GLOBULIN UR ELPH-MCNC: 2.4 GM/DL
GLUCOSE SERPL-MCNC: 113 MG/DL (ref 65–99)
HBA1C MFR BLD: 6.1 % (ref 4.8–5.6)
HCT VFR BLD AUTO: 44.5 % (ref 37.5–51)
HDLC SERPL-MCNC: 40 MG/DL (ref 40–60)
HGB BLD-MCNC: 15.7 G/DL (ref 13–17.7)
IMM GRANULOCYTES # BLD AUTO: 0.02 10*3/MM3 (ref 0–0.05)
IMM GRANULOCYTES NFR BLD AUTO: 0.3 % (ref 0–0.5)
LDLC SERPL CALC-MCNC: 114 MG/DL (ref 0–100)
LDLC/HDLC SERPL: 2.77 {RATIO}
LYMPHOCYTES # BLD AUTO: 1.48 10*3/MM3 (ref 0.7–3.1)
LYMPHOCYTES NFR BLD AUTO: 23.1 % (ref 19.6–45.3)
MCH RBC QN AUTO: 32.2 PG (ref 26.6–33)
MCHC RBC AUTO-ENTMCNC: 35.3 G/DL (ref 31.5–35.7)
MCV RBC AUTO: 91.2 FL (ref 79–97)
MONOCYTES # BLD AUTO: 0.53 10*3/MM3 (ref 0.1–0.9)
MONOCYTES NFR BLD AUTO: 8.3 % (ref 5–12)
NEUTROPHILS NFR BLD AUTO: 4.12 10*3/MM3 (ref 1.7–7)
NEUTROPHILS NFR BLD AUTO: 64.2 % (ref 42.7–76)
NRBC BLD AUTO-RTO: 0 /100 WBC (ref 0–0.2)
PLATELET # BLD AUTO: 182 10*3/MM3 (ref 140–450)
PMV BLD AUTO: 10.7 FL (ref 6–12)
POTASSIUM SERPL-SCNC: 4.2 MMOL/L (ref 3.5–5.2)
PROT SERPL-MCNC: 7.2 G/DL (ref 6–8.5)
RBC # BLD AUTO: 4.88 10*6/MM3 (ref 4.14–5.8)
SODIUM SERPL-SCNC: 141 MMOL/L (ref 136–145)
T4 FREE SERPL-MCNC: 1.36 NG/DL (ref 0.93–1.7)
TRIGL SERPL-MCNC: 146 MG/DL (ref 0–150)
TSH SERPL DL<=0.05 MIU/L-ACNC: 2.26 UIU/ML (ref 0.27–4.2)
URATE SERPL-MCNC: 6.7 MG/DL (ref 3.4–7)
VLDLC SERPL-MCNC: 26 MG/DL (ref 5–40)
WBC NRBC COR # BLD: 6.41 10*3/MM3 (ref 3.4–10.8)

## 2023-05-02 PROCEDURE — 84550 ASSAY OF BLOOD/URIC ACID: CPT | Performed by: FAMILY MEDICINE

## 2023-05-02 PROCEDURE — 80053 COMPREHEN METABOLIC PANEL: CPT | Performed by: FAMILY MEDICINE

## 2023-05-02 PROCEDURE — 84443 ASSAY THYROID STIM HORMONE: CPT | Performed by: FAMILY MEDICINE

## 2023-05-02 PROCEDURE — 80061 LIPID PANEL: CPT | Performed by: FAMILY MEDICINE

## 2023-05-02 PROCEDURE — 83036 HEMOGLOBIN GLYCOSYLATED A1C: CPT | Performed by: FAMILY MEDICINE

## 2023-05-02 PROCEDURE — 85025 COMPLETE CBC W/AUTO DIFF WBC: CPT | Performed by: FAMILY MEDICINE

## 2023-05-02 PROCEDURE — 84439 ASSAY OF FREE THYROXINE: CPT | Performed by: FAMILY MEDICINE

## 2023-05-02 RX ORDER — AMLODIPINE BESYLATE 5 MG/1
5 TABLET ORAL DAILY
Qty: 90 TABLET | Refills: 1 | Status: SHIPPED | OUTPATIENT
Start: 2023-05-02

## 2023-05-02 RX ORDER — LISINOPRIL AND HYDROCHLOROTHIAZIDE 20; 12.5 MG/1; MG/1
2 TABLET ORAL DAILY
Qty: 180 TABLET | Refills: 1 | Status: SHIPPED | OUTPATIENT
Start: 2023-05-02

## 2023-05-02 RX ORDER — ALLOPURINOL 100 MG/1
100 TABLET ORAL DAILY
Qty: 90 TABLET | Refills: 1 | Status: SHIPPED | OUTPATIENT
Start: 2023-05-02

## 2023-05-02 RX ORDER — TAMSULOSIN HYDROCHLORIDE 0.4 MG/1
0.4 CAPSULE ORAL DAILY
Qty: 90 CAPSULE | Refills: 1 | Status: SHIPPED | OUTPATIENT
Start: 2023-05-02

## 2023-05-02 RX ORDER — CELECOXIB 200 MG/1
200 CAPSULE ORAL DAILY
Qty: 90 CAPSULE | Refills: 1 | Status: SHIPPED | OUTPATIENT
Start: 2023-05-02

## 2023-05-02 RX ORDER — MONTELUKAST SODIUM 10 MG/1
10 TABLET ORAL DAILY
Qty: 90 TABLET | Refills: 1 | Status: SHIPPED | OUTPATIENT
Start: 2023-05-02

## 2023-05-02 NOTE — PROGRESS NOTES
Venipuncture Blood Specimen Collection  Venipuncture performed in left arm by Sarah Golden with good hemostasis. Patient tolerated the procedure well without complications.   05/02/23   Sarah Golden

## 2023-05-02 NOTE — PROGRESS NOTES
The ABCs of the Annual Wellness Visit  Subsequent Medicare Wellness Visit    Subjective    Arley Falk is a 72 y.o. male who presents for a Subsequent Medicare Wellness Visit.    The following portions of the patient's history were reviewed and   updated as appropriate:   He  has no past medical history on file.  He does not have any pertinent problems on file.  He  has no past surgical history on file.  His family history is not on file.  He  reports that he quit smoking about 5 years ago. His smoking use included cigarettes. He has a 50.00 pack-year smoking history. He has never been exposed to tobacco smoke. He quit smokeless tobacco use about 5 years ago. He reports current alcohol use of about 2.0 standard drinks per week. He reports that he does not use drugs.  Current Outpatient Medications   Medication Sig Dispense Refill   • allopurinol (ZYLOPRIM) 100 MG tablet Take 1 tablet by mouth Daily. 90 tablet 1   • amLODIPine (NORVASC) 5 MG tablet Take 1 tablet by mouth Daily. 90 tablet 1   • celecoxib (CeleBREX) 200 MG capsule Take 1 capsule by mouth Daily. 90 capsule 1   • lisinopril-hydrochlorothiazide (PRINZIDE,ZESTORETIC) 20-12.5 MG per tablet Take 2 tablets by mouth Daily. 180 tablet 1   • montelukast (SINGULAIR) 10 MG tablet Take 1 tablet by mouth Daily. 90 tablet 1   • Nutritional Supplements (NUTRITIONAL SUPPLEMENT PO) Take  by mouth. Crave Supplement     • Omega-3 Fatty Acids (fish oil) 1000 MG capsule capsule Take 2 capsules by mouth 2 (Two) Times a Day With Meals. 360 capsule 1   • tamsulosin (FLOMAX) 0.4 MG capsule 24 hr capsule Take 1 capsule by mouth Daily. 90 capsule 1   • vitamin E 600 UNIT capsule vitamin E 600 unit oral capsule take 1 capsule by oral route daily   Active       No current facility-administered medications for this visit.     Current Outpatient Medications on File Prior to Visit   Medication Sig   • Nutritional Supplements (NUTRITIONAL SUPPLEMENT PO) Take  by mouth. Minnieve  Supplement   • Omega-3 Fatty Acids (fish oil) 1000 MG capsule capsule Take 2 capsules by mouth 2 (Two) Times a Day With Meals.   • vitamin E 600 UNIT capsule vitamin E 600 unit oral capsule take 1 capsule by oral route daily   Active   • [DISCONTINUED] allopurinol (ZYLOPRIM) 100 MG tablet Take 1 tablet by mouth Daily.   • [DISCONTINUED] amLODIPine (NORVASC) 5 MG tablet Take 1 tablet by mouth Daily.   • [DISCONTINUED] celecoxib (CeleBREX) 200 MG capsule Take 1 capsule by mouth Daily.   • [DISCONTINUED] lisinopril-hydrochlorothiazide (PRINZIDE,ZESTORETIC) 20-12.5 MG per tablet Take 2 tablets by mouth Daily.   • [DISCONTINUED] montelukast (SINGULAIR) 10 MG tablet TAKE 1 TABLET BY MOUTH EVERY DAY   • [DISCONTINUED] Myrbetriq 50 MG tablet sustained-release 24 hour 24 hr tablet TAKE 1 TABLET BY MOUTH EVERY 24 HOURS   • [DISCONTINUED] tamsulosin (FLOMAX) 0.4 MG capsule 24 hr capsule Take 1 capsule by mouth Daily.     No current facility-administered medications on file prior to visit.     He has No Known Allergies..    Compared to one year ago, the patient feels his physical   health is the same.    Compared to one year ago, the patient feels his mental   health is the same.    Recent Hospitalizations:  He was not admitted to the hospital during the last year.       Current Medical Providers:  Patient Care Team:  Alfred Church MD as PCP - General (Family Medicine)    Outpatient Medications Prior to Visit   Medication Sig Dispense Refill   • Nutritional Supplements (NUTRITIONAL SUPPLEMENT PO) Take  by mouth. Crave Supplement     • Omega-3 Fatty Acids (fish oil) 1000 MG capsule capsule Take 2 capsules by mouth 2 (Two) Times a Day With Meals. 360 capsule 1   • vitamin E 600 UNIT capsule vitamin E 600 unit oral capsule take 1 capsule by oral route daily   Active     • allopurinol (ZYLOPRIM) 100 MG tablet Take 1 tablet by mouth Daily. 90 tablet 1   • amLODIPine (NORVASC) 5 MG tablet Take 1 tablet by mouth Daily. 90  "tablet 1   • celecoxib (CeleBREX) 200 MG capsule Take 1 capsule by mouth Daily. 90 capsule 1   • lisinopril-hydrochlorothiazide (PRINZIDE,ZESTORETIC) 20-12.5 MG per tablet Take 2 tablets by mouth Daily. 180 tablet 1   • montelukast (SINGULAIR) 10 MG tablet TAKE 1 TABLET BY MOUTH EVERY DAY 90 tablet 1   • Myrbetriq 50 MG tablet sustained-release 24 hour 24 hr tablet TAKE 1 TABLET BY MOUTH EVERY 24 HOURS     • tamsulosin (FLOMAX) 0.4 MG capsule 24 hr capsule Take 1 capsule by mouth Daily. 90 capsule 0     No facility-administered medications prior to visit.       No opioid medication identified on active medication list. I have reviewed chart for other potential  high risk medication/s and harmful drug interactions in the elderly.          Aspirin is not on active medication list.  Aspirin use is not indicated based on review of current medical condition/s. Risk of harm outweighs potential benefits.  .    Patient Active Problem List   Diagnosis   • Enlarged prostate   • Gout   • Hypercholesterolemia   • Primary hypertension   • Non-seasonal allergic rhinitis   • Sleep apnea     Advance Care Planning   Advance Care Planning     Advance Directive is not on file.  ACP discussion was held with the patient during this visit. Patient does not have an advance directive, information provided.     Objective    Vitals:    05/02/23 1115   BP: 120/80   Pulse: 80   Temp: 97.3 °F (36.3 °C)   SpO2: 98%   Weight: 133 kg (293 lb 3.2 oz)   Height: 177.8 cm (70\")     Estimated body mass index is 42.07 kg/m² as calculated from the following:    Height as of this encounter: 177.8 cm (70\").    Weight as of this encounter: 133 kg (293 lb 3.2 oz).    Class 3 Severe Obesity (BMI >=40). Obesity-related health conditions include the following: hypertension and dyslipidemias. Obesity is improving with lifestyle modifications. BMI is is above average; no BMI management plan is appropriate. We discussed portion control and increasing " exercise.      Does the patient have evidence of cognitive impairment? No          HEALTH RISK ASSESSMENT    Smoking Status:  Social History     Tobacco Use   Smoking Status Former   • Packs/day: 1.00   • Years: 50.00   • Pack years: 50.00   • Types: Cigarettes   • Quit date: 10/18/2017   • Years since quittin.5   • Passive exposure: Never   Smokeless Tobacco Former   • Quit date: 10/18/2017   Tobacco Comments    quit      Alcohol Consumption:  Social History     Substance and Sexual Activity   Alcohol Use Yes   • Alcohol/week: 2.0 standard drinks   • Types: 2 Cans of beer per week     Fall Risk Screen:    GAIL Fall Risk Assessment was completed, and patient is at MODERATE risk for falls. Assessment completed on:2023    Depression Screenin/2/2023    11:18 AM   PHQ-2/PHQ-9 Depression Screening   Little Interest or Pleasure in Doing Things 0-->not at all   Feeling Down, Depressed or Hopeless 0-->not at all   PHQ-9: Brief Depression Severity Measure Score 0       Health Habits and Functional and Cognitive Screenin/2/2023    11:00 AM   Functional & Cognitive Status   Do you have difficulty preparing food and eating? No   Do you have difficulty bathing yourself, getting dressed or grooming yourself? No   Do you have difficulty using the toilet? No   Do you have difficulty moving around from place to place? No   Do you have trouble with steps or getting out of a bed or a chair? No   Current Diet Frequent Junk Food   Dental Exam Up to date   Eye Exam Up to date   Exercise (times per week) 0 times per week   Current Exercises Include No Regular Exercise   Do you need help using the phone?  No   Are you deaf or do you have serious difficulty hearing?  No   Do you need help with transportation? No   Do you need help shopping? No   Do you need help preparing meals?  No   Do you need help with housework?  No   Do you need help with laundry? No   Do you need help taking your medications? No   Do  you need help managing money? No   Do you ever drive or ride in a car without wearing a seat belt? No   Have you felt unusual stress, anger or loneliness in the last month? No   Who do you live with? Spouse   If you need help, do you have trouble finding someone available to you? No   Have you been bothered in the last four weeks by sexual problems? No   Do you have difficulty concentrating, remembering or making decisions? No       Age-appropriate Screening Schedule:  Refer to the list below for future screening recommendations based on patient's age, sex and/or medical conditions. Orders for these recommended tests are listed in the plan section. The patient has been provided with a written plan.    Health Maintenance   Topic Date Due   • ZOSTER VACCINE (1 of 2) Never done   • Pneumococcal Vaccine 65+ (2 - PCV) 03/18/2021   • COLORECTAL CANCER SCREENING  06/30/2023   • INFLUENZA VACCINE  08/01/2023   • LIPID PANEL  10/20/2023   • ANNUAL WELLNESS VISIT  05/02/2024   • TDAP/TD VACCINES (2 - Td or Tdap) 02/25/2031   • HEPATITIS C SCREENING  Completed   • COVID-19 Vaccine  Completed   • AAA SCREEN (ONE-TIME)  Completed                  CMS Preventative Services Quick Reference  Risk Factors Identified During Encounter  Immunizations Discussed/Encouraged: Prevnar 20 (Pneumococcal 20-valent conjugate) and Shingrix  Inactivity/Sedentary: Patient was advised to exercise at least 150 minutes a week per CDC recommendations.  The above risks/problems have been discussed with the patient.  Pertinent information has been shared with the patient in the After Visit Summary.  An After Visit Summary and PPPS were made available to the patient.    Follow Up:   Next Medicare Wellness visit to be scheduled in 1 year.       Additional E&M Note during same encounter follows:  Patient has multiple medical problems which are significant and separately identifiable that require additional work above and beyond the Medicare Wellness Visit.       Chief Complaint  Medicare Wellness-subsequent and Hyperlipidemia (Refills and check up)    Subjective        Gout controlled on med- pt tolerating meds well    Hyperlipidemia  This is a chronic problem. The current episode started more than 1 year ago. The problem is controlled. Recent lipid tests were reviewed and are variable. There are no known factors aggravating his hyperlipidemia. Pertinent negatives include no chest pain, focal sensory loss, focal weakness, leg pain, myalgias or shortness of breath. Current antihyperlipidemic treatment includes statins. The current treatment provides significant improvement of lipids. There are no compliance problems.  Risk factors for coronary artery disease include dyslipidemia, hypertension, male sex and family history.   Hypertension  This is a chronic problem. The current episode started more than 1 year ago. The problem has been gradually improving since onset. The problem is controlled. Pertinent negatives include no anxiety, blurred vision, chest pain, headaches, malaise/fatigue, neck pain, orthopnea, palpitations, peripheral edema, PND, shortness of breath or sweats. There are no associated agents to hypertension. Current antihypertension treatment includes ACE inhibitors, calcium channel blockers and diuretics. The current treatment provides significant improvement. There are no compliance problems.      Arley Falk is also being seen today for HTN, hyperlipidemia, gout    Review of Systems   Constitutional: Negative for fatigue, fever and malaise/fatigue.   HENT: Negative for sore throat.    Eyes: Negative for blurred vision and visual disturbance.   Respiratory: Negative for cough, chest tightness, shortness of breath and wheezing.    Cardiovascular: Negative for chest pain, palpitations, orthopnea, leg swelling and PND.   Gastrointestinal: Negative for abdominal pain, diarrhea, nausea and vomiting.   Musculoskeletal: Negative for myalgias and neck  "pain.   Skin: Negative for rash.   Neurological: Negative for focal weakness and light-headedness.   Psychiatric/Behavioral: Negative for decreased concentration, dysphoric mood, sleep disturbance and suicidal ideas. The patient is not nervous/anxious.        Objective   Vital Signs:  /80   Pulse 80   Temp 97.3 °F (36.3 °C)   Ht 177.8 cm (70\")   Wt 133 kg (293 lb 3.2 oz)   SpO2 98%   BMI 42.07 kg/m²     Physical Exam  Vitals reviewed.   Constitutional:       Appearance: Normal appearance. He is well-developed.   HENT:      Head: Normocephalic and atraumatic.      Right Ear: External ear normal.      Left Ear: External ear normal.      Mouth/Throat:      Pharynx: No oropharyngeal exudate.   Eyes:      Conjunctiva/sclera: Conjunctivae normal.      Pupils: Pupils are equal, round, and reactive to light.   Cardiovascular:      Rate and Rhythm: Normal rate and regular rhythm.      Pulses: Normal pulses.      Heart sounds: Normal heart sounds. No murmur heard.    No friction rub. No gallop.   Pulmonary:      Effort: Pulmonary effort is normal.      Breath sounds: Normal breath sounds. No wheezing or rhonchi.   Abdominal:      General: Abdomen is flat. Bowel sounds are normal. There is no distension.      Palpations: Abdomen is soft. There is no mass.      Tenderness: There is no abdominal tenderness. There is no guarding or rebound.      Hernia: No hernia is present.   Musculoskeletal:         General: Normal range of motion.   Skin:     General: Skin is warm and dry.      Capillary Refill: Capillary refill takes less than 2 seconds.   Neurological:      General: No focal deficit present.      Mental Status: He is alert and oriented to person, place, and time.      Cranial Nerves: No cranial nerve deficit.   Psychiatric:         Mood and Affect: Mood and affect normal.         Behavior: Behavior normal.         Thought Content: Thought content normal.         Judgment: Judgment normal.          The following " data was reviewed by: Alfred Church MD on 05/02/2023:  CMP        10/20/2022    09:05   CMP   Glucose 111     BUN 8     Creatinine 0.94     EGFR 85.1     Sodium 141     Potassium 4.7     Chloride 102     Calcium 9.7     Total Protein 7.3     Albumin 4.70     Globulin 2.6     Total Bilirubin 0.5     Alkaline Phosphatase 65     AST (SGOT) 22     ALT (SGPT) 22     Albumin/Globulin Ratio 1.8     BUN/Creatinine Ratio 8.5     Anion Gap 12.0       CBC        10/20/2022    09:05   CBC   WBC 7.49     RBC 4.51     Hemoglobin 14.8     Hematocrit 40.4     MCV 89.6     MCH 32.8     MCHC 36.6     RDW 12.3     Platelets 176       Lipid Panel        10/20/2022    09:05   Lipid Panel   Total Cholesterol 178     Triglycerides 134     HDL Cholesterol 45     VLDL Cholesterol 24     LDL Cholesterol  109     LDL/HDL Ratio 2.36       TSH        10/20/2022    09:05   TSH   TSH 2.610       Most Recent A1C        10/20/2022    09:05   HGBA1C Most Recent   Hemoglobin A1C 5.70       PSA        10/20/2022    09:05   PSA   PSA 4.590                  Assessment and Plan   Diagnoses and all orders for this visit:    1. Medicare annual wellness visit, subsequent (Primary)    2. Chronic gout without tophus, unspecified cause, unspecified site  -     allopurinol (ZYLOPRIM) 100 MG tablet; Take 1 tablet by mouth Daily.  Dispense: 90 tablet; Refill: 1  -     Uric Acid    3. Primary hypertension  -     amLODIPine (NORVASC) 5 MG tablet; Take 1 tablet by mouth Daily.  Dispense: 90 tablet; Refill: 1  -     lisinopril-hydrochlorothiazide (PRINZIDE,ZESTORETIC) 20-12.5 MG per tablet; Take 2 tablets by mouth Daily.  Dispense: 180 tablet; Refill: 1  -     CBC Auto Differential  -     Comprehensive Metabolic Panel  -     Lipid Panel  -     TSH+Free T4    4. Arthritis  -     celecoxib (CeleBREX) 200 MG capsule; Take 1 capsule by mouth Daily.  Dispense: 90 capsule; Refill: 1    5. Non-seasonal allergic rhinitis, unspecified trigger  -     montelukast (SINGULAIR)  10 MG tablet; Take 1 tablet by mouth Daily.  Dispense: 90 tablet; Refill: 1    6. Benign prostatic hyperplasia without lower urinary tract symptoms  -     tamsulosin (FLOMAX) 0.4 MG capsule 24 hr capsule; Take 1 capsule by mouth Daily.  Dispense: 90 capsule; Refill: 1    7. Prediabetes  -     Hemoglobin A1c    8. Need for pneumococcal vaccination  -     Pneumococcal Conjugate Vaccine 20-Valent (PCV20)             Follow Up   Return in about 6 months (around 11/2/2023) for Recheck.  Patient was given instructions and counseling regarding his condition or for health maintenance advice. Please see specific information pulled into the AVS if appropriate.

## 2023-05-05 NOTE — PROGRESS NOTES
Labs show no significant changes.  Really watch diet and exercise.  Follow-up if you have any questions.

## 2023-09-18 ENCOUNTER — OFFICE VISIT (OUTPATIENT)
Dept: FAMILY MEDICINE CLINIC | Facility: CLINIC | Age: 73
End: 2023-09-18
Payer: MEDICARE

## 2023-09-18 VITALS
DIASTOLIC BLOOD PRESSURE: 72 MMHG | HEIGHT: 70 IN | WEIGHT: 275 LBS | HEART RATE: 94 BPM | SYSTOLIC BLOOD PRESSURE: 128 MMHG | BODY MASS INDEX: 39.37 KG/M2 | TEMPERATURE: 97.1 F | OXYGEN SATURATION: 100 %

## 2023-09-18 DIAGNOSIS — N50.89 SWELLING OF LEFT TESTICLE: ICD-10-CM

## 2023-09-18 DIAGNOSIS — R30.0 DYSURIA: Primary | ICD-10-CM

## 2023-09-18 PROBLEM — M25.561 ARTHRALGIA OF BOTH KNEES: Status: ACTIVE | Noted: 2022-03-01

## 2023-09-18 PROBLEM — M79.89 LIMB SWELLING: Status: ACTIVE | Noted: 2023-09-18

## 2023-09-18 PROBLEM — N42.9 PROSTATE DISORDER: Status: ACTIVE | Noted: 2023-09-18

## 2023-09-18 PROBLEM — M70.50 PES ANSERINUS BURSITIS: Status: ACTIVE | Noted: 2018-09-06

## 2023-09-18 PROBLEM — M25.562 ARTHRALGIA OF BOTH KNEES: Status: ACTIVE | Noted: 2022-03-01

## 2023-09-18 LAB
BILIRUB BLD-MCNC: NEGATIVE MG/DL
CLARITY, POC: ABNORMAL
COLOR UR: ABNORMAL
EXPIRATION DATE: ABNORMAL
GLUCOSE UR STRIP-MCNC: NEGATIVE MG/DL
KETONES UR QL: ABNORMAL
LEUKOCYTE EST, POC: ABNORMAL
Lab: ABNORMAL
NITRITE UR-MCNC: NEGATIVE MG/ML
PH UR: 6 [PH] (ref 5–8)
PROT UR STRIP-MCNC: ABNORMAL MG/DL
RBC # UR STRIP: ABNORMAL /UL
SP GR UR: 1.01 (ref 1–1.03)
UROBILINOGEN UR QL: ABNORMAL

## 2023-09-18 PROCEDURE — 87186 SC STD MICRODIL/AGAR DIL: CPT | Performed by: FAMILY MEDICINE

## 2023-09-18 PROCEDURE — 99213 OFFICE O/P EST LOW 20 MIN: CPT | Performed by: FAMILY MEDICINE

## 2023-09-18 PROCEDURE — 81003 URINALYSIS AUTO W/O SCOPE: CPT | Performed by: FAMILY MEDICINE

## 2023-09-18 PROCEDURE — 3078F DIAST BP <80 MM HG: CPT | Performed by: FAMILY MEDICINE

## 2023-09-18 PROCEDURE — 87086 URINE CULTURE/COLONY COUNT: CPT | Performed by: FAMILY MEDICINE

## 2023-09-18 PROCEDURE — 87077 CULTURE AEROBIC IDENTIFY: CPT | Performed by: FAMILY MEDICINE

## 2023-09-18 PROCEDURE — 3074F SYST BP LT 130 MM HG: CPT | Performed by: FAMILY MEDICINE

## 2023-09-18 RX ORDER — FUROSEMIDE 20 MG/1
1 TABLET ORAL DAILY
COMMUNITY
Start: 2023-08-21

## 2023-09-18 NOTE — PROGRESS NOTES
"Chief Complaint    Dysuria    Subjective      Arley Falk presents to [facility]    History of Present Illness    1.) DYSURIA : Pt presents after a recent hospital (outside of [facility]) admission secondary to a UTI. Patient reports a 7 day stay. He also reports testicular edema during this time. He presents today reporting that he has been experiencing abdominal discomfort. He notes urinary frequency, but denies discomfort with urination. No fevers or chills. No flank pain. Patient is also reporting new onset edema of his left testicle - no complaint of pain.    Objective     Vital Signs:     /72 (BP Location: Left arm, Patient Position: Sitting, Cuff Size: Adult)   Pulse 94   Temp 97.1 °F (36.2 °C) (Temporal)   Ht 177.8 cm (70\")   Wt 125 kg (275 lb)   SpO2 100%   BMI 39.46 kg/m²       Physical Exam  Vitals reviewed.   Constitutional:       General: He is not in acute distress.     Appearance: Normal appearance. He is well-developed.   HENT:      Head: Normocephalic and atraumatic.      Right Ear: Hearing and external ear normal.      Left Ear: Hearing and external ear normal.      Nose: Nose normal.   Eyes:      General: Lids are normal.         Right eye: No discharge.         Left eye: No discharge.      Conjunctiva/sclera: Conjunctivae normal.   Pulmonary:      Effort: Pulmonary effort is normal.   Abdominal:      General: There is no distension.   Musculoskeletal:         General: No swelling.      Cervical back: Neck supple.   Skin:     Coloration: Skin is not jaundiced.      Findings: No erythema.   Neurological:      Mental Status: He is alert. Mental status is at baseline.   Psychiatric:         Mood and Affect: Mood and affect normal.         Thought Content: Thought content normal.     Assessment and Plan     Diagnoses and all orders for this visit:    1. Dysuria (Primary)  Comments:  Urine not suggestive of UTI. Will send for culture. Advised " against taking abx until culture is resulted.  Orders:  -     POCT urinalysis dipstick, automated  -     Urine Culture - Urine, Urine, Clean Catch; Future  -     Urine Culture - Urine, Urine, Clean Catch  -     Urine Culture - Urine, Urine, Clean Catch    2. Swelling of left testicle  Comments:  Study ordered as noted. Additional recs per review.  Orders:  -     US Scrotum & Testicles; Future    Follow Up : TBD per review of culture and ultrasound.    Patient was given instructions and counseling regarding his condition or for health maintenance advice. Please see specific information pulled into the AVS if appropriate.

## 2023-09-19 ENCOUNTER — TELEPHONE (OUTPATIENT)
Dept: FAMILY MEDICINE CLINIC | Facility: CLINIC | Age: 73
End: 2023-09-19
Payer: MEDICARE

## 2023-09-19 NOTE — TELEPHONE ENCOUNTER
"Caller: Arley Falk \"CARMINA\"    Relationship: Self    Best call back number: [phone]    What test was performed: URINE     When was the test performed: 09/18/2023    Where was the test performed: IN OFFICE    Additional notes: PATIENT IS REQUESTING RESULTS.   "

## 2023-09-20 LAB — BACTERIA SPEC AEROBE CULT: ABNORMAL

## 2023-09-28 ENCOUNTER — HOSPITAL ENCOUNTER (OUTPATIENT)
Dept: ULTRASOUND IMAGING | Facility: HOSPITAL | Age: 73
Discharge: HOME | End: 2023-09-28
Admitting: FAMILY MEDICINE
Payer: MEDICARE

## 2023-09-28 DIAGNOSIS — N50.89 SWELLING OF LEFT TESTICLE: ICD-10-CM

## 2023-09-28 PROCEDURE — 76870 US EXAM SCROTUM: CPT

## 2023-10-31 ENCOUNTER — OFFICE VISIT (OUTPATIENT)
Dept: FAMILY MEDICINE CLINIC | Facility: CLINIC | Age: 73
End: 2023-10-31
Payer: MEDICARE

## 2023-10-31 VITALS
WEIGHT: 287.8 LBS | HEART RATE: 84 BPM | SYSTOLIC BLOOD PRESSURE: 130 MMHG | BODY MASS INDEX: 41.2 KG/M2 | OXYGEN SATURATION: 100 % | HEIGHT: 70 IN | TEMPERATURE: 97.8 F | DIASTOLIC BLOOD PRESSURE: 70 MMHG

## 2023-10-31 DIAGNOSIS — Z12.5 SCREENING PSA (PROSTATE SPECIFIC ANTIGEN): ICD-10-CM

## 2023-10-31 DIAGNOSIS — N40.0 BENIGN PROSTATIC HYPERPLASIA WITHOUT LOWER URINARY TRACT SYMPTOMS: ICD-10-CM

## 2023-10-31 DIAGNOSIS — R73.03 PREDIABETES: ICD-10-CM

## 2023-10-31 DIAGNOSIS — J30.89 NON-SEASONAL ALLERGIC RHINITIS, UNSPECIFIED TRIGGER: ICD-10-CM

## 2023-10-31 DIAGNOSIS — N32.81 OVERACTIVE BLADDER: ICD-10-CM

## 2023-10-31 DIAGNOSIS — E78.2 MIXED HYPERLIPIDEMIA: ICD-10-CM

## 2023-10-31 DIAGNOSIS — Z12.11 SCREEN FOR COLON CANCER: ICD-10-CM

## 2023-10-31 DIAGNOSIS — M19.90 ARTHRITIS: ICD-10-CM

## 2023-10-31 DIAGNOSIS — M1A.9XX0 CHRONIC GOUT WITHOUT TOPHUS, UNSPECIFIED CAUSE, UNSPECIFIED SITE: ICD-10-CM

## 2023-10-31 DIAGNOSIS — E66.01 CLASS 3 SEVERE OBESITY DUE TO EXCESS CALORIES WITH SERIOUS COMORBIDITY AND BODY MASS INDEX (BMI) OF 40.0 TO 44.9 IN ADULT: ICD-10-CM

## 2023-10-31 DIAGNOSIS — I10 PRIMARY HYPERTENSION: ICD-10-CM

## 2023-10-31 DIAGNOSIS — Z87.891 FORMER TOBACCO USE: Primary | ICD-10-CM

## 2023-10-31 PROBLEM — M25.512 PAIN OF BOTH SHOULDER JOINTS: Status: ACTIVE | Noted: 2023-02-07

## 2023-10-31 PROBLEM — M25.511 PAIN OF BOTH SHOULDER JOINTS: Status: ACTIVE | Noted: 2023-02-07

## 2023-10-31 PROBLEM — H91.90 HEARING LOSS: Status: ACTIVE | Noted: 2023-10-31

## 2023-10-31 PROBLEM — E66.813 CLASS 3 SEVERE OBESITY DUE TO EXCESS CALORIES WITH SERIOUS COMORBIDITY AND BODY MASS INDEX (BMI) OF 40.0 TO 44.9 IN ADULT: Status: ACTIVE | Noted: 2023-10-31

## 2023-10-31 LAB
ALBUMIN SERPL-MCNC: 4.4 G/DL (ref 3.5–5.2)
ALBUMIN/GLOB SERPL: 1.7 G/DL
ALP SERPL-CCNC: 68 U/L (ref 39–117)
ALT SERPL W P-5'-P-CCNC: 20 U/L (ref 1–41)
ANION GAP SERPL CALCULATED.3IONS-SCNC: 11.9 MMOL/L (ref 5–15)
AST SERPL-CCNC: 18 U/L (ref 1–40)
BASOPHILS # BLD AUTO: 0.03 10*3/MM3 (ref 0–0.2)
BASOPHILS NFR BLD AUTO: 0.4 % (ref 0–1.5)
BILIRUB SERPL-MCNC: 0.4 MG/DL (ref 0–1.2)
BUN SERPL-MCNC: 17 MG/DL (ref 8–23)
BUN/CREAT SERPL: 17.5 (ref 7–25)
CALCIUM SPEC-SCNC: 9.4 MG/DL (ref 8.6–10.5)
CHLORIDE SERPL-SCNC: 98 MMOL/L (ref 98–107)
CHOLEST SERPL-MCNC: 175 MG/DL (ref 0–200)
CO2 SERPL-SCNC: 27.1 MMOL/L (ref 22–29)
CREAT SERPL-MCNC: 0.97 MG/DL (ref 0.76–1.27)
DEPRECATED RDW RBC AUTO: 47.5 FL (ref 37–54)
EGFRCR SERPLBLD CKD-EPI 2021: 81.4 ML/MIN/1.73
EOSINOPHIL # BLD AUTO: 0.21 10*3/MM3 (ref 0–0.4)
EOSINOPHIL NFR BLD AUTO: 3 % (ref 0.3–6.2)
ERYTHROCYTE [DISTWIDTH] IN BLOOD BY AUTOMATED COUNT: 14.3 % (ref 12.3–15.4)
GLOBULIN UR ELPH-MCNC: 2.6 GM/DL
GLUCOSE SERPL-MCNC: 121 MG/DL (ref 65–99)
HBA1C MFR BLD: 5.9 % (ref 4.8–5.6)
HCT VFR BLD AUTO: 35.7 % (ref 37.5–51)
HDLC SERPL-MCNC: 48 MG/DL (ref 40–60)
HGB BLD-MCNC: 12.5 G/DL (ref 13–17.7)
IMM GRANULOCYTES # BLD AUTO: 0.06 10*3/MM3 (ref 0–0.05)
IMM GRANULOCYTES NFR BLD AUTO: 0.8 % (ref 0–0.5)
LDLC SERPL CALC-MCNC: 108 MG/DL (ref 0–100)
LDLC/HDLC SERPL: 2.2 {RATIO}
LYMPHOCYTES # BLD AUTO: 1.63 10*3/MM3 (ref 0.7–3.1)
LYMPHOCYTES NFR BLD AUTO: 22.9 % (ref 19.6–45.3)
MCH RBC QN AUTO: 32.1 PG (ref 26.6–33)
MCHC RBC AUTO-ENTMCNC: 35 G/DL (ref 31.5–35.7)
MCV RBC AUTO: 91.5 FL (ref 79–97)
MONOCYTES # BLD AUTO: 0.55 10*3/MM3 (ref 0.1–0.9)
MONOCYTES NFR BLD AUTO: 7.7 % (ref 5–12)
NEUTROPHILS NFR BLD AUTO: 4.63 10*3/MM3 (ref 1.7–7)
NEUTROPHILS NFR BLD AUTO: 65.2 % (ref 42.7–76)
NRBC BLD AUTO-RTO: 0 /100 WBC (ref 0–0.2)
PLATELET # BLD AUTO: 199 10*3/MM3 (ref 140–450)
PMV BLD AUTO: 10.5 FL (ref 6–12)
POTASSIUM SERPL-SCNC: 4 MMOL/L (ref 3.5–5.2)
PROT SERPL-MCNC: 7 G/DL (ref 6–8.5)
PSA SERPL-MCNC: 2.75 NG/ML (ref 0–4)
RBC # BLD AUTO: 3.9 10*6/MM3 (ref 4.14–5.8)
SODIUM SERPL-SCNC: 137 MMOL/L (ref 136–145)
T4 FREE SERPL-MCNC: 1.39 NG/DL (ref 0.93–1.7)
TRIGL SERPL-MCNC: 106 MG/DL (ref 0–150)
TSH SERPL DL<=0.05 MIU/L-ACNC: 2.38 UIU/ML (ref 0.27–4.2)
URATE SERPL-MCNC: 7.8 MG/DL (ref 3.4–7)
VLDLC SERPL-MCNC: 19 MG/DL (ref 5–40)
WBC NRBC COR # BLD: 7.11 10*3/MM3 (ref 3.4–10.8)

## 2023-10-31 PROCEDURE — 84439 ASSAY OF FREE THYROXINE: CPT | Performed by: FAMILY MEDICINE

## 2023-10-31 PROCEDURE — G0103 PSA SCREENING: HCPCS | Performed by: FAMILY MEDICINE

## 2023-10-31 PROCEDURE — 84550 ASSAY OF BLOOD/URIC ACID: CPT | Performed by: FAMILY MEDICINE

## 2023-10-31 PROCEDURE — 80053 COMPREHEN METABOLIC PANEL: CPT | Performed by: FAMILY MEDICINE

## 2023-10-31 PROCEDURE — 85025 COMPLETE CBC W/AUTO DIFF WBC: CPT | Performed by: FAMILY MEDICINE

## 2023-10-31 PROCEDURE — 80061 LIPID PANEL: CPT | Performed by: FAMILY MEDICINE

## 2023-10-31 PROCEDURE — 83036 HEMOGLOBIN GLYCOSYLATED A1C: CPT | Performed by: FAMILY MEDICINE

## 2023-10-31 PROCEDURE — 84443 ASSAY THYROID STIM HORMONE: CPT | Performed by: FAMILY MEDICINE

## 2023-10-31 RX ORDER — CHLORAL HYDRATE 500 MG
2000 CAPSULE ORAL 2 TIMES DAILY WITH MEALS
Qty: 360 CAPSULE | Refills: 1 | Status: SHIPPED | OUTPATIENT
Start: 2023-10-31

## 2023-10-31 RX ORDER — VIBEGRON 75 MG/1
75 TABLET, FILM COATED ORAL DAILY
Qty: 90 TABLET | Refills: 1 | Status: SHIPPED | OUTPATIENT
Start: 2023-10-31

## 2023-10-31 RX ORDER — ALLOPURINOL 100 MG/1
100 TABLET ORAL DAILY
Qty: 90 TABLET | Refills: 1 | Status: SHIPPED | OUTPATIENT
Start: 2023-10-31

## 2023-10-31 RX ORDER — LISINOPRIL AND HYDROCHLOROTHIAZIDE 20; 12.5 MG/1; MG/1
2 TABLET ORAL DAILY
Qty: 180 TABLET | Refills: 1 | Status: SHIPPED | OUTPATIENT
Start: 2023-10-31

## 2023-10-31 RX ORDER — CELECOXIB 200 MG/1
200 CAPSULE ORAL DAILY
Qty: 90 CAPSULE | Refills: 1 | Status: SHIPPED | OUTPATIENT
Start: 2023-10-31

## 2023-10-31 RX ORDER — AMLODIPINE BESYLATE 5 MG/1
5 TABLET ORAL DAILY
Qty: 90 TABLET | Refills: 1 | Status: SHIPPED | OUTPATIENT
Start: 2023-10-31

## 2023-10-31 RX ORDER — VIBEGRON 75 MG/1
1 TABLET, FILM COATED ORAL DAILY
COMMUNITY
End: 2023-10-31 | Stop reason: SDUPTHER

## 2023-10-31 RX ORDER — MONTELUKAST SODIUM 10 MG/1
10 TABLET ORAL DAILY
Qty: 90 TABLET | Refills: 1 | Status: SHIPPED | OUTPATIENT
Start: 2023-10-31

## 2023-10-31 RX ORDER — TAMSULOSIN HYDROCHLORIDE 0.4 MG/1
0.4 CAPSULE ORAL DAILY
Qty: 90 CAPSULE | Refills: 1 | Status: SHIPPED | OUTPATIENT
Start: 2023-10-31

## 2023-10-31 NOTE — PROGRESS NOTES
Chief Complaint  Hypertension and Hyperlipidemia    Subjective          Arley Falk presents to [facility]  History of Present Illness  Gout controlled on med- pt has had no attacks since last visit  Hypertension  This is a chronic problem. The current episode started more than 1 year ago. The problem has been gradually improving since onset. The problem is controlled. Pertinent negatives include no anxiety, blurred vision, chest pain, headaches, malaise/fatigue, neck pain, orthopnea, palpitations, peripheral edema, PND, shortness of breath or sweats. There are no associated agents to hypertension. Risk factors for coronary artery disease include dyslipidemia, family history and male gender. Current antihypertension treatment includes ACE inhibitors, diuretics and calcium channel blockers. The current treatment provides significant improvement. There are no compliance problems.    Hyperlipidemia  This is a chronic problem. The current episode started more than 1 year ago. The problem is uncontrolled. Recent lipid tests were reviewed and are variable. There are no known factors aggravating his hyperlipidemia. Pertinent negatives include no chest pain, focal sensory loss, focal weakness, leg pain, myalgias or shortness of breath. Current antihyperlipidemic treatment includes statins. The current treatment provides significant improvement of lipids. There are no compliance problems.                 Objective   No Known Allergies  Immunization History   Administered Date(s) Administered    COVID-19 (MODERNA) 1st,2nd,3rd Dose Monovalent 02/25/2021, 03/25/2021, 11/02/2021    COVID-19 (MODERNA) BIVALENT 12+YRS 10/14/2022    COVID-19 (UNSPECIFIED) 11/02/2021    Flu Vaccine Quad PF >36MO 11/15/2019    Fluzone High-Dose 65+yrs 10/13/2022    Pneumococcal Conjugate 20-Valent (PCV20) 05/02/2023    Pneumococcal Polysaccharide (PPSV23) 03/18/2020    Tdap 02/25/2021     Past Medical  History:   Diagnosis Date    Allergic     Gout     Hyperlipidemia     Hypertension     Sleep apnea       History reviewed. No pertinent surgical history.   Social History     Socioeconomic History    Marital status:    Tobacco Use    Smoking status: Former     Packs/day: 1.00     Years: 50.00     Additional pack years: 0.00     Total pack years: 50.00     Types: Cigarettes     Start date:      Quit date: 10/18/1987     Years since quittin.0     Passive exposure: Never    Smokeless tobacco: Former     Quit date: 10/18/2017    Tobacco comments:     quit    Vaping Use    Vaping Use: Never used   Substance and Sexual Activity    Alcohol use: Yes     Alcohol/week: 2.0 standard drinks of alcohol     Types: 2 Cans of beer per week    Drug use: Never    Sexual activity: Yes     Partners: Female        Current Outpatient Medications:     allopurinol (ZYLOPRIM) 100 MG tablet, Take 1 tablet by mouth Daily., Disp: 90 tablet, Rfl: 1    amLODIPine (NORVASC) 5 MG tablet, Take 1 tablet by mouth Daily., Disp: 90 tablet, Rfl: 1    celecoxib (CeleBREX) 200 MG capsule, Take 1 capsule by mouth Daily., Disp: 90 capsule, Rfl: 1    furosemide (LASIX) 20 MG tablet, Take 1 tablet by mouth Daily., Disp: , Rfl:     lisinopril-hydrochlorothiazide (PRINZIDE,ZESTORETIC) 20-12.5 MG per tablet, Take 2 tablets by mouth Daily., Disp: 180 tablet, Rfl: 1    montelukast (SINGULAIR) 10 MG tablet, Take 1 tablet by mouth Daily., Disp: 90 tablet, Rfl: 1    Nutritional Supplements (NUTRITIONAL SUPPLEMENT PO), Take  by mouth. Crave Supplement, Disp: , Rfl:     Omega-3 Fatty Acids (fish oil) 1000 MG capsule capsule, Take 2 capsules by mouth 2 (Two) Times a Day With Meals., Disp: 360 capsule, Rfl: 1    tamsulosin (FLOMAX) 0.4 MG capsule 24 hr capsule, Take 1 capsule by mouth Daily., Disp: 90 capsule, Rfl: 1    vitamin E 600 UNIT capsule, vitamin E 600 unit oral capsule take 1 capsule by oral route daily   Active, Disp: , Rfl:     Vibegron  "(Gemtesa) 75 MG tablet, Take 1 tablet by mouth Daily., Disp: 90 tablet, Rfl: 1   Family History   Problem Relation Age of Onset    Depression Mother     Diabetes Mother     Heart defect Father     Kidney disease Sister           Vital Signs:   Vitals:    10/31/23 0910   BP: 130/70   BP Location: Left arm   Patient Position: Sitting   Cuff Size: Adult   Pulse: 84   Temp: 97.8 °F (36.6 °C)   SpO2: 100%   Weight: 131 kg (287 lb 12.8 oz)   Height: 177.8 cm (70\")       Review of Systems   Constitutional:  Negative for malaise/fatigue.   Eyes:  Negative for blurred vision.   Respiratory:  Negative for shortness of breath.    Cardiovascular:  Negative for chest pain, palpitations, orthopnea and PND.   Musculoskeletal:  Negative for myalgias and neck pain.   Neurological:  Negative for focal weakness and headaches.      Physical Exam  Vitals reviewed.   Constitutional:       Appearance: Normal appearance. He is well-developed.   HENT:      Head: Normocephalic and atraumatic.      Right Ear: External ear normal.      Left Ear: External ear normal.      Mouth/Throat:      Pharynx: No oropharyngeal exudate.   Eyes:      Conjunctiva/sclera: Conjunctivae normal.      Pupils: Pupils are equal, round, and reactive to light.   Cardiovascular:      Rate and Rhythm: Normal rate and regular rhythm.      Pulses: Normal pulses.      Heart sounds: Normal heart sounds. No murmur heard.     No friction rub. No gallop.   Pulmonary:      Effort: Pulmonary effort is normal.      Breath sounds: Normal breath sounds. No wheezing or rhonchi.   Abdominal:      General: Abdomen is flat. Bowel sounds are normal. There is no distension.      Palpations: Abdomen is soft. There is no mass.      Tenderness: There is no abdominal tenderness. There is no guarding or rebound.      Hernia: No hernia is present.   Musculoskeletal:         General: Normal range of motion.   Skin:     General: Skin is warm and dry.      Capillary Refill: Capillary refill " takes less than 2 seconds.   Neurological:      General: No focal deficit present.      Mental Status: He is alert and oriented to person, place, and time.      Cranial Nerves: No cranial nerve deficit.   Psychiatric:         Mood and Affect: Mood and affect normal.         Behavior: Behavior normal.         Thought Content: Thought content normal.         Judgment: Judgment normal.        Result Review :   The following data was reviewed by: Alfred Church MD on 10/31/2023:  CMP          5/2/2023    11:40   CMP   Glucose 113    BUN 10    Creatinine 1.01    EGFR 78.0    Sodium 141    Potassium 4.2    Chloride 101    Calcium 10.1    Total Protein 7.2    Albumin 4.8    Globulin 2.4    Total Bilirubin 0.6    Alkaline Phosphatase 67    AST (SGOT) 19    ALT (SGPT) 31    Albumin/Globulin Ratio 2.0    BUN/Creatinine Ratio 9.9    Anion Gap 12.1      CBC          5/2/2023    11:40   CBC   WBC 6.41    RBC 4.88    Hemoglobin 15.7    Hematocrit 44.5    MCV 91.2    MCH 32.2    MCHC 35.3    RDW 12.6    Platelets 182      Lipid Panel          5/2/2023    11:40   Lipid Panel   Total Cholesterol 180    Triglycerides 146    HDL Cholesterol 40    VLDL Cholesterol 26    LDL Cholesterol  114    LDL/HDL Ratio 2.77      TSH          5/2/2023    11:40   TSH   TSH 2.260      Most Recent A1C          5/2/2023    11:40   HGBA1C Most Recent   Hemoglobin A1C 6.10                  Assessment and Plan    Diagnoses and all orders for this visit:    1. Former tobacco use (Primary)    2. Class 3 severe obesity due to excess calories with serious comorbidity and body mass index (BMI) of 40.0 to 44.9 in adult    3. Screen for colon cancer  -     Ambulatory Referral For Screening Colonoscopy    4. Overactive bladder  -     Vibegron (Gemtesa) 75 MG tablet; Take 1 tablet by mouth Daily.  Dispense: 90 tablet; Refill: 1    5. Primary hypertension  -     amLODIPine (NORVASC) 5 MG tablet; Take 1 tablet by mouth Daily.  Dispense: 90 tablet; Refill: 1  -      lisinopril-hydrochlorothiazide (PRINZIDE,ZESTORETIC) 20-12.5 MG per tablet; Take 2 tablets by mouth Daily.  Dispense: 180 tablet; Refill: 1  -     CBC Auto Differential  -     Comprehensive Metabolic Panel  -     Lipid Panel  -     TSH+Free T4    6. Chronic gout without tophus, unspecified cause, unspecified site  -     allopurinol (ZYLOPRIM) 100 MG tablet; Take 1 tablet by mouth Daily.  Dispense: 90 tablet; Refill: 1  -     Uric Acid    7. Arthritis  -     celecoxib (CeleBREX) 200 MG capsule; Take 1 capsule by mouth Daily.  Dispense: 90 capsule; Refill: 1    8. Non-seasonal allergic rhinitis, unspecified trigger  -     montelukast (SINGULAIR) 10 MG tablet; Take 1 tablet by mouth Daily.  Dispense: 90 tablet; Refill: 1    9. Mixed hyperlipidemia  -     Omega-3 Fatty Acids (fish oil) 1000 MG capsule capsule; Take 2 capsules by mouth 2 (Two) Times a Day With Meals.  Dispense: 360 capsule; Refill: 1  -     Lipid Panel    10. Benign prostatic hyperplasia without lower urinary tract symptoms  -     tamsulosin (FLOMAX) 0.4 MG capsule 24 hr capsule; Take 1 capsule by mouth Daily.  Dispense: 90 capsule; Refill: 1    11. Prediabetes  -     Hemoglobin A1c    12. Screening PSA (prostate specific antigen)  -     PSA Screen            Follow Up   Return in about 6 months (around 4/30/2024) for Recheck.  Patient was given instructions and counseling regarding his condition or for health maintenance advice. Please see specific information pulled into the AVS if appropriate.

## 2023-11-09 ENCOUNTER — TELEPHONE (OUTPATIENT)
Dept: FAMILY MEDICINE CLINIC | Facility: CLINIC | Age: 73
End: 2023-11-09
Payer: MEDICARE

## 2023-11-09 NOTE — TELEPHONE ENCOUNTER
Pt agrees with increasing allopurinol to 300 mg and also would like the additional labs to check iron levels

## 2023-11-10 ENCOUNTER — CLINICAL SUPPORT (OUTPATIENT)
Dept: FAMILY MEDICINE CLINIC | Facility: CLINIC | Age: 73
End: 2023-11-10
Payer: MEDICARE

## 2023-11-10 DIAGNOSIS — D64.9 ANEMIA, UNSPECIFIED TYPE: Primary | ICD-10-CM

## 2023-11-10 DIAGNOSIS — I10 PRIMARY HYPERTENSION: ICD-10-CM

## 2023-11-10 DIAGNOSIS — M1A.9XX0 CHRONIC GOUT WITHOUT TOPHUS, UNSPECIFIED CAUSE, UNSPECIFIED SITE: ICD-10-CM

## 2023-11-10 DIAGNOSIS — E78.00 HYPERCHOLESTEROLEMIA: Primary | ICD-10-CM

## 2023-11-10 LAB
BASOPHILS # BLD AUTO: 0.03 10*3/MM3 (ref 0–0.2)
BASOPHILS NFR BLD AUTO: 0.3 % (ref 0–1.5)
DEPRECATED RDW RBC AUTO: 44.8 FL (ref 37–54)
EOSINOPHIL # BLD AUTO: 0.21 10*3/MM3 (ref 0–0.4)
EOSINOPHIL NFR BLD AUTO: 2.2 % (ref 0.3–6.2)
ERYTHROCYTE [DISTWIDTH] IN BLOOD BY AUTOMATED COUNT: 13.6 % (ref 12.3–15.4)
FOLATE SERPL-MCNC: 9.55 NG/ML (ref 4.78–24.2)
HCT VFR BLD AUTO: 37.8 % (ref 37.5–51)
HGB BLD-MCNC: 13.2 G/DL (ref 13–17.7)
IMM GRANULOCYTES # BLD AUTO: 0.04 10*3/MM3 (ref 0–0.05)
IMM GRANULOCYTES NFR BLD AUTO: 0.4 % (ref 0–0.5)
IRON 24H UR-MRATE: 45 MCG/DL (ref 59–158)
IRON SATN MFR SERPL: 11 % (ref 20–50)
LYMPHOCYTES # BLD AUTO: 1.94 10*3/MM3 (ref 0.7–3.1)
LYMPHOCYTES NFR BLD AUTO: 19.9 % (ref 19.6–45.3)
MCH RBC QN AUTO: 31.5 PG (ref 26.6–33)
MCHC RBC AUTO-ENTMCNC: 34.9 G/DL (ref 31.5–35.7)
MCV RBC AUTO: 90.2 FL (ref 79–97)
MONOCYTES # BLD AUTO: 0.65 10*3/MM3 (ref 0.1–0.9)
MONOCYTES NFR BLD AUTO: 6.7 % (ref 5–12)
NEUTROPHILS NFR BLD AUTO: 6.89 10*3/MM3 (ref 1.7–7)
NEUTROPHILS NFR BLD AUTO: 70.5 % (ref 42.7–76)
NRBC BLD AUTO-RTO: 0 /100 WBC (ref 0–0.2)
PLATELET # BLD AUTO: 190 10*3/MM3 (ref 140–450)
PMV BLD AUTO: 10.7 FL (ref 6–12)
RBC # BLD AUTO: 4.19 10*6/MM3 (ref 4.14–5.8)
TIBC SERPL-MCNC: 414 MCG/DL (ref 298–536)
TRANSFERRIN SERPL-MCNC: 278 MG/DL (ref 200–360)
VIT B12 BLD-MCNC: 330 PG/ML (ref 211–946)
WBC NRBC COR # BLD: 9.76 10*3/MM3 (ref 3.4–10.8)

## 2023-11-10 PROCEDURE — 82746 ASSAY OF FOLIC ACID SERUM: CPT | Performed by: FAMILY MEDICINE

## 2023-11-10 PROCEDURE — 36415 COLL VENOUS BLD VENIPUNCTURE: CPT | Performed by: FAMILY MEDICINE

## 2023-11-10 PROCEDURE — 85025 COMPLETE CBC W/AUTO DIFF WBC: CPT | Performed by: FAMILY MEDICINE

## 2023-11-10 PROCEDURE — 83540 ASSAY OF IRON: CPT | Performed by: FAMILY MEDICINE

## 2023-11-10 PROCEDURE — 82607 VITAMIN B-12: CPT | Performed by: FAMILY MEDICINE

## 2023-11-10 PROCEDURE — 84466 ASSAY OF TRANSFERRIN: CPT | Performed by: FAMILY MEDICINE

## 2023-11-10 RX ORDER — ALLOPURINOL 300 MG/1
300 TABLET ORAL DAILY
Qty: 90 TABLET | Refills: 1 | Status: SHIPPED | OUTPATIENT
Start: 2023-11-10

## 2023-11-10 NOTE — PROGRESS NOTES
..  Venipuncture Blood Specimen Collection  Venipuncture performed in left arm by Abimbola Mcclain with good hemostasis. Patient tolerated the procedure well without complications.   11/10/23   Abimbola Mcclain

## 2023-11-11 NOTE — PROGRESS NOTES
Labs show mildly decreased iron levels.  Take 1 iron tablet daily.  Recheck levels in 2 months.  Call me if you do not know what to take.

## 2023-11-28 ENCOUNTER — CLINICAL SUPPORT (OUTPATIENT)
Dept: GASTROENTEROLOGY | Facility: CLINIC | Age: 73
End: 2023-11-28

## 2023-11-28 ENCOUNTER — TELEPHONE (OUTPATIENT)
Dept: GASTROENTEROLOGY | Facility: CLINIC | Age: 73
End: 2023-11-28
Payer: MEDICARE

## 2023-11-28 ENCOUNTER — PREP FOR SURGERY (OUTPATIENT)
Dept: OTHER | Facility: HOSPITAL | Age: 73
End: 2023-11-28
Payer: MEDICARE

## 2023-11-28 DIAGNOSIS — Z86.010 HISTORY OF COLON POLYPS: Primary | ICD-10-CM

## 2023-11-28 NOTE — TELEPHONE ENCOUNTER
Rep from [facility] called to states pt has same benefits out of network that he does in network and colonoscopy would be covered

## 2023-11-28 NOTE — TELEPHONE ENCOUNTER
Called pt for DA screening phone call appt - pt states he wants to check with his insurance before scheduling due to having [employer] - PT states will call me back today

## 2023-11-28 NOTE — PROGRESS NOTES
Arley Wyattock  1950  73 y.o.    Reason for call: Recall 3 yr colon recall    Prep prescribed: Nulygonzalez  Prep instructions reviewed with patient and sent to patient via regular mail to the home address on file    Clearance needed? none    Is the patient currently on any injectable medications for weight loss or diabetes? No      Family history of colon cancer? No  If yes, indicate relative:     The patient has been scheduled for: Colonoscopy  Procedure Date: 24  Family History   Problem Relation Age of Onset    Depression Mother     Diabetes Mother     Heart defect Father     Kidney disease Sister     Colon cancer Neg Hx      Past Medical History:   Diagnosis Date    Allergic     Gout     Hyperlipidemia     Hypertension     Sleep apnea      No Known Allergies  Past Surgical History:   Procedure Laterality Date    COLONOSCOPY  2020     Social History     Socioeconomic History    Marital status:    Tobacco Use    Smoking status: Former     Packs/day: 1.00     Years: 50.00     Additional pack years: 0.00     Total pack years: 50.00     Types: Cigarettes     Start date:      Quit date: 10/18/1987     Years since quittin.1     Passive exposure: Never    Smokeless tobacco: Former     Quit date: 10/18/2017    Tobacco comments:     quit    Vaping Use    Vaping Use: Never used   Substance and Sexual Activity    Alcohol use: Yes     Alcohol/week: 2.0 standard drinks of alcohol     Types: 2 Cans of beer per week    Drug use: Never    Sexual activity: Yes     Partners: Female       Current Outpatient Medications:     allopurinol (ZYLOPRIM) 300 MG tablet, Take 1 tablet by mouth Daily., Disp: 90 tablet, Rfl: 1    amLODIPine (NORVASC) 5 MG tablet, Take 1 tablet by mouth Daily., Disp: 90 tablet, Rfl: 1    celecoxib (CeleBREX) 200 MG capsule, Take 1 capsule by mouth Daily., Disp: 90 capsule, Rfl: 1    furosemide (LASIX) 20 MG tablet, Take 1 tablet by mouth Daily., Disp: , Rfl:      lisinopril-hydrochlorothiazide (PRINZIDE,ZESTORETIC) 20-12.5 MG per tablet, Take 2 tablets by mouth Daily., Disp: 180 tablet, Rfl: 1    montelukast (SINGULAIR) 10 MG tablet, Take 1 tablet by mouth Daily., Disp: 90 tablet, Rfl: 1    Nutritional Supplements (NUTRITIONAL SUPPLEMENT PO), Take  by mouth. Crave Supplement, Disp: , Rfl:     Omega-3 Fatty Acids (fish oil) 1000 MG capsule capsule, Take 2 capsules by mouth 2 (Two) Times a Day With Meals., Disp: 360 capsule, Rfl: 1    tamsulosin (FLOMAX) 0.4 MG capsule 24 hr capsule, Take 1 capsule by mouth Daily., Disp: 90 capsule, Rfl: 1    Vibegron (Gemtesa) 75 MG tablet, Take 1 tablet by mouth Daily., Disp: 90 tablet, Rfl: 1    vitamin E 600 UNIT capsule, vitamin E 600 unit oral capsule take 1 capsule by oral route daily   Active, Disp: , Rfl:

## 2023-11-29 PROBLEM — Z86.0100 HISTORY OF COLON POLYPS: Status: ACTIVE | Noted: 2023-11-28

## 2023-11-29 PROBLEM — Z86.010 HISTORY OF COLON POLYPS: Status: ACTIVE | Noted: 2023-11-28

## 2024-01-11 ENCOUNTER — OFFICE VISIT (OUTPATIENT)
Dept: FAMILY MEDICINE CLINIC | Facility: CLINIC | Age: 74
End: 2024-01-11
Payer: MEDICARE

## 2024-01-11 VITALS
SYSTOLIC BLOOD PRESSURE: 146 MMHG | HEART RATE: 91 BPM | OXYGEN SATURATION: 95 % | DIASTOLIC BLOOD PRESSURE: 76 MMHG | BODY MASS INDEX: 40.65 KG/M2 | TEMPERATURE: 98.8 F | WEIGHT: 283.3 LBS

## 2024-01-11 DIAGNOSIS — E61.1 IRON DEFICIENCY: ICD-10-CM

## 2024-01-11 DIAGNOSIS — N30.01 ACUTE CYSTITIS WITH HEMATURIA: ICD-10-CM

## 2024-01-11 DIAGNOSIS — R53.83 OTHER FATIGUE: Primary | ICD-10-CM

## 2024-01-11 DIAGNOSIS — R35.0 URINARY FREQUENCY: ICD-10-CM

## 2024-01-11 LAB
BILIRUB BLD-MCNC: NEGATIVE MG/DL
CLARITY, POC: ABNORMAL
COLOR UR: YELLOW
EXPIRATION DATE: NORMAL
FERRITIN SERPL-MCNC: 408 NG/ML (ref 30–400)
FLUAV AG UPPER RESP QL IA.RAPID: NOT DETECTED
FLUBV AG UPPER RESP QL IA.RAPID: NOT DETECTED
GLUCOSE UR STRIP-MCNC: NEGATIVE MG/DL
INTERNAL CONTROL: NORMAL
IRON 24H UR-MRATE: 20 MCG/DL (ref 59–158)
IRON SATN MFR SERPL: 7 % (ref 20–50)
KETONES UR QL: NEGATIVE
LEUKOCYTE EST, POC: ABNORMAL
Lab: NORMAL
NITRITE UR-MCNC: POSITIVE MG/ML
PH UR: 5.5 [PH] (ref 5–8)
PROT UR STRIP-MCNC: ABNORMAL MG/DL
RBC # UR STRIP: ABNORMAL /UL
SARS-COV-2 AG UPPER RESP QL IA.RAPID: NOT DETECTED
SP GR UR: 1.01 (ref 1–1.03)
TIBC SERPL-MCNC: 307 MCG/DL (ref 298–536)
TRANSFERRIN SERPL-MCNC: 206 MG/DL (ref 200–360)
UROBILINOGEN UR QL: NORMAL

## 2024-01-11 PROCEDURE — 82728 ASSAY OF FERRITIN: CPT | Performed by: FAMILY MEDICINE

## 2024-01-11 PROCEDURE — 87428 SARSCOV & INF VIR A&B AG IA: CPT | Performed by: FAMILY MEDICINE

## 2024-01-11 PROCEDURE — 84466 ASSAY OF TRANSFERRIN: CPT | Performed by: FAMILY MEDICINE

## 2024-01-11 PROCEDURE — 81002 URINALYSIS NONAUTO W/O SCOPE: CPT | Performed by: FAMILY MEDICINE

## 2024-01-11 PROCEDURE — 87086 URINE CULTURE/COLONY COUNT: CPT | Performed by: FAMILY MEDICINE

## 2024-01-11 PROCEDURE — 87186 SC STD MICRODIL/AGAR DIL: CPT | Performed by: FAMILY MEDICINE

## 2024-01-11 PROCEDURE — 83540 ASSAY OF IRON: CPT | Performed by: FAMILY MEDICINE

## 2024-01-11 PROCEDURE — 99214 OFFICE O/P EST MOD 30 MIN: CPT | Performed by: FAMILY MEDICINE

## 2024-01-11 PROCEDURE — 87088 URINE BACTERIA CULTURE: CPT | Performed by: FAMILY MEDICINE

## 2024-01-11 RX ORDER — NITROFURANTOIN 25; 75 MG/1; MG/1
100 CAPSULE ORAL 2 TIMES DAILY
Qty: 14 CAPSULE | Refills: 0 | Status: SHIPPED | OUTPATIENT
Start: 2024-01-11 | End: 2024-01-18

## 2024-01-11 NOTE — PROGRESS NOTES
Venipuncture Blood Specimen Collection  Venipuncture performed in LEFT ARM  by Sandra Mcclain with good hemostasis. Patient tolerated the procedure well without complications.   01/11/24   Sandra Mcclain

## 2024-01-11 NOTE — PROGRESS NOTES
Chief Complaint  Generalized Body Aches, Fatigue, and Nausea    Subjective      History of Present Illness  Arley Falk is a 73 y.o. male who presents to [facility] with a past medical history of  Past Medical History:   Diagnosis Date    Allergic     Gout     Hyperlipidemia     Hypertension     Sleep apnea      Generalized body aches. Low appetite, fatigue, nausea. Having some urinary symptoms - having frequency. Symptoms since yesterday. No cough. Chills and his wife thought he felt a little warm. Lots of nasal drainage. Ears feel stopped up.  Had a UTI back in the summer they gave him similar symptoms, except the upper respiratory stuff.    He was also found to be mildly iron deficient 2 months ago and was started on p.o. iron and told to recheck his iron levels in 2 months.  He wants to check that today.    Objective   Vital Signs:   Vitals:    01/11/24 1647   BP: 146/76   BP Location: Right arm   Patient Position: Sitting   Cuff Size: Adult   Pulse: 91   Temp: 98.8 °F (37.1 °C)   SpO2: 95%   Weight: 129 kg (283 lb 4.8 oz)     Body mass index is 40.65 kg/m².    Wt Readings from Last 3 Encounters:   01/11/24 129 kg (283 lb 4.8 oz)   10/31/23 131 kg (287 lb 12.8 oz)   09/18/23 125 kg (275 lb)     BP Readings from Last 3 Encounters:   01/11/24 146/76   10/31/23 130/70   09/18/23 128/72       Health Maintenance   Topic Date Due    ZOSTER VACCINE (1 of 2) Never done    COLORECTAL CANCER SCREENING  06/30/2023    COVID-19 Vaccine (6 - 2023-24 season) 09/01/2023    ANNUAL WELLNESS VISIT  05/02/2024    BMI FOLLOWUP  05/02/2024    LIPID PANEL  10/31/2024    TDAP/TD VACCINES (2 - Td or Tdap) 02/25/2031    HEPATITIS C SCREENING  Completed    INFLUENZA VACCINE  Completed    Pneumococcal Vaccine 65+  Completed    AAA SCREEN (ONE-TIME)  Completed       Physical Exam  Vitals and nursing note reviewed.   Constitutional:       General: He is not in acute distress.  HENT:      Nose:  Congestion and rhinorrhea present.      Mouth/Throat:      Pharynx: No posterior oropharyngeal erythema.   Cardiovascular:      Rate and Rhythm: Normal rate and regular rhythm.      Heart sounds: Normal heart sounds.   Pulmonary:      Effort: Pulmonary effort is normal. No respiratory distress.      Breath sounds: Normal breath sounds.   Neurological:      Mental Status: He is alert.   Psychiatric:         Mood and Affect: Mood normal.         Behavior: Behavior normal.            Result Review :  The following data was reviewed by: Laron Bryan MD on 01/11/2024:  Lab Results   Lab 01/11/24  1706   SARS ANTIGEN Not Detected      Lab Results   Lab 01/11/24  1706   INFLUENZA A ANTIGEN RAFIA Not Detected                Lab Results   Lab 01/11/24  1706   INFLUENZA B ANTIGEN RAFIA Not Detected                                     Office Visit on 01/11/2024   Component Date Value    SARS Antigen 01/11/2024 Not Detected     Influenza A Antigen RAFIA 01/11/2024 Not Detected     Influenza B Antigen RAFIA 01/11/2024 Not Detected     Internal Control 01/11/2024 Passed     Lot Number 01/11/2024 [id number]     Expiration Date 01/11/2024 [id number]     Color 01/11/2024 Yellow     Clarity, UA 01/11/2024 Slightly Cloudy (A)     Glucose, UA 01/11/2024 Negative     Bilirubin 01/11/2024 Negative     Ketones, UA 01/11/2024 Negative     Specific Gravity  01/11/2024 1.010     Blood, UA 01/11/2024 Large (A)     pH, Urine 01/11/2024 5.5     Protein, POC 01/11/2024 100 mg/dL (A)     Urobilinogen, UA 01/11/2024 Normal     Leukocytes 01/11/2024 Large (3+) (A)     Nitrite, UA 01/11/2024 Positive (A)      Iron Profile (11/10/2023 11:19)     Procedures        Assessment and Plan   Diagnoses and all orders for this visit:    1. Other fatigue (Primary)  -     POCT SARS-CoV-2 + Flu Antigen RAFIA    2. Urinary frequency  -     POCT urinalysis dipstick, manual  -     Urine Culture - Urine, Urine, Clean Catch    3. Iron deficiency  -     Iron Profile  -      Ferritin    4. Acute cystitis with hematuria  -     nitrofurantoin, macrocrystal-monohydrate, (Macrobid) 100 MG capsule; Take 1 capsule by mouth 2 (Two) Times a Day for 7 days.  Dispense: 14 capsule; Refill: 0      Suspected UTI based on symptoms and urine dip.  Reviewed past urine cultures if available -last urine culture shows E. coli which is sensitive to nitrofurantoin, so sent nitrofurantoin.  Will treat with antibiotic and send urine for culture.              FOLLOW UP  Return if symptoms worsen or fail to improve.  Patient was given instructions and counseling regarding his condition or for health maintenance advice. Please see specific information pulled into the AVS if appropriate.       Laron Bryan MD  01/11/24  17:37 EST    CURRENT & DISCONTINUED MEDICATIONS  Current Outpatient Medications   Medication Instructions    allopurinol (ZYLOPRIM) 300 mg, Oral, Daily    amLODIPine (NORVASC) 5 mg, Oral, Daily    celecoxib (CELEBREX) 200 mg, Oral, Daily    fish oil 2,000 mg, Oral, 2 Times Daily With Meals    furosemide (LASIX) 20 MG tablet 1 tablet, Oral, Daily    Gemtesa 75 mg, Oral, Daily    lisinopril-hydrochlorothiazide (PRINZIDE,ZESTORETIC) 20-12.5 MG per tablet 2 tablets, Oral, Daily    montelukast (SINGULAIR) 10 mg, Oral, Daily    nitrofurantoin (macrocrystal-monohydrate) (MACROBID) 100 mg, Oral, 2 Times Daily    Nutritional Supplements (NUTRITIONAL SUPPLEMENT PO) Oral, Crave Supplement     polyethylene glycol (GoLYTELY) 236 g solution Take per office instructions    tamsulosin (FLOMAX) 0.4 mg, Oral, Daily    vitamin E 600 UNIT capsule vitamin E 600 unit oral capsule take 1 capsule by oral route daily   Active       There are no discontinued medications.

## 2024-01-15 LAB — BACTERIA SPEC AEROBE CULT: ABNORMAL

## 2024-02-05 ENCOUNTER — TELEPHONE (OUTPATIENT)
Dept: GASTROENTEROLOGY | Facility: CLINIC | Age: 74
End: 2024-02-05
Payer: MEDICARE

## 2024-02-05 NOTE — TELEPHONE ENCOUNTER
"Hub staff attempted to follow warm transfer process and was unsuccessful     Caller: Arley Falk \"CARMINA\"    Relationship to patient: Self    Best call back number: [phone]     Patient is needing: PT HAS QUESTIONS ABOUT BOWEL PREP FOR PROCEDURE ON 2/7/24. PLEASE CALL TO ADVISE.   "

## 2024-02-06 ENCOUNTER — ANESTHESIA EVENT (OUTPATIENT)
Dept: GASTROENTEROLOGY | Facility: HOSPITAL | Age: 74
End: 2024-02-06
Payer: MEDICARE

## 2024-02-06 NOTE — ANESTHESIA PREPROCEDURE EVALUATION
Anesthesia Evaluation     Patient summary reviewed and Nursing notes reviewed   NPO Solid Status: > 8 hours  NPO Liquid Status: > 4 hours           Airway   Mallampati: III  TM distance: >3 FB  Neck ROM: full  Possible difficult intubation and Large neck circumference  Dental - normal exam     Pulmonary - normal exam   (+) a smoker Former,sleep apnea on CPAP  Cardiovascular   Exercise tolerance: poor (<4 METS)    Rhythm: regular  Rate: normal    (+) hypertension well controlled 2 medications or greater, hyperlipidemia      Neuro/Psych  GI/Hepatic/Renal/Endo    (+) obesity    Musculoskeletal     Abdominal   (+) obese    Abdomen: soft.   Substance History      OB/GYN          Other                      Anesthesia Plan    ASA 3     general   total IV anesthesia  (Total IV Anesthesia    Patient understands anesthesia not responsible for dental damage.  )  intravenous induction     Anesthetic plan, risks, benefits, and alternatives have been provided, discussed and informed consent has been obtained with: patient and spouse/significant other.  Pre-procedure education provided  Plan discussed with CRNA.      CODE STATUS:

## 2024-02-07 ENCOUNTER — HOSPITAL ENCOUNTER (OUTPATIENT)
Facility: HOSPITAL | Age: 74
Setting detail: HOSPITAL OUTPATIENT SURGERY
Discharge: HOME | End: 2024-02-07
Attending: INTERNAL MEDICINE | Admitting: INTERNAL MEDICINE
Payer: MEDICARE

## 2024-02-07 ENCOUNTER — ANESTHESIA (OUTPATIENT)
Dept: GASTROENTEROLOGY | Facility: HOSPITAL | Age: 74
End: 2024-02-07
Payer: MEDICARE

## 2024-02-07 VITALS
TEMPERATURE: 97.4 F | OXYGEN SATURATION: 97 % | DIASTOLIC BLOOD PRESSURE: 89 MMHG | HEIGHT: 72 IN | BODY MASS INDEX: 38.7 KG/M2 | WEIGHT: 285.72 LBS | RESPIRATION RATE: 16 BRPM | SYSTOLIC BLOOD PRESSURE: 117 MMHG | HEART RATE: 76 BPM

## 2024-02-07 DIAGNOSIS — Z86.010 HISTORY OF COLON POLYPS: ICD-10-CM

## 2024-02-07 PROCEDURE — 88305 TISSUE EXAM BY PATHOLOGIST: CPT | Performed by: INTERNAL MEDICINE

## 2024-02-07 PROCEDURE — 25810000003 LACTATED RINGERS PER 1000 ML: Performed by: NURSE ANESTHETIST, CERTIFIED REGISTERED

## 2024-02-07 PROCEDURE — 25010000002 PROPOFOL 10 MG/ML EMULSION: Performed by: NURSE ANESTHETIST, CERTIFIED REGISTERED

## 2024-02-07 PROCEDURE — 25810000003 LACTATED RINGERS PER 1000 ML

## 2024-02-07 RX ORDER — SODIUM CHLORIDE, SODIUM LACTATE, POTASSIUM CHLORIDE, CALCIUM CHLORIDE 600; 310; 30; 20 MG/100ML; MG/100ML; MG/100ML; MG/100ML
30 INJECTION, SOLUTION INTRAVENOUS CONTINUOUS
Status: DISCONTINUED | OUTPATIENT
Start: 2024-02-07 | End: 2024-02-07 | Stop reason: HOSPADM

## 2024-02-07 RX ORDER — SODIUM CHLORIDE, SODIUM LACTATE, POTASSIUM CHLORIDE, CALCIUM CHLORIDE 600; 310; 30; 20 MG/100ML; MG/100ML; MG/100ML; MG/100ML
30 INJECTION, SOLUTION INTRAVENOUS CONTINUOUS
Status: CANCELLED | OUTPATIENT
Start: 2024-02-07

## 2024-02-07 RX ORDER — SODIUM CHLORIDE, SODIUM LACTATE, POTASSIUM CHLORIDE, CALCIUM CHLORIDE 600; 310; 30; 20 MG/100ML; MG/100ML; MG/100ML; MG/100ML
INJECTION, SOLUTION INTRAVENOUS CONTINUOUS PRN
Status: DISCONTINUED | OUTPATIENT
Start: 2024-02-07 | End: 2024-02-07 | Stop reason: SURG

## 2024-02-07 RX ORDER — FERROUS SULFATE 325(65) MG
325 TABLET ORAL
COMMUNITY

## 2024-02-07 RX ADMIN — PROPOFOL 200 MCG/KG/MIN: 10 INJECTION, EMULSION INTRAVENOUS at 08:47

## 2024-02-07 RX ADMIN — SODIUM CHLORIDE, POTASSIUM CHLORIDE, SODIUM LACTATE AND CALCIUM CHLORIDE 30 ML/HR: 600; 310; 30; 20 INJECTION, SOLUTION INTRAVENOUS at 07:49

## 2024-02-07 RX ADMIN — SODIUM CHLORIDE, POTASSIUM CHLORIDE, SODIUM LACTATE AND CALCIUM CHLORIDE: 600; 310; 30; 20 INJECTION, SOLUTION INTRAVENOUS at 08:47

## 2024-02-07 NOTE — H&P
Pre Procedure History & Physical    Chief Complaint:   History of colon polyp    Subjective     HPI:   Past history colon polyps    Past Medical History:   Past Medical History:   Diagnosis Date    Allergic     Gout     Hyperlipidemia     Hypertension     Sleep apnea        Past Surgical History:  Past Surgical History:   Procedure Laterality Date    COLONOSCOPY  06/2020       Family History:  Family History   Problem Relation Age of Onset    Depression Mother     Diabetes Mother     Heart defect Father     Kidney disease Sister     Colon cancer Neg Hx        Social History:   reports that he quit smoking about 36 years ago. His smoking use included cigarettes. He started smoking about 57 years ago. He has a 50.00 pack-year smoking history. He has never been exposed to tobacco smoke. He quit smokeless tobacco use about 6 years ago. He reports current alcohol use of about 2.0 standard drinks of alcohol per week. He reports that he does not use drugs.    Medications:   Medications Prior to Admission   Medication Sig Dispense Refill Last Dose    allopurinol (ZYLOPRIM) 300 MG tablet Take 1 tablet by mouth Daily. 90 tablet 1 2/6/2024    amLODIPine (NORVASC) 5 MG tablet Take 1 tablet by mouth Daily. 90 tablet 1 2/6/2024    celecoxib (CeleBREX) 200 MG capsule Take 1 capsule by mouth Daily. 90 capsule 1 2/6/2024    ferrous sulfate 325 (65 FE) MG tablet Take 1 tablet by mouth Daily With Breakfast.   2/6/2024    furosemide (LASIX) 20 MG tablet Take 1 tablet by mouth Daily.   2/6/2024    lisinopril-hydrochlorothiazide (PRINZIDE,ZESTORETIC) 20-12.5 MG per tablet Take 2 tablets by mouth Daily. 180 tablet 1 2/6/2024    montelukast (SINGULAIR) 10 MG tablet Take 1 tablet by mouth Daily. 90 tablet 1 2/6/2024    Nutritional Supplements (NUTRITIONAL SUPPLEMENT PO) Take  by mouth. Crave Supplement   2/6/2024    Omega-3 Fatty Acids (fish oil) 1000 MG capsule capsule Take 2 capsules by mouth 2 (Two) Times a Day With Meals. 360 capsule 1  "2/6/2024    tamsulosin (FLOMAX) 0.4 MG capsule 24 hr capsule Take 1 capsule by mouth Daily. 90 capsule 1 2/6/2024    Vibegron (Gemtesa) 75 MG tablet Take 1 tablet by mouth Daily. 90 tablet 1 2/6/2024    vitamin E 600 UNIT capsule vitamin E 600 unit oral capsule take 1 capsule by oral route daily   Active   2/6/2024       Allergies:  Patient has no known allergies.        Objective     Blood pressure 150/88, pulse 96, temperature 98.5 °F (36.9 °C), temperature source Temporal, resp. rate 18, height 182.9 cm (72\"), weight 130 kg (285 lb 11.5 oz), SpO2 95%.    Physical Exam   Constitutional: Pt is oriented to person, place, and time and well-developed, well-nourished, and in no distress.   Mouth/Throat: Oropharynx is clear and moist.   Neck: Normal range of motion.   Cardiovascular: Normal rate, regular rhythm and normal heart sounds.    Pulmonary/Chest: Effort normal and breath sounds normal.   Abdominal: Soft. Nontender  Skin: Skin is warm and dry.   Psychiatric: Mood, memory, affect and judgment normal.     Assessment & Plan     Diagnosis:  Surveillance for colon polyp    Anticipated Surgical Procedure:  Colonoscopy    The risks, benefits, and alternatives of this procedure have been discussed with the patient or the responsible party- the patient understands and agrees to proceed.            "

## 2024-02-07 NOTE — ANESTHESIA POSTPROCEDURE EVALUATION
Patient: Arley Falk    Procedure Summary       Date: 02/07/24 Room / Location: [facility] ENDOSCOPY 3 / [facility] ENDOSCOPY    Anesthesia Start: 0846 Anesthesia Stop: 0922    Procedure: COLONOSCOPY WITH POLYPECTOMY Diagnosis:       History of colon polyps      (History of colon polyps [Z86.010])    Surgeons: Gary Cruz MD Provider: Trey Wang CRNA    Anesthesia Type: general ASA Status: 3            Anesthesia Type: general    Vitals  Vitals Value Taken Time   /89 02/07/24 0936   Temp 36.3 °C (97.4 °F) 02/07/24 0936   Pulse 73 02/07/24 0938   Resp 16 02/07/24 0936   SpO2 98 % 02/07/24 0938   Vitals shown include unfiled device data.        Post Anesthesia Care and Evaluation    Patient location during evaluation: bedside  Patient participation: complete - patient participated  Level of consciousness: awake  Pain management: adequate    Airway patency: patent  Anesthetic complications: No anesthetic complications  PONV Status: controlled  Cardiovascular status: acceptable and stable  Respiratory status: acceptable

## 2024-02-08 LAB
CYTO UR: NORMAL
LAB AP CASE REPORT: NORMAL
LAB AP CLINICAL INFORMATION: NORMAL
PATH REPORT.FINAL DX SPEC: NORMAL
PATH REPORT.GROSS SPEC: NORMAL

## 2024-02-27 ENCOUNTER — OFFICE VISIT (OUTPATIENT)
Dept: FAMILY MEDICINE CLINIC | Facility: CLINIC | Age: 74
End: 2024-02-27
Payer: MEDICARE

## 2024-02-27 VITALS
WEIGHT: 282 LBS | OXYGEN SATURATION: 99 % | TEMPERATURE: 98 F | BODY MASS INDEX: 42.74 KG/M2 | HEIGHT: 68 IN | DIASTOLIC BLOOD PRESSURE: 70 MMHG | HEART RATE: 96 BPM | SYSTOLIC BLOOD PRESSURE: 102 MMHG

## 2024-02-27 DIAGNOSIS — R35.0 URINARY FREQUENCY: Primary | ICD-10-CM

## 2024-02-27 DIAGNOSIS — N30.01 ACUTE CYSTITIS WITH HEMATURIA: ICD-10-CM

## 2024-02-27 LAB
BILIRUB BLD-MCNC: NEGATIVE MG/DL
CLARITY, POC: ABNORMAL
COLOR UR: YELLOW
GLUCOSE UR STRIP-MCNC: NEGATIVE MG/DL
KETONES UR QL: NEGATIVE
LEUKOCYTE EST, POC: ABNORMAL
NITRITE UR-MCNC: POSITIVE MG/ML
PH UR: 6 [PH] (ref 5–8)
PROT UR STRIP-MCNC: ABNORMAL MG/DL
RBC # UR STRIP: ABNORMAL /UL
SP GR UR: 1.01 (ref 1–1.03)
UROBILINOGEN UR QL: ABNORMAL

## 2024-02-27 PROCEDURE — 99213 OFFICE O/P EST LOW 20 MIN: CPT | Performed by: FAMILY MEDICINE

## 2024-02-27 PROCEDURE — 1160F RVW MEDS BY RX/DR IN RCRD: CPT | Performed by: FAMILY MEDICINE

## 2024-02-27 PROCEDURE — 3078F DIAST BP <80 MM HG: CPT | Performed by: FAMILY MEDICINE

## 2024-02-27 PROCEDURE — 87186 SC STD MICRODIL/AGAR DIL: CPT | Performed by: FAMILY MEDICINE

## 2024-02-27 PROCEDURE — 87088 URINE BACTERIA CULTURE: CPT | Performed by: FAMILY MEDICINE

## 2024-02-27 PROCEDURE — 81002 URINALYSIS NONAUTO W/O SCOPE: CPT | Performed by: FAMILY MEDICINE

## 2024-02-27 PROCEDURE — 87086 URINE CULTURE/COLONY COUNT: CPT | Performed by: FAMILY MEDICINE

## 2024-02-27 PROCEDURE — 1159F MED LIST DOCD IN RCRD: CPT | Performed by: FAMILY MEDICINE

## 2024-02-27 PROCEDURE — 3074F SYST BP LT 130 MM HG: CPT | Performed by: FAMILY MEDICINE

## 2024-02-27 RX ORDER — NITROFURANTOIN 25; 75 MG/1; MG/1
100 CAPSULE ORAL 2 TIMES DAILY
Qty: 14 CAPSULE | Refills: 0 | Status: SHIPPED | OUTPATIENT
Start: 2024-02-27 | End: 2024-03-05

## 2024-02-27 NOTE — PROGRESS NOTES
"Chief Complaint  Nausea and Fatigue (Nausea and fatigue. Loss of appetite/)    Subjective      History of Present Illness    Arley Falk is a 73 y.o. male who presents to [facility]     Dark urine, frequent urination, lack of appetite recently.  Patient is concerned about a UTI.  He had a UTI about a month and a half ago which made him feel similarly.  At that time urine culture grew E. coli which was resistant to gentamicin, levofloxacin, tobramycin, and Bactrim.  We treated it with nitrofurantoin at that time.  Most recent GFR was 81.4 at the end of October. He has an upcoming appointment with Urology in March to discuss the frequent UTIs.    Objective   Vital Signs:   Vitals:    02/27/24 0935   BP: 102/70   Pulse: 96   Temp: 98 °F (36.7 °C)   SpO2: 99%   Weight: 128 kg (282 lb)   Height: 172.7 cm (68\")     Body mass index is 42.88 kg/m².    Wt Readings from Last 3 Encounters:   02/27/24 128 kg (282 lb)   02/07/24 130 kg (285 lb 11.5 oz)   01/11/24 129 kg (283 lb 4.8 oz)     BP Readings from Last 3 Encounters:   02/27/24 102/70   02/07/24 117/89   01/11/24 146/76       Health Maintenance   Topic Date Due    ZOSTER VACCINE (1 of 2) Never done    RSV Vaccine - Adults (1 - 1-dose 60+ series) Never done    COVID-19 Vaccine (6 - 2023-24 season) 09/01/2023    ANNUAL WELLNESS VISIT  05/02/2024    BMI FOLLOWUP  05/02/2024    LIPID PANEL  10/31/2024    COLORECTAL CANCER SCREENING  02/07/2029    TDAP/TD VACCINES (2 - Td or Tdap) 02/25/2031    HEPATITIS C SCREENING  Completed    INFLUENZA VACCINE  Completed    Pneumococcal Vaccine 65+  Completed    AAA SCREEN (ONE-TIME)  Completed       Physical Exam  Vitals and nursing note reviewed.   Constitutional:       General: He is not in acute distress.  Cardiovascular:      Rate and Rhythm: Normal rate and regular rhythm.      Heart sounds: Normal heart sounds.   Pulmonary:      Effort: Pulmonary effort is normal. No respiratory " distress.      Breath sounds: Normal breath sounds.   Abdominal:      General: There is no distension.      Palpations: There is no mass.      Tenderness: There is no abdominal tenderness. There is no right CVA tenderness or left CVA tenderness.   Neurological:      Mental Status: He is alert.   Psychiatric:         Mood and Affect: Mood normal.         Behavior: Behavior normal.          Result Review :  The following data was reviewed by: Laron Bryan MD on 02/27/2024:       Comprehensive Metabolic Panel (10/31/2023 09:50)   Urine Culture - Urine, Urine, Clean Catch (01/11/2024 17:19)   Office Visit on 02/27/2024   Component Date Value    Color 02/27/2024 Yellow     Clarity, UA 02/27/2024 Cloudy (A)     Glucose, UA 02/27/2024 Negative     Bilirubin 02/27/2024 Negative     Ketones, UA 02/27/2024 Negative     Specific Gravity  02/27/2024 1.015     Blood, UA 02/27/2024 Large (A)     pH, Urine 02/27/2024 6.0     Protein, POC 02/27/2024 Trace (A)     Urobilinogen, UA 02/27/2024 0.2 E.U./dL     Leukocytes 02/27/2024 Large (3+) (A)     Nitrite, UA 02/27/2024 Positive (A)    Admission on 02/07/2024, Discharged on 02/07/2024   Component Date Value    Case Report 02/07/2024                      Value:Surgical Pathology Report                         Case: [id number]                                  Authorizing Provider:  Gary Cruz MD    Collected:           02/07/2024 09:12 AM          Ordering Location:     [facility] Received:            02/07/2024 12:42 PM                                 SUITES                                                                       Pathologist:           Peace Menjivar DO                                                       Specimen:    Large Intestine, Left / Descending Colon, DESCENDING COLON POLYP                           Clinical Information 02/07/2024                      Value:This result contains rich text formatting which cannot be displayed  here.    Final Diagnosis 02/07/2024                      Value:This result contains rich text formatting which cannot be displayed here.    Gross Description 02/07/2024                      Value:This result contains rich text formatting which cannot be displayed here.    Microscopic Description 02/07/2024                      Value:This result contains rich text formatting which cannot be displayed here.       Procedures          Assessment & Plan  Urinary frequency  Suspected UTI based on symptoms and urine dip.  Reviewed past urine cultures if available.  Will treat with antibiotic and send urine for culture.    Acute cystitis with hematuria      Orders Placed This Encounter   Procedures    Urine Culture - Urine, Urine, Clean Catch    POCT urinalysis dipstick, manual     New Medications Ordered This Visit   Medications    nitrofurantoin, macrocrystal-monohydrate, (Macrobid) 100 MG capsule     Sig: Take 1 capsule by mouth 2 (Two) Times a Day for 7 days.     Dispense:  14 capsule     Refill:  0                    FOLLOW UP  Return if symptoms worsen or fail to improve.  Patient was given instructions and counseling regarding his condition or for health maintenance advice. Please see specific information pulled into the AVS if appropriate.       Laron Bryan MD  02/27/24  09:53 EST    CURRENT & DISCONTINUED MEDICATIONS  Current Outpatient Medications   Medication Instructions    allopurinol (ZYLOPRIM) 300 mg, Oral, Daily    amLODIPine (NORVASC) 5 mg, Oral, Daily    celecoxib (CELEBREX) 200 mg, Oral, Daily    ferrous sulfate 325 mg, Oral, Daily With Breakfast    fish oil 2,000 mg, Oral, 2 Times Daily With Meals    furosemide (LASIX) 20 MG tablet 1 tablet, Oral, Daily    Gemtesa 75 mg, Oral, Daily    lisinopril-hydrochlorothiazide (PRINZIDE,ZESTORETIC) 20-12.5 MG per tablet 2 tablets, Oral, Daily    montelukast (SINGULAIR) 10 mg, Oral, Daily    nitrofurantoin (macrocrystal-monohydrate) (MACROBID) 100 mg, Oral,  2 Times Daily    Nutritional Supplements (NUTRITIONAL SUPPLEMENT PO) Oral, Crave Supplement     tamsulosin (FLOMAX) 0.4 mg, Oral, Daily    vitamin E 600 UNIT capsule vitamin E 600 unit oral capsule take 1 capsule by oral route daily   Active       There are no discontinued medications.

## 2024-02-29 LAB — BACTERIA SPEC AEROBE CULT: ABNORMAL

## 2024-04-29 ENCOUNTER — OFFICE VISIT (OUTPATIENT)
Dept: FAMILY MEDICINE CLINIC | Facility: CLINIC | Age: 74
End: 2024-04-29
Payer: MEDICARE

## 2024-04-29 VITALS
OXYGEN SATURATION: 96 % | WEIGHT: 293.7 LBS | TEMPERATURE: 97.5 F | BODY MASS INDEX: 44.66 KG/M2 | HEART RATE: 85 BPM | SYSTOLIC BLOOD PRESSURE: 138 MMHG | DIASTOLIC BLOOD PRESSURE: 85 MMHG

## 2024-04-29 DIAGNOSIS — R25.2 MUSCLE CRAMPS: Primary | ICD-10-CM

## 2024-04-29 DIAGNOSIS — N40.0 BENIGN PROSTATIC HYPERPLASIA WITHOUT LOWER URINARY TRACT SYMPTOMS: ICD-10-CM

## 2024-04-29 DIAGNOSIS — N32.81 OVERACTIVE BLADDER: ICD-10-CM

## 2024-04-29 DIAGNOSIS — E78.2 MIXED HYPERLIPIDEMIA: ICD-10-CM

## 2024-04-29 DIAGNOSIS — R73.03 PREDIABETES: ICD-10-CM

## 2024-04-29 DIAGNOSIS — J30.89 NON-SEASONAL ALLERGIC RHINITIS, UNSPECIFIED TRIGGER: ICD-10-CM

## 2024-04-29 DIAGNOSIS — M19.90 ARTHRITIS: ICD-10-CM

## 2024-04-29 DIAGNOSIS — I10 PRIMARY HYPERTENSION: ICD-10-CM

## 2024-04-29 DIAGNOSIS — M1A.9XX0 CHRONIC GOUT WITHOUT TOPHUS, UNSPECIFIED CAUSE, UNSPECIFIED SITE: ICD-10-CM

## 2024-04-29 LAB
ALBUMIN SERPL-MCNC: 4.8 G/DL (ref 3.5–5.2)
ALBUMIN/GLOB SERPL: 2.1 G/DL
ALP SERPL-CCNC: 68 U/L (ref 39–117)
ALT SERPL W P-5'-P-CCNC: 21 U/L (ref 1–41)
ANION GAP SERPL CALCULATED.3IONS-SCNC: 13 MMOL/L (ref 5–15)
AST SERPL-CCNC: 13 U/L (ref 1–40)
BASOPHILS # BLD AUTO: 0.03 10*3/MM3 (ref 0–0.2)
BASOPHILS NFR BLD AUTO: 0.4 % (ref 0–1.5)
BILIRUB SERPL-MCNC: 0.6 MG/DL (ref 0–1.2)
BUN SERPL-MCNC: 15 MG/DL (ref 8–23)
BUN/CREAT SERPL: 15.6 (ref 7–25)
CALCIUM SPEC-SCNC: 9.7 MG/DL (ref 8.6–10.5)
CHLORIDE SERPL-SCNC: 99 MMOL/L (ref 98–107)
CHOLEST SERPL-MCNC: 153 MG/DL (ref 0–200)
CK SERPL-CCNC: 51 U/L (ref 20–200)
CO2 SERPL-SCNC: 26 MMOL/L (ref 22–29)
CREAT SERPL-MCNC: 0.96 MG/DL (ref 0.76–1.27)
DEPRECATED RDW RBC AUTO: 46 FL (ref 37–54)
EGFRCR SERPLBLD CKD-EPI 2021: 82.4 ML/MIN/1.73
EOSINOPHIL # BLD AUTO: 0.15 10*3/MM3 (ref 0–0.4)
EOSINOPHIL NFR BLD AUTO: 2.1 % (ref 0.3–6.2)
ERYTHROCYTE [DISTWIDTH] IN BLOOD BY AUTOMATED COUNT: 13.5 % (ref 12.3–15.4)
GLOBULIN UR ELPH-MCNC: 2.3 GM/DL
GLUCOSE SERPL-MCNC: 134 MG/DL (ref 65–99)
HBA1C MFR BLD: 6.1 % (ref 4.8–5.6)
HCT VFR BLD AUTO: 40.3 % (ref 37.5–51)
HDLC SERPL-MCNC: 41 MG/DL (ref 40–60)
HGB BLD-MCNC: 13.5 G/DL (ref 13–17.7)
IMM GRANULOCYTES # BLD AUTO: 0.04 10*3/MM3 (ref 0–0.05)
IMM GRANULOCYTES NFR BLD AUTO: 0.6 % (ref 0–0.5)
LDLC SERPL CALC-MCNC: 84 MG/DL (ref 0–100)
LDLC/HDLC SERPL: 1.96 {RATIO}
LYMPHOCYTES # BLD AUTO: 1.22 10*3/MM3 (ref 0.7–3.1)
LYMPHOCYTES NFR BLD AUTO: 16.8 % (ref 19.6–45.3)
MAGNESIUM SERPL-MCNC: 2.1 MG/DL (ref 1.6–2.4)
MCH RBC QN AUTO: 31.1 PG (ref 26.6–33)
MCHC RBC AUTO-ENTMCNC: 33.5 G/DL (ref 31.5–35.7)
MCV RBC AUTO: 92.9 FL (ref 79–97)
MONOCYTES # BLD AUTO: 0.53 10*3/MM3 (ref 0.1–0.9)
MONOCYTES NFR BLD AUTO: 7.3 % (ref 5–12)
NEUTROPHILS NFR BLD AUTO: 5.3 10*3/MM3 (ref 1.7–7)
NEUTROPHILS NFR BLD AUTO: 72.8 % (ref 42.7–76)
NRBC BLD AUTO-RTO: 0 /100 WBC (ref 0–0.2)
PLATELET # BLD AUTO: 178 10*3/MM3 (ref 140–450)
PMV BLD AUTO: 10.7 FL (ref 6–12)
POTASSIUM SERPL-SCNC: 4.3 MMOL/L (ref 3.5–5.2)
PROT SERPL-MCNC: 7.1 G/DL (ref 6–8.5)
RBC # BLD AUTO: 4.34 10*6/MM3 (ref 4.14–5.8)
SODIUM SERPL-SCNC: 138 MMOL/L (ref 136–145)
T4 FREE SERPL-MCNC: 1.36 NG/DL (ref 0.93–1.7)
TRIGL SERPL-MCNC: 159 MG/DL (ref 0–150)
TSH SERPL DL<=0.05 MIU/L-ACNC: 2.79 UIU/ML (ref 0.27–4.2)
URATE SERPL-MCNC: 5.8 MG/DL (ref 3.4–7)
VLDLC SERPL-MCNC: 28 MG/DL (ref 5–40)
WBC NRBC COR # BLD AUTO: 7.27 10*3/MM3 (ref 3.4–10.8)

## 2024-04-29 PROCEDURE — 1160F RVW MEDS BY RX/DR IN RCRD: CPT | Performed by: FAMILY MEDICINE

## 2024-04-29 PROCEDURE — 80053 COMPREHEN METABOLIC PANEL: CPT | Performed by: FAMILY MEDICINE

## 2024-04-29 PROCEDURE — 84439 ASSAY OF FREE THYROXINE: CPT | Performed by: FAMILY MEDICINE

## 2024-04-29 PROCEDURE — 80061 LIPID PANEL: CPT | Performed by: FAMILY MEDICINE

## 2024-04-29 PROCEDURE — 85025 COMPLETE CBC W/AUTO DIFF WBC: CPT | Performed by: FAMILY MEDICINE

## 2024-04-29 PROCEDURE — 82550 ASSAY OF CK (CPK): CPT | Performed by: FAMILY MEDICINE

## 2024-04-29 PROCEDURE — 36415 COLL VENOUS BLD VENIPUNCTURE: CPT | Performed by: FAMILY MEDICINE

## 2024-04-29 PROCEDURE — 99214 OFFICE O/P EST MOD 30 MIN: CPT | Performed by: FAMILY MEDICINE

## 2024-04-29 PROCEDURE — 84443 ASSAY THYROID STIM HORMONE: CPT | Performed by: FAMILY MEDICINE

## 2024-04-29 PROCEDURE — 3079F DIAST BP 80-89 MM HG: CPT | Performed by: FAMILY MEDICINE

## 2024-04-29 PROCEDURE — 84550 ASSAY OF BLOOD/URIC ACID: CPT | Performed by: FAMILY MEDICINE

## 2024-04-29 PROCEDURE — 83735 ASSAY OF MAGNESIUM: CPT | Performed by: FAMILY MEDICINE

## 2024-04-29 PROCEDURE — 1159F MED LIST DOCD IN RCRD: CPT | Performed by: FAMILY MEDICINE

## 2024-04-29 PROCEDURE — 3075F SYST BP GE 130 - 139MM HG: CPT | Performed by: FAMILY MEDICINE

## 2024-04-29 PROCEDURE — G2211 COMPLEX E/M VISIT ADD ON: HCPCS | Performed by: FAMILY MEDICINE

## 2024-04-29 PROCEDURE — 83036 HEMOGLOBIN GLYCOSYLATED A1C: CPT | Performed by: FAMILY MEDICINE

## 2024-04-29 RX ORDER — CELECOXIB 200 MG/1
200 CAPSULE ORAL DAILY
Qty: 90 CAPSULE | Refills: 1 | Status: SHIPPED | OUTPATIENT
Start: 2024-04-29

## 2024-04-29 RX ORDER — VIBEGRON 75 MG/1
75 TABLET, FILM COATED ORAL DAILY
Qty: 90 TABLET | Refills: 1 | Status: SHIPPED | OUTPATIENT
Start: 2024-04-29

## 2024-04-29 RX ORDER — LISINOPRIL AND HYDROCHLOROTHIAZIDE 20; 12.5 MG/1; MG/1
2 TABLET ORAL DAILY
Qty: 180 TABLET | Refills: 1 | Status: SHIPPED | OUTPATIENT
Start: 2024-04-29

## 2024-04-29 RX ORDER — CHLORAL HYDRATE 500 MG
2000 CAPSULE ORAL 2 TIMES DAILY WITH MEALS
Qty: 360 CAPSULE | Refills: 1 | Status: SHIPPED | OUTPATIENT
Start: 2024-04-29

## 2024-04-29 RX ORDER — MONTELUKAST SODIUM 10 MG/1
10 TABLET ORAL DAILY
Qty: 90 TABLET | Refills: 1 | Status: SHIPPED | OUTPATIENT
Start: 2024-04-29

## 2024-04-29 RX ORDER — TAMSULOSIN HYDROCHLORIDE 0.4 MG/1
0.4 CAPSULE ORAL DAILY
Qty: 90 CAPSULE | Refills: 1 | Status: SHIPPED | OUTPATIENT
Start: 2024-04-29

## 2024-04-29 RX ORDER — OMEGA-3 FATTY ACIDS/FISH OIL 300-1000MG
2 CAPSULE ORAL 2 TIMES DAILY WITH MEALS
COMMUNITY
End: 2024-04-29

## 2024-04-29 RX ORDER — ALLOPURINOL 300 MG/1
300 TABLET ORAL DAILY
Qty: 90 TABLET | Refills: 1 | Status: SHIPPED | OUTPATIENT
Start: 2024-04-29

## 2024-04-29 RX ORDER — AMLODIPINE BESYLATE 5 MG/1
5 TABLET ORAL DAILY
Qty: 90 TABLET | Refills: 1 | Status: SHIPPED | OUTPATIENT
Start: 2024-04-29

## 2024-04-29 NOTE — PROGRESS NOTES
Chief Complaint  Hyperlipidemia and Hypertension    Subjective          Arley Falk presents to [facility]  History of Present Illness  Pt has chronic leg muscle spasms and pain with walking- pt could possibly benefit from physical therapy  Hyperlipidemia  This is a chronic problem. The current episode started more than 1 year ago. The problem is controlled. Recent lipid tests were reviewed and are variable. There are no known factors aggravating his hyperlipidemia. Pertinent negatives include no chest pain, focal sensory loss, focal weakness, leg pain, myalgias or shortness of breath. Current antihyperlipidemic treatment includes diet change. The current treatment provides moderate improvement of lipids. There are no compliance problems.  Risk factors for coronary artery disease include dyslipidemia, hypertension and male sex.   Hypertension  This is a chronic problem. The current episode started more than 1 year ago. The problem has been improved since onset. The problem is controlled. Pertinent negatives include no anxiety, blurred vision, chest pain, headaches, malaise/fatigue, neck pain, orthopnea, palpitations, peripheral edema, PND, shortness of breath or sweats. There are no associated agents to hypertension. Current antihypertension treatment includes ACE inhibitors, diuretics and calcium channel blockers. The current treatment provides significant improvement. There are no compliance problems.                 Objective   No Known Allergies  Immunization History   Administered Date(s) Administered    COVID-19 (MODERNA) 1st,2nd,3rd Dose Monovalent 02/25/2021, 03/25/2021, 11/02/2021    COVID-19 (MODERNA) BIVALENT 12+YRS 10/14/2022    COVID-19 (UNSPECIFIED) 11/02/2021    Flu Vaccine Quad PF >36MO 11/15/2019    Fluzone High-Dose 65+yrs 10/13/2022, 11/30/2023    Pneumococcal Conjugate 20-Valent (PCV20) 05/02/2023    Pneumococcal Polysaccharide (PPSV23) 03/18/2020     Tdap 2021     Past Medical History:   Diagnosis Date    Allergic     Gout     Hyperlipidemia     Hypertension     Sleep apnea       Past Surgical History:   Procedure Laterality Date    COLONOSCOPY  2020    COLONOSCOPY N/A 2024    Procedure: COLONOSCOPY WITH POLYPECTOMY;  Surgeon: Gary Cruz MD;  Location: [facility] ENDOSCOPY;  Service: Gastroenterology;  Laterality: N/A;  COLON POLYP, HEMORRHOIDS      Social History     Socioeconomic History    Marital status:    Tobacco Use    Smoking status: Former     Current packs/day: 0.00     Average packs/day: 1 pack/day for 50.0 years (50.0 ttl pk-yrs)     Types: Cigarettes     Start date:      Quit date: 10/18/1987     Years since quittin.5     Passive exposure: Never    Smokeless tobacco: Former     Quit date: 10/18/2017    Tobacco comments:     quit    Vaping Use    Vaping status: Never Used   Substance and Sexual Activity    Alcohol use: Yes     Alcohol/week: 2.0 standard drinks of alcohol     Types: 2 Cans of beer per week    Drug use: Never    Sexual activity: Yes     Partners: Female        Current Outpatient Medications:     allopurinol (ZYLOPRIM) 300 MG tablet, Take 1 tablet by mouth Daily., Disp: 90 tablet, Rfl: 1    amLODIPine (NORVASC) 5 MG tablet, Take 1 tablet by mouth Daily., Disp: 90 tablet, Rfl: 1    celecoxib (CeleBREX) 200 MG capsule, Take 1 capsule by mouth Daily., Disp: 90 capsule, Rfl: 1    ferrous sulfate 325 (65 FE) MG tablet, Take 1 tablet by mouth Daily With Breakfast., Disp: , Rfl:     furosemide (LASIX) 20 MG tablet, Take 1 tablet by mouth Daily., Disp: , Rfl:     lisinopril-hydrochlorothiazide (PRINZIDE,ZESTORETIC) 20-12.5 MG per tablet, Take 2 tablets by mouth Daily., Disp: 180 tablet, Rfl: 1    montelukast (SINGULAIR) 10 MG tablet, Take 1 tablet by mouth Daily., Disp: 90 tablet, Rfl: 1    Nutritional Supplements (NUTRITIONAL SUPPLEMENT PO), Take  by mouth. Crave Supplement, Disp: , Rfl:     Omega-3 Fatty  Acids (fish oil) 1000 MG capsule capsule, Take 2 capsules by mouth 2 (Two) Times a Day With Meals., Disp: 360 capsule, Rfl: 1    tamsulosin (FLOMAX) 0.4 MG capsule 24 hr capsule, Take 1 capsule by mouth Daily., Disp: 90 capsule, Rfl: 1    Vibegron (Gemtesa) 75 MG tablet, Take 1 tablet by mouth Daily., Disp: 90 tablet, Rfl: 1    vitamin E 600 UNIT capsule, vitamin E 600 unit oral capsule take 1 capsule by oral route daily   Active, Disp: , Rfl:    Family History   Problem Relation Age of Onset    Depression Mother     Diabetes Mother     Heart defect Father     Kidney disease Sister     Colon cancer Neg Hx           Vital Signs:   Vitals:    04/29/24 0821 04/29/24 0844   BP: 140/84 138/85   Pulse: 85    Temp: 97.5 °F (36.4 °C)    SpO2: 96%    Weight: 133 kg (293 lb 11.2 oz)        Review of Systems   Constitutional:  Negative for fatigue, fever and malaise/fatigue.   HENT:  Negative for sore throat.    Eyes:  Negative for blurred vision and visual disturbance.   Respiratory:  Negative for cough, chest tightness, shortness of breath and wheezing.    Cardiovascular:  Negative for chest pain, palpitations, orthopnea and PND.   Gastrointestinal:  Negative for abdominal pain, diarrhea, nausea and vomiting.   Musculoskeletal:  Negative for myalgias and neck pain.   Neurological:  Negative for focal weakness, light-headedness and headaches.      Physical Exam  Vitals reviewed.   Constitutional:       Appearance: Normal appearance. He is well-developed.   HENT:      Head: Normocephalic and atraumatic.      Right Ear: External ear normal.      Left Ear: External ear normal.      Mouth/Throat:      Pharynx: No oropharyngeal exudate.   Eyes:      Conjunctiva/sclera: Conjunctivae normal.      Pupils: Pupils are equal, round, and reactive to light.   Cardiovascular:      Rate and Rhythm: Normal rate and regular rhythm.      Pulses: Normal pulses.      Heart sounds: Normal heart sounds. No murmur heard.     No friction rub. No  gallop.   Pulmonary:      Effort: Pulmonary effort is normal.      Breath sounds: Normal breath sounds. No wheezing or rhonchi.   Abdominal:      General: Abdomen is flat. Bowel sounds are normal. There is no distension.      Palpations: Abdomen is soft. There is no mass.      Tenderness: There is no abdominal tenderness. There is no guarding or rebound.      Hernia: No hernia is present.   Musculoskeletal:         General: Normal range of motion.   Skin:     General: Skin is warm and dry.      Capillary Refill: Capillary refill takes less than 2 seconds.   Neurological:      General: No focal deficit present.      Mental Status: He is alert and oriented to person, place, and time.      Cranial Nerves: No cranial nerve deficit.   Psychiatric:         Mood and Affect: Mood and affect normal.         Behavior: Behavior normal.         Thought Content: Thought content normal.         Judgment: Judgment normal.        Result Review :   The following data was reviewed by: Alfred Church MD on 04/29/2024:  CMP          10/31/2023    09:50   CMP   Glucose 121    BUN 17    Creatinine 0.97    EGFR 81.4    Sodium 137    Potassium 4.0    Chloride 98    Calcium 9.4    Total Protein 7.0    Albumin 4.4    Globulin 2.6    Total Bilirubin 0.4    Alkaline Phosphatase 68    AST (SGOT) 18    ALT (SGPT) 20    Albumin/Globulin Ratio 1.7    BUN/Creatinine Ratio 17.5    Anion Gap 11.9      CBC          10/31/2023    09:50 11/10/2023    11:19   CBC   WBC 7.11  9.76    RBC 3.90  4.19    Hemoglobin 12.5  13.2    Hematocrit 35.7  37.8    MCV 91.5  90.2    MCH 32.1  31.5    MCHC 35.0  34.9    RDW 14.3  13.6    Platelets 199  190      Lipid Panel          10/31/2023    09:50   Lipid Panel   Total Cholesterol 175    Triglycerides 106    HDL Cholesterol 48    VLDL Cholesterol 19    LDL Cholesterol  108    LDL/HDL Ratio 2.20      TSH          10/31/2023    09:50   TSH   TSH 2.380      Most Recent A1C          10/31/2023    09:50   HGBA1C Most  Recent   Hemoglobin A1C 5.90      PSA          10/31/2023    09:50   PSA   PSA 2.750                Assessment and Plan    Diagnoses and all orders for this visit:    1. Muscle cramps (Primary)  -     Ambulatory Referral to Physical Therapy Evaluate and treat  -     CK  -     Magnesium    2. Chronic gout without tophus, unspecified cause, unspecified site  -     allopurinol (ZYLOPRIM) 300 MG tablet; Take 1 tablet by mouth Daily.  Dispense: 90 tablet; Refill: 1  -     Uric Acid    3. Primary hypertension  -     amLODIPine (NORVASC) 5 MG tablet; Take 1 tablet by mouth Daily.  Dispense: 90 tablet; Refill: 1  -     lisinopril-hydrochlorothiazide (PRINZIDE,ZESTORETIC) 20-12.5 MG per tablet; Take 2 tablets by mouth Daily.  Dispense: 180 tablet; Refill: 1  -     CBC Auto Differential  -     Comprehensive Metabolic Panel  -     Lipid Panel  -     TSH+Free T4    4. Arthritis  -     celecoxib (CeleBREX) 200 MG capsule; Take 1 capsule by mouth Daily.  Dispense: 90 capsule; Refill: 1    5. Non-seasonal allergic rhinitis, unspecified trigger  -     montelukast (SINGULAIR) 10 MG tablet; Take 1 tablet by mouth Daily.  Dispense: 90 tablet; Refill: 1    6. Mixed hyperlipidemia  -     Omega-3 Fatty Acids (fish oil) 1000 MG capsule capsule; Take 2 capsules by mouth 2 (Two) Times a Day With Meals.  Dispense: 360 capsule; Refill: 1  -     Lipid Panel    7. Benign prostatic hyperplasia without lower urinary tract symptoms  -     tamsulosin (FLOMAX) 0.4 MG capsule 24 hr capsule; Take 1 capsule by mouth Daily.  Dispense: 90 capsule; Refill: 1    8. Overactive bladder  -     Vibegron (Gemtesa) 75 MG tablet; Take 1 tablet by mouth Daily.  Dispense: 90 tablet; Refill: 1    9. Prediabetes  -     Hemoglobin A1c            Follow Up   Return in about 1 month (around 5/29/2024) for Recheck, Medicare Wellness.  Patient was given instructions and counseling regarding his condition or for health maintenance advice. Please see specific information  pulled into the AVS if appropriate.

## 2024-04-29 NOTE — PROGRESS NOTES
Venipuncture Blood Specimen Collection  Venipuncture performed in left arm  by Sandra Mcclain with good hemostasis. Patient tolerated the procedure well without complications.   04/29/24   Sandra Mcclain

## 2024-05-02 NOTE — PROGRESS NOTES
Labs show prediabetes is worsening.  Really watch diet and exercise.  If you have any questions or concerns please follow-up.

## 2024-05-30 ENCOUNTER — OFFICE VISIT (OUTPATIENT)
Dept: FAMILY MEDICINE CLINIC | Facility: CLINIC | Age: 74
End: 2024-05-30
Payer: MEDICARE

## 2024-05-30 VITALS
SYSTOLIC BLOOD PRESSURE: 148 MMHG | TEMPERATURE: 97.8 F | WEIGHT: 296.1 LBS | OXYGEN SATURATION: 96 % | BODY MASS INDEX: 44.88 KG/M2 | DIASTOLIC BLOOD PRESSURE: 90 MMHG | HEART RATE: 84 BPM | HEIGHT: 68 IN

## 2024-05-30 DIAGNOSIS — R73.03 PREDIABETES: ICD-10-CM

## 2024-05-30 DIAGNOSIS — Z00.00 MEDICARE ANNUAL WELLNESS VISIT, SUBSEQUENT: Primary | ICD-10-CM

## 2024-05-30 DIAGNOSIS — E78.2 MIXED HYPERLIPIDEMIA: ICD-10-CM

## 2024-05-30 PROCEDURE — 3077F SYST BP >= 140 MM HG: CPT | Performed by: FAMILY MEDICINE

## 2024-05-30 PROCEDURE — 1170F FXNL STATUS ASSESSED: CPT | Performed by: FAMILY MEDICINE

## 2024-05-30 PROCEDURE — G0439 PPPS, SUBSEQ VISIT: HCPCS | Performed by: FAMILY MEDICINE

## 2024-05-30 PROCEDURE — 96160 PT-FOCUSED HLTH RISK ASSMT: CPT | Performed by: FAMILY MEDICINE

## 2024-05-30 PROCEDURE — 1160F RVW MEDS BY RX/DR IN RCRD: CPT | Performed by: FAMILY MEDICINE

## 2024-05-30 PROCEDURE — 1159F MED LIST DOCD IN RCRD: CPT | Performed by: FAMILY MEDICINE

## 2024-05-30 PROCEDURE — 3080F DIAST BP >= 90 MM HG: CPT | Performed by: FAMILY MEDICINE

## 2024-05-30 NOTE — PROGRESS NOTES
The ABCs of the Annual Wellness Visit  Subsequent Medicare Wellness Visit    Subjective    Arley Falk is a 73 y.o. male who presents for a Subsequent Medicare Wellness Visit.    The following portions of the patient's history were reviewed and   updated as appropriate: He  has a past medical history of Allergic, Gout, Hyperlipidemia, Hypertension, and Sleep apnea.  He does not have any pertinent problems on file.  He  has a past surgical history that includes Colonoscopy (06/2020) and Colonoscopy (N/A, 2/7/2024).  His family history includes Depression in his mother; Diabetes in his mother; Heart defect in his father; Kidney disease in his sister.  He  reports that he quit smoking about 36 years ago. His smoking use included cigarettes. He started smoking about 57 years ago. He has a 50 pack-year smoking history. He has never been exposed to tobacco smoke. He quit smokeless tobacco use about 6 years ago. He reports current alcohol use of about 2.0 standard drinks of alcohol per week. He reports that he does not use drugs.  Current Outpatient Medications   Medication Sig Dispense Refill    allopurinol (ZYLOPRIM) 300 MG tablet Take 1 tablet by mouth Daily. 90 tablet 1    amLODIPine (NORVASC) 5 MG tablet Take 1 tablet by mouth Daily. 90 tablet 1    celecoxib (CeleBREX) 200 MG capsule Take 1 capsule by mouth Daily. 90 capsule 1    ferrous sulfate 325 (65 FE) MG tablet Take 1 tablet by mouth Daily With Breakfast.      furosemide (LASIX) 20 MG tablet Take 1 tablet by mouth Daily.      lisinopril-hydrochlorothiazide (PRINZIDE,ZESTORETIC) 20-12.5 MG per tablet Take 2 tablets by mouth Daily. 180 tablet 1    montelukast (SINGULAIR) 10 MG tablet Take 1 tablet by mouth Daily. 90 tablet 1    Nutritional Supplements (NUTRITIONAL SUPPLEMENT PO) Take  by mouth. Crave Supplement      Omega-3 Fatty Acids (fish oil) 1000 MG capsule capsule Take 2 capsules by mouth 2 (Two) Times a Day With Meals. 360 capsule 1    tamsulosin  (FLOMAX) 0.4 MG capsule 24 hr capsule Take 1 capsule by mouth Daily. 90 capsule 1    Vibegron (Gemtesa) 75 MG tablet Take 1 tablet by mouth Daily. 90 tablet 1    vitamin E 600 UNIT capsule vitamin E 600 unit oral capsule take 1 capsule by oral route daily   Active       No current facility-administered medications for this visit.     Current Outpatient Medications on File Prior to Visit   Medication Sig    allopurinol (ZYLOPRIM) 300 MG tablet Take 1 tablet by mouth Daily.    amLODIPine (NORVASC) 5 MG tablet Take 1 tablet by mouth Daily.    celecoxib (CeleBREX) 200 MG capsule Take 1 capsule by mouth Daily.    ferrous sulfate 325 (65 FE) MG tablet Take 1 tablet by mouth Daily With Breakfast.    furosemide (LASIX) 20 MG tablet Take 1 tablet by mouth Daily.    lisinopril-hydrochlorothiazide (PRINZIDE,ZESTORETIC) 20-12.5 MG per tablet Take 2 tablets by mouth Daily.    montelukast (SINGULAIR) 10 MG tablet Take 1 tablet by mouth Daily.    Nutritional Supplements (NUTRITIONAL SUPPLEMENT PO) Take  by mouth. Crave Supplement    Omega-3 Fatty Acids (fish oil) 1000 MG capsule capsule Take 2 capsules by mouth 2 (Two) Times a Day With Meals.    tamsulosin (FLOMAX) 0.4 MG capsule 24 hr capsule Take 1 capsule by mouth Daily.    Vibegron (Gemtesa) 75 MG tablet Take 1 tablet by mouth Daily.    vitamin E 600 UNIT capsule vitamin E 600 unit oral capsule take 1 capsule by oral route daily   Active     No current facility-administered medications on file prior to visit.     He has No Known Allergies..    Compared to one year ago, the patient feels his physical   health is the same.    Compared to one year ago, the patient feels his mental   health is the same.    Recent Hospitalizations:  He was admitted within the past 365 days at [facility].       Current Medical Providers:  Patient Care Team:  Alfred Church MD as PCP - General (Family Medicine)    Outpatient Medications Prior to Visit   Medication Sig  Dispense Refill    allopurinol (ZYLOPRIM) 300 MG tablet Take 1 tablet by mouth Daily. 90 tablet 1    amLODIPine (NORVASC) 5 MG tablet Take 1 tablet by mouth Daily. 90 tablet 1    celecoxib (CeleBREX) 200 MG capsule Take 1 capsule by mouth Daily. 90 capsule 1    ferrous sulfate 325 (65 FE) MG tablet Take 1 tablet by mouth Daily With Breakfast.      furosemide (LASIX) 20 MG tablet Take 1 tablet by mouth Daily.      lisinopril-hydrochlorothiazide (PRINZIDE,ZESTORETIC) 20-12.5 MG per tablet Take 2 tablets by mouth Daily. 180 tablet 1    montelukast (SINGULAIR) 10 MG tablet Take 1 tablet by mouth Daily. 90 tablet 1    Nutritional Supplements (NUTRITIONAL SUPPLEMENT PO) Take  by mouth. Crave Supplement      Omega-3 Fatty Acids (fish oil) 1000 MG capsule capsule Take 2 capsules by mouth 2 (Two) Times a Day With Meals. 360 capsule 1    tamsulosin (FLOMAX) 0.4 MG capsule 24 hr capsule Take 1 capsule by mouth Daily. 90 capsule 1    Vibegron (Gemtesa) 75 MG tablet Take 1 tablet by mouth Daily. 90 tablet 1    vitamin E 600 UNIT capsule vitamin E 600 unit oral capsule take 1 capsule by oral route daily   Active       No facility-administered medications prior to visit.       No opioid medication identified on active medication list. I have reviewed chart for other potential  high risk medication/s and harmful drug interactions in the elderly.        Aspirin is not on active medication list.  Aspirin use is not indicated based on review of current medical condition/s. Risk of harm outweighs potential benefits.  .    Patient Active Problem List   Diagnosis    Enlarged prostate    Gout    Hypercholesterolemia    Primary hypertension    Non-seasonal allergic rhinitis    Sleep apnea    Arthralgia of both knees    Limb swelling    Pes anserinus bursitis    Prostate disorder    Hearing loss    Pain of both shoulder joints    Former tobacco use    Class 3 severe obesity due to excess calories with serious comorbidity and body mass index  "(BMI) of 40.0 to 44.9 in adult    History of colon polyps    Mixed hyperlipidemia    Prediabetes     Advance Care Planning   Advance Care Planning     Advance Directive is not on file.  ACP discussion was held with the patient during this visit. Patient does not have an advance directive, information provided.     Objective    Vitals:    24 0806   BP: 148/90   Pulse: 84   Temp: 97.8 °F (36.6 °C)   SpO2: 96%   Weight: 134 kg (296 lb 1.6 oz)   Height: 172.7 cm (68\")     Estimated body mass index is 45.02 kg/m² as calculated from the following:    Height as of this encounter: 172.7 cm (68\").    Weight as of this encounter: 134 kg (296 lb 1.6 oz).           Does the patient have evidence of cognitive impairment? No    Lab Results   Component Value Date    TRIG 159 (H) 2024    HDL 41 2024    LDL 84 2024    VLDL 28 2024    HGBA1C 6.10 (H) 2024        HEALTH RISK ASSESSMENT    Smoking Status:  Social History     Tobacco Use   Smoking Status Former    Current packs/day: 0.00    Average packs/day: 1 pack/day for 50.0 years (50.0 ttl pk-yrs)    Types: Cigarettes    Start date:     Quit date: 10/18/1987    Years since quittin.6    Passive exposure: Never   Smokeless Tobacco Former    Quit date: 10/18/2017   Tobacco Comments    quit      Alcohol Consumption:  Social History     Substance and Sexual Activity   Alcohol Use Yes    Alcohol/week: 2.0 standard drinks of alcohol    Types: 2 Cans of beer per week     Fall Risk Screen:    STEADI Fall Risk Assessment was completed, and patient is at LOW risk for falls.Assessment completed on:2024    Depression Screenin/30/2024     8:00 AM   PHQ-2/PHQ-9 Depression Screening   Little Interest or Pleasure in Doing Things 0-->not at all   Feeling Down, Depressed or Hopeless 0-->not at all   PHQ-9: Brief Depression Severity Measure Score 0       Health Habits and Functional and Cognitive Screenin/30/2024     8:00 AM "   Functional & Cognitive Status   Do you have difficulty preparing food and eating? No   Do you have difficulty bathing yourself, getting dressed or grooming yourself? No   Do you have difficulty using the toilet? No   Do you have difficulty moving around from place to place? No   Do you have trouble with steps or getting out of a bed or a chair? No   Current Diet Well Balanced Diet   Dental Exam Up to date   Eye Exam Up to date   Exercise (times per week) 1 times per week   Current Exercises Include Walking   Do you need help using the phone?  No   Are you deaf or do you have serious difficulty hearing?  No   Do you need help to go to places out of walking distance? No   Do you need help shopping? No   Do you need help preparing meals?  No   Do you need help with housework?  No   Do you need help with laundry? No   Do you need help taking your medications? No   Do you need help managing money? No   Do you ever drive or ride in a car without wearing a seat belt? No   Have you felt unusual stress, anger or loneliness in the last month? No   Who do you live with? Spouse   If you need help, do you have trouble finding someone available to you? No   Have you been bothered in the last four weeks by sexual problems? No   Do you have difficulty concentrating, remembering or making decisions? No       Age-appropriate Screening Schedule:  Refer to the list below for future screening recommendations based on patient's age, sex and/or medical conditions. Orders for these recommended tests are listed in the plan section. The patient has been provided with a written plan.    Health Maintenance   Topic Date Due    ZOSTER VACCINE (1 of 2) Never done    RSV Vaccine - Adults (1 - 1-dose 60+ series) Never done    COVID-19 Vaccine (6 - 2023-24 season) 09/01/2023    INFLUENZA VACCINE  08/01/2024    LIPID PANEL  04/29/2025    BMI FOLLOWUP  04/29/2025    ANNUAL WELLNESS VISIT  05/30/2025    COLORECTAL CANCER SCREENING  02/07/2029     TDAP/TD VACCINES (2 - Td or Tdap) 02/25/2031    HEPATITIS C SCREENING  Completed    Pneumococcal Vaccine 65+  Completed    AAA SCREEN (ONE-TIME)  Completed                  CMS Preventative Services Quick Reference  Risk Factors Identified During Encounter  Immunizations Discussed/Encouraged: Shingrix and RSV (Respiratory Syncytial Virus)  Inactivity/Sedentary: Patient was advised to exercise at least 150 minutes a week per CDC recommendations.  The above risks/problems have been discussed with the patient.  Pertinent information has been shared with the patient in the After Visit Summary.  An After Visit Summary and PPPS were made available to the patient.    Follow Up:   Next Medicare Wellness visit to be scheduled in 1 year.       Additional E&M Note during same encounter follows:  Patient has multiple medical problems which are significant and separately identifiable that require additional work above and beyond the Medicare Wellness Visit.      Chief Complaint  Medicare Wellness-subsequent, Hypertension, Hyperlipidemia, and Prediabetes    Subjective        Pt will check BP in office to monitor it over next 1-2 weeks.  I gave pt written diet to follow.  Discussed labs  Cholesterol and BG going up- pt will work on diet and exercise          Review of Systems   Constitutional:  Negative for fatigue and fever.   HENT:  Negative for sore throat.    Eyes:  Negative for visual disturbance.   Respiratory:  Negative for cough, chest tightness, shortness of breath and wheezing.    Cardiovascular:  Negative for chest pain, palpitations and leg swelling.   Gastrointestinal:  Negative for abdominal pain, diarrhea, nausea and vomiting.   Neurological:  Negative for dizziness and light-headedness.   Psychiatric/Behavioral:  Negative for decreased concentration, dysphoric mood, sleep disturbance and suicidal ideas. The patient is not nervous/anxious.        Objective   Vital Signs:  /90   Pulse 84   Temp 97.8 °F (36.6  "°C)   Ht 172.7 cm (68\")   Wt 134 kg (296 lb 1.6 oz)   SpO2 96%   BMI 45.02 kg/m²     Physical Exam  Vitals reviewed.   Constitutional:       Appearance: Normal appearance. He is well-developed.   HENT:      Head: Normocephalic and atraumatic.      Right Ear: External ear normal.      Left Ear: External ear normal.      Mouth/Throat:      Pharynx: No oropharyngeal exudate.   Eyes:      Conjunctiva/sclera: Conjunctivae normal.      Pupils: Pupils are equal, round, and reactive to light.   Cardiovascular:      Rate and Rhythm: Normal rate and regular rhythm.      Pulses: Normal pulses.      Heart sounds: Normal heart sounds. No murmur heard.     No friction rub. No gallop.   Pulmonary:      Effort: Pulmonary effort is normal.      Breath sounds: Normal breath sounds. No wheezing or rhonchi.   Abdominal:      General: Abdomen is flat. Bowel sounds are normal. There is no distension.      Palpations: Abdomen is soft. There is no mass.      Tenderness: There is no abdominal tenderness. There is no guarding or rebound.      Hernia: No hernia is present.   Musculoskeletal:         General: Normal range of motion.   Skin:     General: Skin is warm and dry.      Capillary Refill: Capillary refill takes less than 2 seconds.   Neurological:      General: No focal deficit present.      Mental Status: He is alert and oriented to person, place, and time.      Cranial Nerves: No cranial nerve deficit.   Psychiatric:         Mood and Affect: Mood and affect normal.         Behavior: Behavior normal.         Thought Content: Thought content normal.         Judgment: Judgment normal.          The following data was reviewed by: Alfred Church MD on 05/30/2024:  CMP          10/31/2023    09:50 4/29/2024    09:01   CMP   Glucose 121  134    BUN 17  15    Creatinine 0.97  0.96    EGFR 81.4  82.4    Sodium 137  138    Potassium 4.0  4.3    Chloride 98  99    Calcium 9.4  9.7    Total Protein 7.0  7.1    Albumin 4.4  4.8    Globulin " 2.6  2.3    Total Bilirubin 0.4  0.6    Alkaline Phosphatase 68  68    AST (SGOT) 18  13    ALT (SGPT) 20  21    Albumin/Globulin Ratio 1.7  2.1    BUN/Creatinine Ratio 17.5  15.6    Anion Gap 11.9  13.0      CBC          10/31/2023    09:50 11/10/2023    11:19 4/29/2024    09:01   CBC   WBC 7.11  9.76  7.27    RBC 3.90  4.19  4.34    Hemoglobin 12.5  13.2  13.5    Hematocrit 35.7  37.8  40.3    MCV 91.5  90.2  92.9    MCH 32.1  31.5  31.1    MCHC 35.0  34.9  33.5    RDW 14.3  13.6  13.5    Platelets 199  190  178      Lipid Panel          10/31/2023    09:50 4/29/2024    09:01   Lipid Panel   Total Cholesterol 175  153    Triglycerides 106  159    HDL Cholesterol 48  41    VLDL Cholesterol 19  28    LDL Cholesterol  108  84    LDL/HDL Ratio 2.20  1.96      TSH          10/31/2023    09:50 4/29/2024    09:01   TSH   TSH 2.380  2.790      Most Recent A1C          4/29/2024    09:01   HGBA1C Most Recent   Hemoglobin A1C 6.10        PSA          10/31/2023    09:50   PSA   PSA 2.750                 Assessment and Plan   Diagnoses and all orders for this visit:    1. Medicare annual wellness visit, subsequent (Primary)    2. Mixed hyperlipidemia    3. Prediabetes      Gave written diet       Follow Up   Return in about 6 months (around 11/30/2024) for Recheck.  Patient was given instructions and counseling regarding his condition or for health maintenance advice. Please see specific information pulled into the AVS if appropriate.

## 2024-06-10 ENCOUNTER — CLINICAL SUPPORT (OUTPATIENT)
Dept: FAMILY MEDICINE CLINIC | Facility: CLINIC | Age: 74
End: 2024-06-10
Payer: MEDICARE

## 2024-06-10 VITALS — SYSTOLIC BLOOD PRESSURE: 130 MMHG | DIASTOLIC BLOOD PRESSURE: 72 MMHG

## 2024-06-10 DIAGNOSIS — I10 PRIMARY HYPERTENSION: Primary | ICD-10-CM

## 2024-08-29 ENCOUNTER — TRANSCRIBE ORDERS (OUTPATIENT)
Dept: ADMINISTRATIVE | Facility: HOSPITAL | Age: 74
End: 2024-08-29
Payer: MEDICARE

## 2024-08-29 DIAGNOSIS — C43.39 MELANOMA OF LEFT TEMPLE: Primary | ICD-10-CM

## 2024-09-06 ENCOUNTER — HOSPITAL ENCOUNTER (OUTPATIENT)
Dept: PET IMAGING | Facility: HOSPITAL | Age: 74
Discharge: HOME | End: 2024-09-06
Payer: MEDICARE

## 2024-09-06 DIAGNOSIS — C43.39 MELANOMA OF LEFT TEMPLE: ICD-10-CM

## 2024-09-06 PROCEDURE — 78816 PET IMAGE W/CT FULL BODY: CPT

## 2024-09-06 PROCEDURE — 0 FLUDEOXYGLUCOSE F18 SOLUTION: Performed by: OTOLARYNGOLOGY

## 2024-09-06 PROCEDURE — A9552 F18 FDG: HCPCS | Performed by: OTOLARYNGOLOGY

## 2024-09-06 RX ADMIN — FLUDEOXYGLUCOSE F 18 1 DOSE: 200 INJECTION, SOLUTION INTRAVENOUS at 12:50

## 2024-11-12 DIAGNOSIS — M1A.9XX0 CHRONIC GOUT WITHOUT TOPHUS, UNSPECIFIED CAUSE, UNSPECIFIED SITE: ICD-10-CM

## 2024-11-12 DIAGNOSIS — N40.0 BENIGN PROSTATIC HYPERPLASIA WITHOUT LOWER URINARY TRACT SYMPTOMS: ICD-10-CM

## 2024-11-12 DIAGNOSIS — M19.90 ARTHRITIS: ICD-10-CM

## 2024-11-12 DIAGNOSIS — I10 PRIMARY HYPERTENSION: ICD-10-CM

## 2024-11-12 DIAGNOSIS — J30.89 NON-SEASONAL ALLERGIC RHINITIS, UNSPECIFIED TRIGGER: ICD-10-CM

## 2024-11-12 RX ORDER — TAMSULOSIN HYDROCHLORIDE 0.4 MG/1
0.4 CAPSULE ORAL DAILY
Qty: 90 CAPSULE | Refills: 1 | OUTPATIENT
Start: 2024-11-12

## 2024-11-12 RX ORDER — LISINOPRIL AND HYDROCHLOROTHIAZIDE 12.5; 2 MG/1; MG/1
2 TABLET ORAL DAILY
Qty: 180 TABLET | Refills: 1 | OUTPATIENT
Start: 2024-11-12

## 2024-11-12 RX ORDER — CELECOXIB 200 MG/1
200 CAPSULE ORAL DAILY
Qty: 90 CAPSULE | Refills: 1 | OUTPATIENT
Start: 2024-11-12

## 2024-11-12 RX ORDER — MONTELUKAST SODIUM 10 MG/1
10 TABLET ORAL DAILY
Qty: 90 TABLET | Refills: 1 | OUTPATIENT
Start: 2024-11-12

## 2024-11-12 RX ORDER — AMLODIPINE BESYLATE 5 MG/1
5 TABLET ORAL DAILY
Qty: 90 TABLET | Refills: 1 | OUTPATIENT
Start: 2024-11-12

## 2024-11-12 RX ORDER — ALLOPURINOL 300 MG/1
300 TABLET ORAL DAILY
Qty: 90 TABLET | Refills: 1 | OUTPATIENT
Start: 2024-11-12

## 2024-11-12 NOTE — TELEPHONE ENCOUNTER
Caller: [address] [phone] [facility] [phone] FX    Relationship: Pharmacy    Requested Prescriptions:   Requested Prescriptions     Pending Prescriptions Disp Refills    allopurinol (ZYLOPRIM) 300 MG tablet 90 tablet 1     Sig: Take 1 tablet by mouth Daily.    amLODIPine (NORVASC) 5 MG tablet 90 tablet 1     Sig: Take 1 tablet by mouth Daily.    celecoxib (CeleBREX) 200 MG capsule 90 capsule 1     Sig: Take 1 capsule by mouth Daily.    lisinopril-hydrochlorothiazide (PRINZIDE,ZESTORETIC) 20-12.5 MG per tablet 180 tablet 1     Sig: Take 2 tablets by mouth Daily.    montelukast (SINGULAIR) 10 MG tablet 90 tablet 1     Sig: Take 1 tablet by mouth Daily.    tamsulosin (FLOMAX) 0.4 MG capsule 24 hr capsule 90 capsule 1     Sig: Take 1 capsule by mouth Daily.        Pharmacy where request should be sent: [address] [phone] [place] [phone] FX     Last office visit with prescribing clinician: 5/30/2024   Last telemedicine visit with prescribing clinician: Visit date not found   Next office visit with prescribing clinician: 12/2/2024     Does the patient have less than a 3 day supply:  [] Yes  [x] No    Dilshad Pedro Rep   11/12/24 14:21 EST

## 2024-11-18 DIAGNOSIS — E78.2 MIXED HYPERLIPIDEMIA: ICD-10-CM

## 2024-11-18 DIAGNOSIS — J30.89 NON-SEASONAL ALLERGIC RHINITIS, UNSPECIFIED TRIGGER: ICD-10-CM

## 2024-11-18 DIAGNOSIS — I10 PRIMARY HYPERTENSION: ICD-10-CM

## 2024-11-18 DIAGNOSIS — N40.0 BENIGN PROSTATIC HYPERPLASIA WITHOUT LOWER URINARY TRACT SYMPTOMS: ICD-10-CM

## 2024-11-18 DIAGNOSIS — M19.90 ARTHRITIS: ICD-10-CM

## 2024-11-18 DIAGNOSIS — M1A.9XX0 CHRONIC GOUT WITHOUT TOPHUS, UNSPECIFIED CAUSE, UNSPECIFIED SITE: ICD-10-CM

## 2024-11-18 DIAGNOSIS — N32.81 OVERACTIVE BLADDER: ICD-10-CM

## 2024-11-18 RX ORDER — CHLORAL HYDRATE 500 MG
2000 CAPSULE ORAL 2 TIMES DAILY WITH MEALS
Qty: 120 CAPSULE | Refills: 0 | Status: SHIPPED | OUTPATIENT
Start: 2024-11-18 | End: 2024-11-22 | Stop reason: SDUPTHER

## 2024-11-18 RX ORDER — MONTELUKAST SODIUM 10 MG/1
10 TABLET ORAL DAILY
Qty: 30 TABLET | Refills: 0 | Status: SHIPPED | OUTPATIENT
Start: 2024-11-18 | End: 2024-11-22 | Stop reason: SDUPTHER

## 2024-11-18 RX ORDER — TAMSULOSIN HYDROCHLORIDE 0.4 MG/1
0.4 CAPSULE ORAL DAILY
Qty: 90 CAPSULE | Refills: 1 | OUTPATIENT
Start: 2024-11-18

## 2024-11-18 RX ORDER — ALLOPURINOL 300 MG/1
300 TABLET ORAL DAILY
Qty: 30 TABLET | Refills: 0 | Status: SHIPPED | OUTPATIENT
Start: 2024-11-18 | End: 2024-11-22 | Stop reason: SDUPTHER

## 2024-11-18 RX ORDER — MONTELUKAST SODIUM 10 MG/1
10 TABLET ORAL DAILY
Qty: 90 TABLET | Refills: 1 | OUTPATIENT
Start: 2024-11-18

## 2024-11-18 RX ORDER — VIBEGRON 75 MG/1
75 TABLET, FILM COATED ORAL DAILY
Qty: 30 TABLET | Refills: 0 | Status: SHIPPED | OUTPATIENT
Start: 2024-11-18 | End: 2024-11-22 | Stop reason: SDUPTHER

## 2024-11-18 RX ORDER — ALLOPURINOL 300 MG/1
300 TABLET ORAL DAILY
Qty: 90 TABLET | Refills: 1 | OUTPATIENT
Start: 2024-11-18

## 2024-11-18 RX ORDER — LISINOPRIL AND HYDROCHLOROTHIAZIDE 12.5; 2 MG/1; MG/1
2 TABLET ORAL DAILY
Qty: 180 TABLET | Refills: 1 | OUTPATIENT
Start: 2024-11-18

## 2024-11-18 RX ORDER — TAMSULOSIN HYDROCHLORIDE 0.4 MG/1
0.4 CAPSULE ORAL DAILY
Qty: 30 CAPSULE | Refills: 0 | Status: SHIPPED | OUTPATIENT
Start: 2024-11-18 | End: 2024-11-22 | Stop reason: SDUPTHER

## 2024-11-18 RX ORDER — CELECOXIB 200 MG/1
200 CAPSULE ORAL DAILY
Qty: 30 CAPSULE | Refills: 0 | Status: SHIPPED | OUTPATIENT
Start: 2024-11-18

## 2024-11-18 RX ORDER — AMLODIPINE BESYLATE 5 MG/1
5 TABLET ORAL DAILY
Qty: 90 TABLET | Refills: 1 | OUTPATIENT
Start: 2024-11-18

## 2024-11-18 RX ORDER — AMLODIPINE BESYLATE 5 MG/1
5 TABLET ORAL DAILY
Qty: 30 TABLET | Refills: 0 | Status: SHIPPED | OUTPATIENT
Start: 2024-11-18 | End: 2024-11-22 | Stop reason: SDUPTHER

## 2024-11-18 RX ORDER — LISINOPRIL AND HYDROCHLOROTHIAZIDE 12.5; 2 MG/1; MG/1
2 TABLET ORAL DAILY
Qty: 60 TABLET | Refills: 0 | Status: SHIPPED | OUTPATIENT
Start: 2024-11-18 | End: 2024-11-22 | Stop reason: SDUPTHER

## 2024-11-18 RX ORDER — CELECOXIB 200 MG/1
200 CAPSULE ORAL DAILY
Qty: 90 CAPSULE | Refills: 1 | OUTPATIENT
Start: 2024-11-18

## 2024-11-18 NOTE — TELEPHONE ENCOUNTER
Caller: [address] [phone] [facility] [phone] FX    Relationship: Pharmacy      Requested Prescriptions:   Requested Prescriptions     Pending Prescriptions Disp Refills    allopurinol (ZYLOPRIM) 300 MG tablet 90 tablet 1     Sig: Take 1 tablet by mouth Daily.    amLODIPine (NORVASC) 5 MG tablet 90 tablet 1     Sig: Take 1 tablet by mouth Daily.    celecoxib (CeleBREX) 200 MG capsule 90 capsule 1     Sig: Take 1 capsule by mouth Daily.    lisinopril-hydrochlorothiazide (PRINZIDE,ZESTORETIC) 20-12.5 MG per tablet 180 tablet 1     Sig: Take 2 tablets by mouth Daily.    montelukast (SINGULAIR) 10 MG tablet 90 tablet 1     Sig: Take 1 tablet by mouth Daily.    tamsulosin (FLOMAX) 0.4 MG capsule 24 hr capsule 90 capsule 1     Sig: Take 1 capsule by mouth Daily.        Pharmacy where request should be sent: [address] [phone] [place] [phone] FX     Last office visit with prescribing clinician: 5/30/2024   Last telemedicine visit with prescribing clinician: Visit date not found   Next office visit with prescribing clinician: 12/2/2024       Dilshad Guerrero Rep   11/18/24 12:54 EST

## 2024-11-19 ENCOUNTER — TELEPHONE (OUTPATIENT)
Dept: FAMILY MEDICINE CLINIC | Facility: CLINIC | Age: 74
End: 2024-11-19

## 2024-11-19 NOTE — TELEPHONE ENCOUNTER
"  Caller: Arley Falk \"CARMINA\"    Relationship: Self    Best call back number: [phone]    What is the best time to reach you: ANYTIME    Who are you requesting to speak with (clinical staff, provider,  specific staff member): NURSE       What was the call regarding: PATIENT IS WANTING TO SEE IF HE CAN GET HIS MEDICATION SENT ON OVER TO PHARMACY THEY ARE GOING TO START PACKAGING THEM ON SATURDAY AND NEEDS THEM ALL THERE TO DO THAT.     "

## 2024-11-22 ENCOUNTER — OFFICE VISIT (OUTPATIENT)
Dept: FAMILY MEDICINE CLINIC | Facility: CLINIC | Age: 74
End: 2024-11-22
Payer: MEDICARE

## 2024-11-22 ENCOUNTER — TRANSCRIBE ORDERS (OUTPATIENT)
Dept: ADMINISTRATIVE | Facility: HOSPITAL | Age: 74
End: 2024-11-22
Payer: MEDICARE

## 2024-11-22 VITALS
OXYGEN SATURATION: 94 % | DIASTOLIC BLOOD PRESSURE: 80 MMHG | WEIGHT: 299.5 LBS | HEART RATE: 86 BPM | BODY MASS INDEX: 45.54 KG/M2 | SYSTOLIC BLOOD PRESSURE: 134 MMHG | TEMPERATURE: 98.1 F

## 2024-11-22 DIAGNOSIS — I10 PRIMARY HYPERTENSION: ICD-10-CM

## 2024-11-22 DIAGNOSIS — Z12.5 SCREENING PSA (PROSTATE SPECIFIC ANTIGEN): ICD-10-CM

## 2024-11-22 DIAGNOSIS — M1A.9XX0 CHRONIC GOUT WITHOUT TOPHUS, UNSPECIFIED CAUSE, UNSPECIFIED SITE: ICD-10-CM

## 2024-11-22 DIAGNOSIS — N40.0 BENIGN PROSTATIC HYPERPLASIA WITHOUT LOWER URINARY TRACT SYMPTOMS: ICD-10-CM

## 2024-11-22 DIAGNOSIS — M79.89 LEG SWELLING: Primary | ICD-10-CM

## 2024-11-22 DIAGNOSIS — E78.2 MIXED HYPERLIPIDEMIA: ICD-10-CM

## 2024-11-22 DIAGNOSIS — N32.81 OVERACTIVE BLADDER: ICD-10-CM

## 2024-11-22 DIAGNOSIS — R73.03 PREDIABETES: Primary | ICD-10-CM

## 2024-11-22 DIAGNOSIS — J30.89 NON-SEASONAL ALLERGIC RHINITIS, UNSPECIFIED TRIGGER: ICD-10-CM

## 2024-11-22 LAB
CHOLEST SERPL-MCNC: 171 MG/DL (ref 0–200)
HBA1C MFR BLD: 6.1 % (ref 4.8–5.6)
HDLC SERPL-MCNC: 35 MG/DL (ref 40–60)
LDLC SERPL CALC-MCNC: 109 MG/DL (ref 0–100)
LDLC/HDLC SERPL: 3.01 {RATIO}
PSA SERPL-MCNC: 3.31 NG/ML (ref 0–4)
T4 FREE SERPL-MCNC: 1.39 NG/DL (ref 0.92–1.68)
TRIGL SERPL-MCNC: 154 MG/DL (ref 0–150)
TSH SERPL DL<=0.05 MIU/L-ACNC: 2.88 UIU/ML (ref 0.27–4.2)
VLDLC SERPL-MCNC: 27 MG/DL (ref 5–40)

## 2024-11-22 PROCEDURE — 83036 HEMOGLOBIN GLYCOSYLATED A1C: CPT | Performed by: FAMILY MEDICINE

## 2024-11-22 PROCEDURE — 84443 ASSAY THYROID STIM HORMONE: CPT | Performed by: FAMILY MEDICINE

## 2024-11-22 PROCEDURE — G0103 PSA SCREENING: HCPCS | Performed by: FAMILY MEDICINE

## 2024-11-22 PROCEDURE — 80061 LIPID PANEL: CPT | Performed by: FAMILY MEDICINE

## 2024-11-22 PROCEDURE — 84439 ASSAY OF FREE THYROXINE: CPT | Performed by: FAMILY MEDICINE

## 2024-11-22 RX ORDER — ALLOPURINOL 300 MG/1
300 TABLET ORAL DAILY
Qty: 90 TABLET | Refills: 1 | Status: SHIPPED | OUTPATIENT
Start: 2024-11-22

## 2024-11-22 RX ORDER — CHLORAL HYDRATE 500 MG
2000 CAPSULE ORAL 2 TIMES DAILY WITH MEALS
Qty: 360 CAPSULE | Refills: 3 | Status: SHIPPED | OUTPATIENT
Start: 2024-11-22

## 2024-11-22 RX ORDER — VIBEGRON 75 MG/1
75 TABLET, FILM COATED ORAL DAILY
Qty: 90 TABLET | Refills: 1 | Status: SHIPPED | OUTPATIENT
Start: 2024-11-22

## 2024-11-22 RX ORDER — MONTELUKAST SODIUM 10 MG/1
10 TABLET ORAL DAILY
Qty: 90 TABLET | Refills: 1 | Status: SHIPPED | OUTPATIENT
Start: 2024-11-22

## 2024-11-22 RX ORDER — TAMSULOSIN HYDROCHLORIDE 0.4 MG/1
0.4 CAPSULE ORAL DAILY
Qty: 90 CAPSULE | Refills: 1 | Status: SHIPPED | OUTPATIENT
Start: 2024-11-22

## 2024-11-22 RX ORDER — AMLODIPINE BESYLATE 5 MG/1
5 TABLET ORAL DAILY
Qty: 90 TABLET | Refills: 1 | Status: SHIPPED | OUTPATIENT
Start: 2024-11-22

## 2024-11-22 RX ORDER — LISINOPRIL AND HYDROCHLOROTHIAZIDE 12.5; 2 MG/1; MG/1
2 TABLET ORAL DAILY
Qty: 180 TABLET | Refills: 1 | Status: SHIPPED | OUTPATIENT
Start: 2024-11-22

## 2024-11-22 NOTE — PROGRESS NOTES
..  Venipuncture Blood Specimen Collection  Venipuncture performed in Lt arm by Abby Plascencia MA with good hemostasis. Patient tolerated the procedure well without complications.   11/22/24   Abby Plascencia MA

## 2024-11-22 NOTE — PROGRESS NOTES
Chief Complaint  Hypertension, Hyperlipidemia, and Prediabetes    Subjective          Arley Falk presents to [facility]  History of Present Illness  Discussed R&B of med- pt has no h/o or FH of thyroid cancers or MEN.  Pt understands risk of chronic nausea, vomiting, pancreatitis, gallbladder problems.  Pt will call me and stop med if he has any nausea or vomiting.  Discussed risk of aspirations with anesthesia and need to be off med for 7-10 days before getting anesthesia.  Pt understands risks and wants to try med.                Objective   No Known Allergies  Immunization History   Administered Date(s) Administered    COVID-19 (MODERNA) 1st,2nd,3rd Dose Monovalent 2021, 2021, 2021    COVID-19 (MODERNA) BIVALENT 12+YRS 10/14/2022    COVID-19 (UNSPECIFIED) 2021    Flu Vaccine Quad PF >36MO 11/15/2019    Fluzone High-Dose 65+yrs 10/13/2022, 2023    Pneumococcal Conjugate 20-Valent (PCV20) 2023    Pneumococcal Polysaccharide (PPSV23) 2020    Tdap 2021     Past Medical History:   Diagnosis Date    Allergic     Gout     Hyperlipidemia     Hypertension     Sleep apnea       Past Surgical History:   Procedure Laterality Date    COLONOSCOPY  2020    COLONOSCOPY N/A 2024    Procedure: COLONOSCOPY WITH POLYPECTOMY;  Surgeon: Gary Cruz MD;  Location: [facility] END[facility];  Service: Gastroenterology;  Laterality: N/A;  COLON POLYP, HEMORRHOIDS      Social History     Socioeconomic History    Marital status:    Tobacco Use    Smoking status: Former     Current packs/day: 0.00     Average packs/day: 1 pack/day for 50.0 years (50.0 ttl pk-yrs)     Types: Cigarettes     Start date:      Quit date: 10/18/1987     Years since quittin.1     Passive exposure: Never    Smokeless tobacco: Former     Quit date: 10/18/2017    Tobacco comments:     quit    Vaping Use    Vaping status: Never Used   Substance and  Sexual Activity    Alcohol use: Yes     Alcohol/week: 2.0 standard drinks of alcohol     Types: 2 Cans of beer per week    Drug use: Never    Sexual activity: Yes     Partners: Female        Current Outpatient Medications:     allopurinol (ZYLOPRIM) 300 MG tablet, Take 1 tablet by mouth Daily., Disp: 90 tablet, Rfl: 1    amLODIPine (NORVASC) 5 MG tablet, Take 1 tablet by mouth Daily., Disp: 90 tablet, Rfl: 1    celecoxib (CeleBREX) 200 MG capsule, Take 1 capsule by mouth Daily., Disp: 30 capsule, Rfl: 0    ferrous sulfate 325 (65 FE) MG tablet, Take 1 tablet by mouth Daily With Breakfast., Disp: , Rfl:     furosemide (LASIX) 20 MG tablet, Take 1 tablet by mouth Daily., Disp: , Rfl:     lisinopril-hydrochlorothiazide (PRINZIDE,ZESTORETIC) 20-12.5 MG per tablet, Take 2 tablets by mouth Daily., Disp: 180 tablet, Rfl: 1    montelukast (SINGULAIR) 10 MG tablet, Take 1 tablet by mouth Daily., Disp: 90 tablet, Rfl: 1    Nutritional Supplements (NUTRITIONAL SUPPLEMENT PO), Take  by mouth. Crave Supplement, Disp: , Rfl:     Omega-3 Fatty Acids (fish oil) 1000 MG capsule capsule, Take 2 capsules by mouth 2 (Two) Times a Day With Meals., Disp: 360 capsule, Rfl: 3    tamsulosin (FLOMAX) 0.4 MG capsule 24 hr capsule, Take 1 capsule by mouth Daily., Disp: 90 capsule, Rfl: 1    Vibegron (Gemtesa) 75 MG tablet, Take 1 tablet by mouth Daily., Disp: 90 tablet, Rfl: 1    vitamin E 600 UNIT capsule, vitamin E 600 unit oral capsule take 1 capsule by oral route daily   Active, Disp: , Rfl:    Family History   Problem Relation Age of Onset    Depression Mother     Diabetes Mother     Heart defect Father     Kidney disease Sister     Colon cancer Neg Hx           Vital Signs:   Vitals:    11/22/24 0951   BP: 134/80   Pulse: 86   Temp: 98.1 °F (36.7 °C)   SpO2: 94%   Weight: 136 kg (299 lb 8 oz)       Review of Systems   Constitutional:  Negative for fatigue and fever.   HENT:  Negative for sore throat.    Eyes:  Negative for visual  disturbance.   Respiratory:  Negative for cough, chest tightness, shortness of breath and wheezing.    Cardiovascular:  Negative for chest pain, palpitations and leg swelling.   Gastrointestinal:  Negative for abdominal pain, diarrhea, nausea and vomiting.   Neurological:  Negative for dizziness, light-headedness and headaches.      Physical Exam  Vitals reviewed.   Constitutional:       Appearance: Normal appearance. He is well-developed.   HENT:      Head: Normocephalic and atraumatic.      Right Ear: External ear normal.      Left Ear: External ear normal.      Mouth/Throat:      Pharynx: No oropharyngeal exudate.   Eyes:      Conjunctiva/sclera: Conjunctivae normal.      Pupils: Pupils are equal, round, and reactive to light.   Cardiovascular:      Rate and Rhythm: Normal rate and regular rhythm.      Pulses: Normal pulses.      Heart sounds: Normal heart sounds. No murmur heard.     No friction rub. No gallop.   Pulmonary:      Effort: Pulmonary effort is normal.      Breath sounds: Normal breath sounds. No wheezing or rhonchi.   Abdominal:      General: Abdomen is flat. Bowel sounds are normal. There is no distension.      Palpations: Abdomen is soft. There is no mass.      Tenderness: There is no abdominal tenderness. There is no guarding or rebound.      Hernia: No hernia is present.   Musculoskeletal:         General: Normal range of motion.   Skin:     General: Skin is warm and dry.      Capillary Refill: Capillary refill takes less than 2 seconds.   Neurological:      General: No focal deficit present.      Mental Status: He is alert and oriented to person, place, and time.      Cranial Nerves: No cranial nerve deficit.   Psychiatric:         Mood and Affect: Mood and affect normal.         Behavior: Behavior normal.         Thought Content: Thought content normal.         Judgment: Judgment normal.        Result Review :   The following data was reviewed by: Alfred Church MD on 11/22/2024:  [facility]           4/29/2024    09:01   CMP   Glucose 134    BUN 15    Creatinine 0.96    EGFR 82.4    Sodium 138    Potassium 4.3    Chloride 99    Calcium 9.7    Total Protein 7.1    Albumin 4.8    Globulin 2.3    Total Bilirubin 0.6    Alkaline Phosphatase 68    AST (SGOT) 13    ALT (SGPT) 21    Albumin/Globulin Ratio 2.1    BUN/Creatinine Ratio 15.6    Anion Gap 13.0      CBC          4/29/2024    09:01 9/24/2024    08:36   CBC   WBC 7.27  8.15       RBC 4.34  4.27       Hemoglobin 13.5  13.3       Hematocrit 40.3  40.1       MCV 92.9  93.9       MCH 31.1  31.1       MCHC 33.5  33.2       RDW 13.5  13.1       Platelets 178  166          Details          This result is from an external source.             Lipid Panel          4/29/2024    09:01   Lipid Panel   Total Cholesterol 153    Triglycerides 159    HDL Cholesterol 41    VLDL Cholesterol 28    LDL Cholesterol  84    LDL/HDL Ratio 1.96      TSH          4/29/2024    09:01   TSH   TSH 2.790      Most Recent A1C          4/29/2024    09:01   HGBA1C Most Recent   Hemoglobin A1C 6.10                  Assessment and Plan    Diagnoses and all orders for this visit:    1. Prediabetes (Primary)  -     Hemoglobin A1c    2. Chronic gout without tophus, unspecified cause, unspecified site  -     allopurinol (ZYLOPRIM) 300 MG tablet; Take 1 tablet by mouth Daily.  Dispense: 90 tablet; Refill: 1    3. Primary hypertension  -     amLODIPine (NORVASC) 5 MG tablet; Take 1 tablet by mouth Daily.  Dispense: 90 tablet; Refill: 1  -     lisinopril-hydrochlorothiazide (PRINZIDE,ZESTORETIC) 20-12.5 MG per tablet; Take 2 tablets by mouth Daily.  Dispense: 180 tablet; Refill: 1  -     Cancel: CBC Auto Differential  -     Cancel: Comprehensive Metabolic Panel  -     Lipid Panel  -     TSH+Free T4    4. Non-seasonal allergic rhinitis, unspecified trigger  -     montelukast (SINGULAIR) 10 MG tablet; Take 1 tablet by mouth Daily.  Dispense: 90 tablet; Refill: 1    5. Mixed hyperlipidemia  -     Omega-3  Fatty Acids (fish oil) 1000 MG capsule capsule; Take 2 capsules by mouth 2 (Two) Times a Day With Meals.  Dispense: 360 capsule; Refill: 3    6. Benign prostatic hyperplasia without lower urinary tract symptoms  -     tamsulosin (FLOMAX) 0.4 MG capsule 24 hr capsule; Take 1 capsule by mouth Daily.  Dispense: 90 capsule; Refill: 1    7. Overactive bladder  -     Vibegron (Gemtesa) 75 MG tablet; Take 1 tablet by mouth Daily.  Dispense: 90 tablet; Refill: 1    8. Screening PSA (prostate specific antigen)  -     PSA Screen            Follow Up   Return in about 6 months (around 6/2/2025) for Recheck, Medicare Wellness.  Patient was given instructions and counseling regarding his condition or for health maintenance advice. Please see specific information pulled into the AVS if appropriate.

## 2024-12-23 DIAGNOSIS — M19.90 ARTHRITIS: ICD-10-CM

## 2024-12-23 RX ORDER — CELECOXIB 200 MG/1
200 CAPSULE ORAL DAILY
Qty: 30 CAPSULE | Refills: 0 | Status: SHIPPED | OUTPATIENT
Start: 2024-12-23

## 2024-12-23 NOTE — TELEPHONE ENCOUNTER
Pharmacy Name: [facility] - [place], [place] - [address] - [phone] [facility] [phone] FX         What medication are you calling in regards to: CELECOXIB    What question does the pharmacy have: REQUESTING A REFILL    Who is the provider that prescribed the medication: DR HARRIS    Additional notes:   DOING A COMPLIANCE PACK FOR PATIENT.  PHARMACY TRYING TO GET ALL HIS MEDICAITON

## 2025-01-22 DIAGNOSIS — M19.90 ARTHRITIS: ICD-10-CM

## 2025-01-22 NOTE — TELEPHONE ENCOUNTER
Caller: [facility] - [facility] [address] - [phone] [facility] [phone] FX    Relationship: Pharmacy    Best call back number: [phone]    Requested Prescriptions:   Requested Prescriptions     Pending Prescriptions Disp Refills    celecoxib (CeleBREX) 200 MG capsule 30 capsule 0     Sig: Take 1 capsule by mouth Daily.        Pharmacy where request should be sent: [facility] - [place] [address] - [phone] [facility] [phone] FX     Last office visit with prescribing clinician: 11/22/2024   Last telemedicine visit with prescribing clinician: Visit date not found   Next office visit with prescribing clinician: 6/3/2025       Dilshad Caceres Rep   01/22/25 15:32 EST

## 2025-01-23 RX ORDER — CELECOXIB 200 MG/1
200 CAPSULE ORAL DAILY
Qty: 30 CAPSULE | Refills: 0 | Status: SHIPPED | OUTPATIENT
Start: 2025-01-23

## 2025-02-13 ENCOUNTER — TRANSCRIBE ORDERS (OUTPATIENT)
Dept: ADMINISTRATIVE | Facility: HOSPITAL | Age: 75
End: 2025-02-13
Payer: MEDICARE

## 2025-02-13 DIAGNOSIS — C43.4 MELANOMA OF SCALP: Primary | ICD-10-CM

## 2025-02-25 ENCOUNTER — HOSPITAL ENCOUNTER (OUTPATIENT)
Dept: PET IMAGING | Facility: HOSPITAL | Age: 75
Discharge: HOME | End: 2025-02-25
Payer: MEDICARE

## 2025-02-25 DIAGNOSIS — C43.4 MELANOMA OF SCALP: ICD-10-CM

## 2025-02-25 PROCEDURE — 78816 PET IMAGE W/CT FULL BODY: CPT

## 2025-02-25 PROCEDURE — 34310000005 FLUDEOXYGLUCOSE F18 SOLUTION: Performed by: INTERNAL MEDICINE

## 2025-02-25 PROCEDURE — A9552 F18 FDG: HCPCS | Performed by: INTERNAL MEDICINE

## 2025-02-25 RX ADMIN — FLUDEOXYGLUCOSE F 18 1 DOSE: 200 INJECTION, SOLUTION INTRAVENOUS at 08:04

## 2025-02-26 DIAGNOSIS — M19.90 ARTHRITIS: ICD-10-CM

## 2025-02-26 NOTE — TELEPHONE ENCOUNTER
Caller: [facility] [address] - [phone] [facility] [phone] FX    Relationship: Pharmacy    Best call back number: [phone]     Requested Prescriptions:   Requested Prescriptions     Pending Prescriptions Disp Refills    celecoxib (CeleBREX) 200 MG capsule 30 capsule 0     Sig: Take 1 capsule by mouth Daily.        Pharmacy where request should be sent: [place] [address] [address] - [phone] [facility] [phone] FX     Last office visit with prescribing clinician: 11/22/2024   Last telemedicine visit with prescribing clinician: Visit date not found   Next office visit with prescribing clinician: 6/3/2025     Additional details provided by patient:     Does the patient have less than a 3 day supply:  [] Yes  [] No    Would you like a call back once the refill request has been completed: [] Yes [] No    If the office needs to give you a call back, can they leave a voicemail: [] Yes [] No    Dilshad Samuels Rep   02/26/25 12:52 EST

## 2025-02-27 RX ORDER — CELECOXIB 200 MG/1
200 CAPSULE ORAL DAILY
Qty: 30 CAPSULE | Refills: 0 | Status: SHIPPED | OUTPATIENT
Start: 2025-02-27

## 2025-03-18 ENCOUNTER — OFFICE VISIT (OUTPATIENT)
Dept: FAMILY MEDICINE CLINIC | Facility: CLINIC | Age: 75
End: 2025-03-18
Payer: MEDICARE

## 2025-03-18 VITALS
BODY MASS INDEX: 44.56 KG/M2 | TEMPERATURE: 97.9 F | HEIGHT: 68 IN | HEART RATE: 87 BPM | DIASTOLIC BLOOD PRESSURE: 70 MMHG | WEIGHT: 294 LBS | SYSTOLIC BLOOD PRESSURE: 120 MMHG | OXYGEN SATURATION: 97 %

## 2025-03-18 DIAGNOSIS — M19.90 ARTHRITIS: ICD-10-CM

## 2025-03-18 DIAGNOSIS — E66.813 CLASS 3 SEVERE OBESITY DUE TO EXCESS CALORIES WITH SERIOUS COMORBIDITY AND BODY MASS INDEX (BMI) OF 40.0 TO 44.9 IN ADULT: ICD-10-CM

## 2025-03-18 DIAGNOSIS — Z23 NEED FOR VIRAL IMMUNIZATION: ICD-10-CM

## 2025-03-18 DIAGNOSIS — E66.01 CLASS 3 SEVERE OBESITY DUE TO EXCESS CALORIES WITH SERIOUS COMORBIDITY AND BODY MASS INDEX (BMI) OF 40.0 TO 44.9 IN ADULT: ICD-10-CM

## 2025-03-18 DIAGNOSIS — R60.0 EDEMA OF BOTH LOWER LEGS: ICD-10-CM

## 2025-03-18 DIAGNOSIS — I10 PRIMARY HYPERTENSION: Primary | ICD-10-CM

## 2025-03-18 RX ORDER — TIRZEPATIDE 2.5 MG/.5ML
INJECTION, SOLUTION SUBCUTANEOUS
COMMUNITY
Start: 2025-03-16 | End: 2025-03-18

## 2025-03-18 RX ORDER — HYDRALAZINE HYDROCHLORIDE 10 MG/1
10 TABLET, FILM COATED ORAL 3 TIMES DAILY
Qty: 90 TABLET | Refills: 1 | Status: SHIPPED | OUTPATIENT
Start: 2025-03-18

## 2025-03-18 RX ORDER — FUROSEMIDE 40 MG/1
40 TABLET ORAL DAILY
COMMUNITY
Start: 2025-03-12

## 2025-03-18 RX ORDER — TIRZEPATIDE 2.5 MG/.5ML
2.5 INJECTION, SOLUTION SUBCUTANEOUS WEEKLY
Qty: 6 ML | Refills: 1 | Status: SHIPPED | OUTPATIENT
Start: 2025-03-18

## 2025-03-18 NOTE — PROGRESS NOTES
Chief Complaint  Edema (Wants to discuss medications, possibly stopping some to decrease edema in lower extremities)    Subjective          Arley Falk presents to [facility]  History of Present Illness  Pt has had infusions for melanoma- has had swelling of both lower legs  Pt will stop amlodipine and start hydralazine  Pt has been having swelling of both lower legs- pt has been off of allopurinol and celebrex for 6 days- no relief- pt was supposed to be off celebrex and norvasc- he will stop norvasc and restart allopurinol and celebrex.  Pt understands side effects of mounjaro and wants to try the med  Discussed R&B of med- pt has no h/o or FH of thyroid cancers or MEN.  Pt understands risk of chronic nausea, vomiting, pancreatitis, gallbladder problems, and possible hearing loss.  Pt will call me and stop med if he has any nausea or vomiting.  Discussed risk of aspirations with anesthesia and need to be off med for 7-10 days before getting anesthesia.  Pt understands risks and wants to try med.                 Objective   No Known Allergies  Immunization History   Administered Date(s) Administered    COVID-19 (MODERNA) 1st,2nd,3rd Dose Monovalent 02/25/2021, 03/25/2021, 11/02/2021    COVID-19 (MODERNA) BIVALENT 12+YRS 10/14/2022    COVID-19 (UNSPECIFIED) 11/02/2021    Flu Vaccine Quad PF >36MO 11/15/2019    Fluzone High-Dose 65+yrs 10/13/2022, 11/30/2023    Pneumococcal Conjugate 20-Valent (PCV20) 05/02/2023    Pneumococcal Polysaccharide (PPSV23) 03/18/2020    Tdap 02/25/2021     Past Medical History:   Diagnosis Date    Allergic     Gout     Hyperlipidemia     Hypertension     Sleep apnea       Past Surgical History:   Procedure Laterality Date    COLONOSCOPY  06/2020    COLONOSCOPY N/A 2/7/2024    Procedure: COLONOSCOPY WITH POLYPECTOMY;  Surgeon: Gary Cruz MD;  Location: [facility] ENDOSCOPY;  Service: Gastroenterology;  Laterality: N/A;  COLON POLYP,  HEMORRHOIDS      Social History     Socioeconomic History    Marital status:    Tobacco Use    Smoking status: Former     Current packs/day: 0.00     Average packs/day: 1 pack/day for 50.0 years (50.0 ttl pk-yrs)     Types: Cigarettes     Start date:      Quit date: 10/18/1987     Years since quittin.4     Passive exposure: Never    Smokeless tobacco: Former     Quit date: 10/18/2017    Tobacco comments:     quit    Vaping Use    Vaping status: Never Used   Substance and Sexual Activity    Alcohol use: Yes     Alcohol/week: 2.0 standard drinks of alcohol     Types: 2 Cans of beer per week    Drug use: Never    Sexual activity: Yes     Partners: Female        Current Outpatient Medications:     furosemide (LASIX) 40 MG tablet, Take 1 tablet by mouth Daily., Disp: , Rfl:     lisinopril-hydrochlorothiazide (PRINZIDE,ZESTORETIC) 20-12.5 MG per tablet, Take 2 tablets by mouth Daily., Disp: 180 tablet, Rfl: 1    montelukast (SINGULAIR) 10 MG tablet, Take 1 tablet by mouth Daily., Disp: 90 tablet, Rfl: 1    Mounjaro 2.5 MG/0.5ML solution auto-injector, Inject 2.5 mg under the skin into the appropriate area as directed 1 (One) Time Per Week., Disp: 6 mL, Rfl: 1    Omega-3 Fatty Acids (fish oil) 1000 MG capsule capsule, Take 2 capsules by mouth 2 (Two) Times a Day With Meals., Disp: 360 capsule, Rfl: 3    tamsulosin (FLOMAX) 0.4 MG capsule 24 hr capsule, Take 1 capsule by mouth Daily., Disp: 90 capsule, Rfl: 1    Vibegron (Gemtesa) 75 MG tablet, Take 1 tablet by mouth Daily., Disp: 90 tablet, Rfl: 1    vitamin E 600 UNIT capsule, vitamin E 600 unit oral capsule take 1 capsule by oral route daily   Active, Disp: , Rfl:     hydrALAZINE (APRESOLINE) 10 MG tablet, Take 1 tablet by mouth 3 (Three) Times a Day., Disp: 90 tablet, Rfl: 1   Family History   Problem Relation Age of Onset    Depression Mother     Diabetes Mother     Heart defect Father     Kidney disease Sister     Colon cancer Neg Hx      "      Vital Signs:   Vitals:    03/18/25 0904   BP: 120/70   Pulse: 87   Temp: 97.9 °F (36.6 °C)   SpO2: 97%   Weight: 133 kg (294 lb)   Height: 172.7 cm (68\")       Review of Systems   Constitutional:  Negative for fatigue and fever.   HENT:  Negative for sore throat.    Eyes:  Negative for visual disturbance.   Respiratory:  Negative for apnea, cough, choking, shortness of breath and wheezing.    Cardiovascular:  Positive for leg swelling. Negative for chest pain and palpitations.   Gastrointestinal:  Negative for abdominal pain, diarrhea, nausea and vomiting.   Neurological:  Negative for dizziness, light-headedness and headaches.      Physical Exam  Vitals reviewed.   Constitutional:       Appearance: Normal appearance. He is well-developed.   HENT:      Head: Normocephalic and atraumatic.      Right Ear: External ear normal.      Left Ear: External ear normal.      Mouth/Throat:      Pharynx: No oropharyngeal exudate.   Eyes:      Conjunctiva/sclera: Conjunctivae normal.      Pupils: Pupils are equal, round, and reactive to light.   Cardiovascular:      Rate and Rhythm: Normal rate and regular rhythm.      Pulses: Normal pulses.      Heart sounds: Normal heart sounds. No murmur heard.     No friction rub. No gallop.      Comments: 1+ pitting edema up to top of calves bilaterally    Pulmonary:      Effort: Pulmonary effort is normal.      Breath sounds: Normal breath sounds. No wheezing or rhonchi.   Abdominal:      General: Abdomen is flat. Bowel sounds are normal. There is no distension.      Palpations: Abdomen is soft. There is no mass.      Tenderness: There is no abdominal tenderness. There is no guarding or rebound.      Hernia: No hernia is present.   Musculoskeletal:         General: Normal range of motion.   Skin:     General: Skin is warm and dry.      Capillary Refill: Capillary refill takes less than 2 seconds.   Neurological:      General: No focal deficit present.      Mental Status: He is alert " and oriented to person, place, and time.      Cranial Nerves: No cranial nerve deficit.   Psychiatric:         Mood and Affect: Mood and affect normal.         Behavior: Behavior normal.         Thought Content: Thought content normal.         Judgment: Judgment normal.        Result Review :   The following data was reviewed by: Alfred Church MD on 03/18/2025:  CMP          4/29/2024    09:01   CMP   Glucose 134    BUN 15    Creatinine 0.96    EGFR 82.4    Sodium 138    Potassium 4.3    Chloride 99    Calcium 9.7    Total Protein 7.1    Albumin 4.8    Globulin 2.3    Total Bilirubin 0.6    Alkaline Phosphatase 68    AST (SGOT) 13    ALT (SGPT) 21    Albumin/Globulin Ratio 2.1    BUN/Creatinine Ratio 15.6    Anion Gap 13.0      CBC          4/29/2024    09:01 9/24/2024    08:36   CBC   WBC 7.27  8.15       RBC 4.34  4.27       Hemoglobin 13.5  13.3       Hematocrit 40.3  40.1       MCV 92.9  93.9       MCH 31.1  31.1       MCHC 33.5  33.2       RDW 13.5  13.1       Platelets 178  166          Details          This result is from an external source.             Lipid Panel          4/29/2024    09:01 11/22/2024    10:24   Lipid Panel   Total Cholesterol 153  171    Triglycerides 159  154    HDL Cholesterol 41  35    VLDL Cholesterol 28  27    LDL Cholesterol  84  109    LDL/HDL Ratio 1.96  3.01      TSH          4/29/2024    09:01 11/22/2024    10:24   TSH   TSH 2.790  2.880                Assessment and Plan    Diagnoses and all orders for this visit:    1. Primary hypertension (Primary)  -     hydrALAZINE (APRESOLINE) 10 MG tablet; Take 1 tablet by mouth 3 (Three) Times a Day.  Dispense: 90 tablet; Refill: 1    2. Edema of both lower legs  -     Compression Stockings    3. Arthritis  -     Mounjaro 2.5 MG/0.5ML solution auto-injector; Inject 2.5 mg under the skin into the appropriate area as directed 1 (One) Time Per Week.  Dispense: 6 mL; Refill: 1    4. Class 3 severe obesity due to excess calories with serious  comorbidity and body mass index (BMI) of 40.0 to 44.9 in adult  -     Mounjaro 2.5 MG/0.5ML solution auto-injector; Inject 2.5 mg under the skin into the appropriate area as directed 1 (One) Time Per Week.  Dispense: 6 mL; Refill: 1    5. Need for viral immunization  -     COVID-19 (Pfizer) 12yrs+ (COMIRNATY)  -     Fluzone High-Dose 65+yrs (5360-0704)            Follow Up   Return in about 2 weeks (around 4/1/2025) for Recheck.  Patient was given instructions and counseling regarding his condition or for health maintenance advice. Please see specific information pulled into the AVS if appropriate.

## 2025-04-01 ENCOUNTER — OFFICE VISIT (OUTPATIENT)
Dept: FAMILY MEDICINE CLINIC | Facility: CLINIC | Age: 75
End: 2025-04-01
Payer: MEDICARE

## 2025-04-01 VITALS
SYSTOLIC BLOOD PRESSURE: 122 MMHG | OXYGEN SATURATION: 97 % | WEIGHT: 293 LBS | HEART RATE: 83 BPM | TEMPERATURE: 98.2 F | BODY MASS INDEX: 44.41 KG/M2 | DIASTOLIC BLOOD PRESSURE: 76 MMHG | HEIGHT: 68 IN

## 2025-04-01 DIAGNOSIS — I10 PRIMARY HYPERTENSION: Primary | ICD-10-CM

## 2025-04-01 PROCEDURE — 1160F RVW MEDS BY RX/DR IN RCRD: CPT | Performed by: FAMILY MEDICINE

## 2025-04-01 PROCEDURE — 3074F SYST BP LT 130 MM HG: CPT | Performed by: FAMILY MEDICINE

## 2025-04-01 PROCEDURE — 1126F AMNT PAIN NOTED NONE PRSNT: CPT | Performed by: FAMILY MEDICINE

## 2025-04-01 PROCEDURE — 3078F DIAST BP <80 MM HG: CPT | Performed by: FAMILY MEDICINE

## 2025-04-01 PROCEDURE — 99213 OFFICE O/P EST LOW 20 MIN: CPT | Performed by: FAMILY MEDICINE

## 2025-04-01 PROCEDURE — 1159F MED LIST DOCD IN RCRD: CPT | Performed by: FAMILY MEDICINE

## 2025-04-01 RX ORDER — ALLOPURINOL 300 MG/1
1 TABLET ORAL DAILY
COMMUNITY
Start: 2025-03-19

## 2025-04-01 RX ORDER — CELECOXIB 200 MG/1
1 CAPSULE ORAL DAILY
COMMUNITY
Start: 2025-03-20

## 2025-04-01 RX ORDER — HYDRALAZINE HYDROCHLORIDE 10 MG/1
10 TABLET, FILM COATED ORAL 3 TIMES DAILY
Qty: 90 TABLET | Refills: 5 | Status: SHIPPED | OUTPATIENT
Start: 2025-04-01

## 2025-04-01 NOTE — PROGRESS NOTES
Chief Complaint  Edema (2 week follow up to edema in legs)    Subjective          Arley Falk presents to [facility]  History of Present Illness  Pt says compression stockings are doing well- no further edema with stockings    Pt doing well on mounjaro and tolerating med well- no nausea or vomiting  Hypertension  This is a chronic problem. The current episode started more than 1 year ago. The problem has been improved since onset. The problem is controlled. Pertinent negatives include no anxiety, blurred vision, chest pain, headaches, malaise/fatigue, neck pain, orthopnea, palpitations, peripheral edema, PND, shortness of breath or sweats. There are no associated agents to hypertension. Risk factors for coronary artery disease include dyslipidemia, family history and male gender. Current antihypertension treatment includes ACE inhibitors, diuretics and direct vasodilators. The current treatment provides significant improvement. There are no compliance problems.                 Objective   No Known Allergies  Immunization History   Administered Date(s) Administered    COVID-19 (MODERNA) 1st,2nd,3rd Dose Monovalent 02/25/2021, 03/25/2021, 11/02/2021    COVID-19 (MODERNA) BIVALENT 12+YRS 10/14/2022    COVID-19 (PFIZER) 12YRS+ (COMIRNATY) 03/18/2025    COVID-19 (UNSPECIFIED) 11/02/2021    Flu Vaccine Quad PF >36MO 11/15/2019    Fluzone High-Dose 65+YRS 03/18/2025    Fluzone High-Dose 65+yrs 10/13/2022, 11/30/2023    Pneumococcal Conjugate 20-Valent (PCV20) 05/02/2023    Pneumococcal Polysaccharide (PPSV23) 03/18/2020    Tdap 02/25/2021     Past Medical History:   Diagnosis Date    Allergic     Gout     Hyperlipidemia     Hypertension     Sleep apnea       Past Surgical History:   Procedure Laterality Date    COLONOSCOPY  06/2020    COLONOSCOPY N/A 2/7/2024    Procedure: COLONOSCOPY WITH POLYPECTOMY;  Surgeon: Gary Cruz MD;  Location: [facility];  Service:  Gastroenterology;  Laterality: N/A;  COLON POLYP, HEMORRHOIDS      Social History     Socioeconomic History    Marital status:    Tobacco Use    Smoking status: Former     Current packs/day: 0.00     Average packs/day: 1 pack/day for 50.0 years (50.0 ttl pk-yrs)     Types: Cigarettes     Start date:      Quit date: 10/18/1987     Years since quittin.4     Passive exposure: Never    Smokeless tobacco: Former     Quit date: 10/18/2017    Tobacco comments:     quit    Vaping Use    Vaping status: Never Used   Substance and Sexual Activity    Alcohol use: Yes     Alcohol/week: 2.0 standard drinks of alcohol     Types: 2 Cans of beer per week    Drug use: Never    Sexual activity: Yes     Partners: Female        Current Outpatient Medications:     allopurinol (ZYLOPRIM) 300 MG tablet, Take 1 tablet by mouth Daily., Disp: , Rfl:     celecoxib (CeleBREX) 200 MG capsule, Take 1 capsule by mouth Daily., Disp: , Rfl:     hydrALAZINE (APRESOLINE) 10 MG tablet, Take 1 tablet by mouth 3 (Three) Times a Day., Disp: 90 tablet, Rfl: 5    lisinopril-hydrochlorothiazide (PRINZIDE,ZESTORETIC) 20-12.5 MG per tablet, Take 2 tablets by mouth Daily., Disp: 180 tablet, Rfl: 1    montelukast (SINGULAIR) 10 MG tablet, Take 1 tablet by mouth Daily., Disp: 90 tablet, Rfl: 1    Mounjaro 2.5 MG/0.5ML solution auto-injector, Inject 2.5 mg under the skin into the appropriate area as directed 1 (One) Time Per Week., Disp: 6 mL, Rfl: 1    Omega-3 Fatty Acids (fish oil) 1000 MG capsule capsule, Take 2 capsules by mouth 2 (Two) Times a Day With Meals., Disp: 360 capsule, Rfl: 3    tamsulosin (FLOMAX) 0.4 MG capsule 24 hr capsule, Take 1 capsule by mouth Daily., Disp: 90 capsule, Rfl: 1    Vibegron (Gemtesa) 75 MG tablet, Take 1 tablet by mouth Daily., Disp: 90 tablet, Rfl: 1    vitamin E 600 UNIT capsule, vitamin E 600 unit oral capsule take 1 capsule by oral route daily   Active, Disp: , Rfl:    Family History   Problem Relation  "Age of Onset    Depression Mother     Diabetes Mother     Heart defect Father     Kidney disease Sister     Colon cancer Neg Hx           Vital Signs:   Vitals:    04/01/25 1144   BP: 122/76   Pulse: 83   Temp: 98.2 °F (36.8 °C)   SpO2: 97%   Weight: 133 kg (293 lb)   Height: 172.7 cm (68\")       Review of Systems   Constitutional:  Positive for fatigue. Negative for fever and malaise/fatigue.   HENT:  Negative for sore throat.    Eyes:  Negative for blurred vision and visual disturbance.   Respiratory:  Negative for apnea, cough, shortness of breath and wheezing.    Cardiovascular:  Negative for chest pain, palpitations, orthopnea, leg swelling and PND.   Gastrointestinal:  Negative for abdominal pain, diarrhea, nausea and vomiting.   Musculoskeletal:  Negative for neck pain.   Neurological:  Negative for dizziness, light-headedness and headaches.      Physical Exam  Vitals reviewed.   Constitutional:       Appearance: Normal appearance. He is well-developed.   HENT:      Head: Normocephalic and atraumatic.      Right Ear: External ear normal.      Left Ear: External ear normal.      Mouth/Throat:      Pharynx: No oropharyngeal exudate.   Eyes:      Conjunctiva/sclera: Conjunctivae normal.      Pupils: Pupils are equal, round, and reactive to light.   Cardiovascular:      Rate and Rhythm: Normal rate and regular rhythm.      Pulses: Normal pulses.      Heart sounds: Normal heart sounds. No murmur heard.     No friction rub. No gallop.      Comments: No edema today.  Pulmonary:      Effort: Pulmonary effort is normal.      Breath sounds: Normal breath sounds. No wheezing or rhonchi.   Abdominal:      General: Abdomen is flat. Bowel sounds are normal. There is no distension.      Palpations: Abdomen is soft. There is no mass.      Tenderness: There is no abdominal tenderness. There is no guarding or rebound.      Hernia: No hernia is present.   Musculoskeletal:         General: Normal range of motion.   Skin:     " General: Skin is warm and dry.      Capillary Refill: Capillary refill takes less than 2 seconds.   Neurological:      General: No focal deficit present.      Mental Status: He is alert and oriented to person, place, and time.      Cranial Nerves: No cranial nerve deficit.   Psychiatric:         Mood and Affect: Mood and affect normal.         Behavior: Behavior normal.         Thought Content: Thought content normal.         Judgment: Judgment normal.        Result Review :   The following data was reviewed by: Alfred Church MD on 04/01/2025:  CMP          4/29/2024    09:01   CMP   Glucose 134    BUN 15    Creatinine 0.96    EGFR 82.4    Sodium 138    Potassium 4.3    Chloride 99    Calcium 9.7    Total Protein 7.1    Albumin 4.8    Globulin 2.3    Total Bilirubin 0.6    Alkaline Phosphatase 68    AST (SGOT) 13    ALT (SGPT) 21    Albumin/Globulin Ratio 2.1    BUN/Creatinine Ratio 15.6    Anion Gap 13.0      CBC          4/29/2024    09:01 9/24/2024    08:36   CBC   WBC 7.27  8.15       RBC 4.34  4.27       Hemoglobin 13.5  13.3       Hematocrit 40.3  40.1       MCV 92.9  93.9       MCH 31.1  31.1       MCHC 33.5  33.2       RDW 13.5  13.1       Platelets 178  166          Details          This result is from an external source.             Lipid Panel          4/29/2024    09:01 11/22/2024    10:24   Lipid Panel   Total Cholesterol 153  171    Triglycerides 159  154    HDL Cholesterol 41  35    VLDL Cholesterol 28  27    LDL Cholesterol  84  109    LDL/HDL Ratio 1.96  3.01      TSH          4/29/2024    09:01 11/22/2024    10:24   TSH   TSH 2.790  2.880                Assessment and Plan    Diagnoses and all orders for this visit:    1. Primary hypertension (Primary)  -     hydrALAZINE (APRESOLINE) 10 MG tablet; Take 1 tablet by mouth 3 (Three) Times a Day.  Dispense: 90 tablet; Refill: 5            Follow Up   Return in about 9 weeks (around 6/3/2025) for Recheck, Medicare Wellness.  Patient was given  instructions and counseling regarding his condition or for health maintenance advice. Please see specific information pulled into the AVS if appropriate.

## 2025-04-10 ENCOUNTER — TRANSCRIBE ORDERS (OUTPATIENT)
Dept: ADMINISTRATIVE | Facility: HOSPITAL | Age: 75
End: 2025-04-10
Payer: MEDICARE

## 2025-04-10 DIAGNOSIS — C43.4 MELANOMA OF SCALP: ICD-10-CM

## 2025-04-10 DIAGNOSIS — R94.2 ABNORMAL PET SCAN OF LUNG: Primary | ICD-10-CM

## 2025-04-16 ENCOUNTER — TRANSCRIBE ORDERS (OUTPATIENT)
Dept: ADMINISTRATIVE | Facility: HOSPITAL | Age: 75
End: 2025-04-16
Payer: MEDICARE

## 2025-04-16 DIAGNOSIS — R94.2 ABNORMAL PET SCAN, LUNG: ICD-10-CM

## 2025-04-16 DIAGNOSIS — D89.89: ICD-10-CM

## 2025-04-16 DIAGNOSIS — C43.4 MALIGNANT MELANOMA OF SKIN OF SCALP AND NECK: Primary | ICD-10-CM

## 2025-04-17 ENCOUNTER — TELEPHONE (OUTPATIENT)
Dept: FAMILY MEDICINE CLINIC | Facility: CLINIC | Age: 75
End: 2025-04-17
Payer: MEDICARE

## 2025-04-17 DIAGNOSIS — E66.813 CLASS 3 SEVERE OBESITY DUE TO EXCESS CALORIES WITH SERIOUS COMORBIDITY AND BODY MASS INDEX (BMI) OF 40.0 TO 44.9 IN ADULT: ICD-10-CM

## 2025-04-17 DIAGNOSIS — E66.01 CLASS 3 SEVERE OBESITY DUE TO EXCESS CALORIES WITH SERIOUS COMORBIDITY AND BODY MASS INDEX (BMI) OF 40.0 TO 44.9 IN ADULT: ICD-10-CM

## 2025-04-17 DIAGNOSIS — M19.90 ARTHRITIS: ICD-10-CM

## 2025-04-17 RX ORDER — TIRZEPATIDE 2.5 MG/.5ML
2.5 INJECTION, SOLUTION SUBCUTANEOUS WEEKLY
Qty: 6 ML | Refills: 1 | Status: SHIPPED | OUTPATIENT
Start: 2025-04-17

## 2025-04-17 RX ORDER — TIRZEPATIDE 2.5 MG/.5ML
2.5 INJECTION, SOLUTION SUBCUTANEOUS WEEKLY
Qty: 6 ML | Refills: 1 | Status: SHIPPED | OUTPATIENT
Start: 2025-04-17 | End: 2025-04-17 | Stop reason: SDUPTHER

## 2025-04-17 NOTE — TELEPHONE ENCOUNTER
Caller: [facility] [address] [phone] [facility] [phone] FX    Relationship: Pharmacy    Best call back number: [phone]    Requested Prescriptions:   Requested Prescriptions     Pending Prescriptions Disp Refills    Mounjaro 2.5 MG/0.5ML solution auto-injector 6 mL 1     Sig: Inject 2.5 mg under the skin into the appropriate area as directed 1 (One) Time Per Week.        Pharmacy where request should be sent: [facility] [address] [phone] [facility] [phone] FX     Last office visit with prescribing clinician: 4/1/2025   Last telemedicine visit with prescribing clinician: Visit date not found   Next office visit with prescribing clinician: 6/3/2025     Additional details provided by patient: PATIENT IS OUT OF THIS MEDICATION. PLEASE SEND NEW PRESCRIPTION WITH REFILLS TO PHARMACY ASAP.    Does the patient have less than a 3 day supply:  [x] Yes  [] No    Would you like a call back once the refill request has been completed: [] Yes [] No    If the office needs to give you a call back, can they leave a voicemail: [] Yes [] No    Dilshad Borrero Rep   04/17/25 10:00 EDT

## 2025-04-21 RX ORDER — CELECOXIB 200 MG/1
200 CAPSULE ORAL DAILY
Qty: 90 CAPSULE | Refills: 0 | Status: SHIPPED | OUTPATIENT
Start: 2025-04-21

## 2025-05-01 ENCOUNTER — HOSPITAL ENCOUNTER (OUTPATIENT)
Dept: CT IMAGING | Facility: HOSPITAL | Age: 75
Discharge: HOME | End: 2025-05-01
Admitting: NURSE PRACTITIONER
Payer: MEDICARE

## 2025-05-01 DIAGNOSIS — D89.89: ICD-10-CM

## 2025-05-01 DIAGNOSIS — R94.2 ABNORMAL PET SCAN, LUNG: ICD-10-CM

## 2025-05-01 DIAGNOSIS — C43.4 MALIGNANT MELANOMA OF SKIN OF SCALP AND NECK: ICD-10-CM

## 2025-05-01 DIAGNOSIS — R94.2 ABNORMAL PET SCAN OF LUNG: ICD-10-CM

## 2025-05-01 DIAGNOSIS — C43.4 MELANOMA OF SCALP: ICD-10-CM

## 2025-05-01 PROCEDURE — 70491 CT SOFT TISSUE NECK W/DYE: CPT

## 2025-05-01 PROCEDURE — 70470 CT HEAD/BRAIN W/O & W/DYE: CPT

## 2025-05-01 PROCEDURE — 25510000001 IOPAMIDOL PER 1 ML: Performed by: NURSE PRACTITIONER

## 2025-05-01 PROCEDURE — 71260 CT THORAX DX C+: CPT

## 2025-05-01 PROCEDURE — 74177 CT ABD & PELVIS W/CONTRAST: CPT

## 2025-05-01 RX ORDER — IOPAMIDOL 755 MG/ML
150 INJECTION, SOLUTION INTRAVASCULAR
Status: COMPLETED | OUTPATIENT
Start: 2025-05-01 | End: 2025-05-01

## 2025-05-01 RX ADMIN — IOPAMIDOL 150 ML: 755 INJECTION, SOLUTION INTRAVENOUS at 09:26

## 2025-05-05 ENCOUNTER — TELEPHONE (OUTPATIENT)
Dept: FAMILY MEDICINE CLINIC | Facility: CLINIC | Age: 75
End: 2025-05-05

## 2025-05-05 DIAGNOSIS — E66.813 CLASS 3 SEVERE OBESITY DUE TO EXCESS CALORIES WITH SERIOUS COMORBIDITY AND BODY MASS INDEX (BMI) OF 40.0 TO 44.9 IN ADULT: Primary | ICD-10-CM

## 2025-05-05 DIAGNOSIS — E66.01 CLASS 3 SEVERE OBESITY DUE TO EXCESS CALORIES WITH SERIOUS COMORBIDITY AND BODY MASS INDEX (BMI) OF 40.0 TO 44.9 IN ADULT: Primary | ICD-10-CM

## 2025-05-05 NOTE — TELEPHONE ENCOUNTER
"Caller: Arley Falk \"CARMINA\"    Relationship: Self    Best call back number:     [phone]     What medication are you requesting: NEXT DOSE OF MOUNJARO    Have you had these symptoms before:    [] Yes  [] No    Have you been treated for these symptoms before:   [] Yes  [] No    If a prescription is needed, what is your preferred pharmacy and phone number: [facility] [address] [phone] [facility] [phone]      Additional notes: PATIENT STATES THAT HE TAKES HIS NEXT SHOT NEXT SUNDAY  "

## 2025-05-06 ENCOUNTER — TELEPHONE (OUTPATIENT)
Dept: FAMILY MEDICINE CLINIC | Facility: CLINIC | Age: 75
End: 2025-05-06

## 2025-05-06 DIAGNOSIS — E66.01 CLASS 3 SEVERE OBESITY DUE TO EXCESS CALORIES WITH SERIOUS COMORBIDITY AND BODY MASS INDEX (BMI) OF 40.0 TO 44.9 IN ADULT: ICD-10-CM

## 2025-05-06 DIAGNOSIS — R73.03 PREDIABETES: Primary | ICD-10-CM

## 2025-05-06 DIAGNOSIS — E66.813 CLASS 3 SEVERE OBESITY DUE TO EXCESS CALORIES WITH SERIOUS COMORBIDITY AND BODY MASS INDEX (BMI) OF 40.0 TO 44.9 IN ADULT: ICD-10-CM

## 2025-05-06 NOTE — TELEPHONE ENCOUNTER
Pharmacy Name: [facility] - [place], [place] - [address] [place] [address] - [phone]  - [phone] FX     What medication are you calling in regards to: Tirzepatide 5 MG/0.5ML solution auto-injector     What question does the pharmacy have: PRESCRIPTION NEEDS TO BE RESENT WITH THE CODE FOR DIABETES OR NO CODE AT ALL SO IT WILL BE COVERED BY INSURANCE THEY WILL NOT COVER IT FOR OBESITY   CODE E11.10 WILL WORK     Who is the provider that prescribed the medication: DR. HARRIS

## 2025-06-03 ENCOUNTER — OFFICE VISIT (OUTPATIENT)
Dept: FAMILY MEDICINE CLINIC | Facility: CLINIC | Age: 75
End: 2025-06-03
Payer: MEDICARE

## 2025-06-03 VITALS
HEIGHT: 68 IN | DIASTOLIC BLOOD PRESSURE: 78 MMHG | OXYGEN SATURATION: 99 % | BODY MASS INDEX: 42.72 KG/M2 | HEART RATE: 77 BPM | SYSTOLIC BLOOD PRESSURE: 128 MMHG | TEMPERATURE: 96.9 F | WEIGHT: 281.9 LBS

## 2025-06-03 DIAGNOSIS — E78.2 MIXED HYPERLIPIDEMIA: ICD-10-CM

## 2025-06-03 DIAGNOSIS — J30.89 NON-SEASONAL ALLERGIC RHINITIS, UNSPECIFIED TRIGGER: ICD-10-CM

## 2025-06-03 DIAGNOSIS — E66.01 CLASS 3 SEVERE OBESITY DUE TO EXCESS CALORIES WITH SERIOUS COMORBIDITY AND BODY MASS INDEX (BMI) OF 40.0 TO 44.9 IN ADULT: ICD-10-CM

## 2025-06-03 DIAGNOSIS — I10 PRIMARY HYPERTENSION: ICD-10-CM

## 2025-06-03 DIAGNOSIS — N32.81 OVERACTIVE BLADDER: ICD-10-CM

## 2025-06-03 DIAGNOSIS — R73.03 PREDIABETES: Primary | ICD-10-CM

## 2025-06-03 DIAGNOSIS — E66.813 CLASS 3 SEVERE OBESITY DUE TO EXCESS CALORIES WITH SERIOUS COMORBIDITY AND BODY MASS INDEX (BMI) OF 40.0 TO 44.9 IN ADULT: ICD-10-CM

## 2025-06-03 DIAGNOSIS — M19.90 ARTHRITIS: ICD-10-CM

## 2025-06-03 DIAGNOSIS — N40.0 BENIGN PROSTATIC HYPERPLASIA WITHOUT LOWER URINARY TRACT SYMPTOMS: ICD-10-CM

## 2025-06-03 DIAGNOSIS — R53.83 FATIGUE, UNSPECIFIED TYPE: ICD-10-CM

## 2025-06-03 PROBLEM — J30.9 ALLERGIC RHINITIS: Status: ACTIVE | Noted: 2022-03-08

## 2025-06-03 PROBLEM — N42.9 DISORDER OF PROSTATE: Status: ACTIVE | Noted: 2023-09-18

## 2025-06-03 PROBLEM — C43.4 MALIGNANT MELANOMA OF SCALP: Status: ACTIVE | Noted: 2024-08-29

## 2025-06-03 PROBLEM — Z86.0100 HISTORY OF COLONIC POLYPS: Status: ACTIVE | Noted: 2023-11-28

## 2025-06-03 PROBLEM — D89.89 OTHER SPECIFIED DISORDERS INVOLVING THE IMMUNE MECHANISM, NOT ELSEWHERE CLASSIFIED: Status: ACTIVE | Noted: 2025-02-12

## 2025-06-03 PROBLEM — M79.89 SWELLING OF LIMB: Status: ACTIVE | Noted: 2023-09-18

## 2025-06-03 PROBLEM — R60.9 EDEMA: Status: ACTIVE | Noted: 2025-02-06

## 2025-06-03 LAB
CHOLEST SERPL-MCNC: 142 MG/DL (ref 0–200)
FOLATE SERPL-MCNC: 6.45 NG/ML (ref 4.78–24.2)
HBA1C MFR BLD: 5.5 % (ref 4.8–5.6)
HDLC SERPL-MCNC: 38 MG/DL (ref 40–60)
LDLC SERPL CALC-MCNC: 81 MG/DL (ref 0–100)
LDLC/HDLC SERPL: 2.07 {RATIO}
TRIGL SERPL-MCNC: 126 MG/DL (ref 0–150)
VIT B12 BLD-MCNC: 266 PG/ML (ref 211–946)
VLDLC SERPL-MCNC: 23 MG/DL (ref 5–40)

## 2025-06-03 PROCEDURE — 1159F MED LIST DOCD IN RCRD: CPT | Performed by: FAMILY MEDICINE

## 2025-06-03 PROCEDURE — 1125F AMNT PAIN NOTED PAIN PRSNT: CPT | Performed by: FAMILY MEDICINE

## 2025-06-03 PROCEDURE — 83036 HEMOGLOBIN GLYCOSYLATED A1C: CPT | Performed by: FAMILY MEDICINE

## 2025-06-03 PROCEDURE — 3078F DIAST BP <80 MM HG: CPT | Performed by: FAMILY MEDICINE

## 2025-06-03 PROCEDURE — 1170F FXNL STATUS ASSESSED: CPT | Performed by: FAMILY MEDICINE

## 2025-06-03 PROCEDURE — 1160F RVW MEDS BY RX/DR IN RCRD: CPT | Performed by: FAMILY MEDICINE

## 2025-06-03 PROCEDURE — G0439 PPPS, SUBSEQ VISIT: HCPCS | Performed by: FAMILY MEDICINE

## 2025-06-03 PROCEDURE — 82746 ASSAY OF FOLIC ACID SERUM: CPT | Performed by: FAMILY MEDICINE

## 2025-06-03 PROCEDURE — 36415 COLL VENOUS BLD VENIPUNCTURE: CPT | Performed by: FAMILY MEDICINE

## 2025-06-03 PROCEDURE — 80061 LIPID PANEL: CPT | Performed by: FAMILY MEDICINE

## 2025-06-03 PROCEDURE — 82607 VITAMIN B-12: CPT | Performed by: FAMILY MEDICINE

## 2025-06-03 PROCEDURE — 3074F SYST BP LT 130 MM HG: CPT | Performed by: FAMILY MEDICINE

## 2025-06-03 PROCEDURE — 96160 PT-FOCUSED HLTH RISK ASSMT: CPT | Performed by: FAMILY MEDICINE

## 2025-06-03 RX ORDER — VIBEGRON 75 MG/1
75 TABLET, FILM COATED ORAL DAILY
Qty: 90 TABLET | Refills: 1 | Status: SHIPPED | OUTPATIENT
Start: 2025-06-03

## 2025-06-03 RX ORDER — LISINOPRIL AND HYDROCHLOROTHIAZIDE 12.5; 2 MG/1; MG/1
2 TABLET ORAL DAILY
Qty: 180 TABLET | Refills: 1 | Status: SHIPPED | OUTPATIENT
Start: 2025-06-03

## 2025-06-03 RX ORDER — TAMSULOSIN HYDROCHLORIDE 0.4 MG/1
0.4 CAPSULE ORAL DAILY
Qty: 90 CAPSULE | Refills: 1 | Status: SHIPPED | OUTPATIENT
Start: 2025-06-03

## 2025-06-03 RX ORDER — CELECOXIB 200 MG/1
200 CAPSULE ORAL DAILY
Qty: 90 CAPSULE | Refills: 1 | Status: SHIPPED | OUTPATIENT
Start: 2025-06-03

## 2025-06-03 RX ORDER — CHLORAL HYDRATE 500 MG
2000 CAPSULE ORAL 2 TIMES DAILY WITH MEALS
Qty: 360 CAPSULE | Refills: 3 | Status: SHIPPED | OUTPATIENT
Start: 2025-06-03

## 2025-06-03 RX ORDER — HYDRALAZINE HYDROCHLORIDE 10 MG/1
10 TABLET, FILM COATED ORAL 3 TIMES DAILY
Qty: 270 TABLET | Refills: 1 | Status: SHIPPED | OUTPATIENT
Start: 2025-06-03

## 2025-06-03 RX ORDER — MONTELUKAST SODIUM 10 MG/1
10 TABLET ORAL DAILY
Qty: 90 TABLET | Refills: 1 | Status: SHIPPED | OUTPATIENT
Start: 2025-06-03

## 2025-06-03 NOTE — PROGRESS NOTES
..  Venipuncture Blood Specimen Collection  Venipuncture performed in LT arm by Sandra Mcclain with good hemostasis. Patient tolerated the procedure well without complications.   06/03/25   Abby Plascencia MA

## 2025-06-03 NOTE — ASSESSMENT & PLAN NOTE
Lipid abnormalities are stable    Plan:  Continue same medication/s without change.      Discussed medication dosage, use, side effects, and goals of treatment in detail.    Counseled patient on lifestyle modifications to help control hyperlipidemia.     Patient Treatment Goals:   LDL goal is less than 70    Followup in 6 months.    Orders:    Omega-3 Fatty Acids (fish oil) 1000 MG capsule capsule; Take 2 capsules by mouth 2 (Two) Times a Day With Meals.    Lipid Panel

## 2025-06-03 NOTE — PROGRESS NOTES
Subjective   The ABCs of the Annual Wellness Visit  Medicare Wellness Visit      Arley Falk is a 74 y.o. patient who presents for a Medicare Wellness Visit.    The following portions of the patient's history were reviewed and   updated as appropriate: He  has a past medical history of Allergic, Gout, Hyperlipidemia, Hypertension, and Sleep apnea.  He does not have any pertinent problems on file.  He  has a past surgical history that includes Colonoscopy (06/2020) and Colonoscopy (N/A, 2/7/2024).  His family history includes Depression in his mother; Diabetes in his mother; Heart defect in his father; Kidney disease in his sister.  He  reports that he quit smoking about 37 years ago. His smoking use included cigarettes. He started smoking about 58 years ago. He has a 50 pack-year smoking history. He has never been exposed to tobacco smoke. He quit smokeless tobacco use about 7 years ago. He reports current alcohol use of about 2.0 standard drinks of alcohol per week. He reports that he does not use drugs.  Current Outpatient Medications   Medication Sig Dispense Refill    allopurinol (ZYLOPRIM) 300 MG tablet Take 1 tablet by mouth Daily.      celecoxib (CeleBREX) 200 MG capsule Take 1 capsule by mouth Daily. 90 capsule 1    hydrALAZINE (APRESOLINE) 10 MG tablet Take 1 tablet by mouth 3 (Three) Times a Day. 270 tablet 1    lisinopril-hydrochlorothiazide (PRINZIDE,ZESTORETIC) 20-12.5 MG per tablet Take 2 tablets by mouth Daily. 180 tablet 1    montelukast (SINGULAIR) 10 MG tablet Take 1 tablet by mouth Daily. 90 tablet 1    Omega-3 Fatty Acids (fish oil) 1000 MG capsule capsule Take 2 capsules by mouth 2 (Two) Times a Day With Meals. 360 capsule 3    tamsulosin (FLOMAX) 0.4 MG capsule 24 hr capsule Take 1 capsule by mouth Daily. 90 capsule 1    Vibegron (Gemtesa) 75 MG tablet Take 1 tablet by mouth Daily. 90 tablet 1    vitamin E 600 UNIT capsule vitamin E 600 unit oral capsule take 1 capsule by oral route  daily   Active      Tirzepatide 2.5 MG/0.5ML solution auto-injector Inject 2.5 mg under the skin into the appropriate area as directed 1 (One) Time Per Week. 2 mL 0     No current facility-administered medications for this visit.     Current Outpatient Medications on File Prior to Visit   Medication Sig    allopurinol (ZYLOPRIM) 300 MG tablet Take 1 tablet by mouth Daily.    vitamin E 600 UNIT capsule vitamin E 600 unit oral capsule take 1 capsule by oral route daily   Active    [DISCONTINUED] celecoxib (CeleBREX) 200 MG capsule Take 1 capsule by mouth Daily.    [DISCONTINUED] hydrALAZINE (APRESOLINE) 10 MG tablet Take 1 tablet by mouth 3 (Three) Times a Day.    [DISCONTINUED] lisinopril-hydrochlorothiazide (PRINZIDE,ZESTORETIC) 20-12.5 MG per tablet Take 2 tablets by mouth Daily.    [DISCONTINUED] montelukast (SINGULAIR) 10 MG tablet Take 1 tablet by mouth Daily.    [DISCONTINUED] Omega-3 Fatty Acids (fish oil) 1000 MG capsule capsule Take 2 capsules by mouth 2 (Two) Times a Day With Meals.    [DISCONTINUED] tamsulosin (FLOMAX) 0.4 MG capsule 24 hr capsule Take 1 capsule by mouth Daily.    [DISCONTINUED] Tirzepatide 5 MG/0.5ML solution auto-injector Inject 5 mg under the skin into the appropriate area as directed 1 (One) Time Per Week.    [DISCONTINUED] Vibegron (Gemtesa) 75 MG tablet Take 1 tablet by mouth Daily.     No current facility-administered medications on file prior to visit.     He has no known allergies..    Compared to one year ago, the patient's physical   health is the same.  Compared to one year ago, the patient's mental   health is the same.    Recent Hospitalizations:  He was not admitted to the hospital during the last year.     Current Medical Providers:  Patient Care Team:  Alfred Church MD as PCP - General (Family Medicine)    Outpatient Medications Prior to Visit   Medication Sig Dispense Refill    allopurinol (ZYLOPRIM) 300 MG tablet Take 1 tablet by mouth Daily.      vitamin E 600  UNIT capsule vitamin E 600 unit oral capsule take 1 capsule by oral route daily   Active      celecoxib (CeleBREX) 200 MG capsule Take 1 capsule by mouth Daily. 90 capsule 0    hydrALAZINE (APRESOLINE) 10 MG tablet Take 1 tablet by mouth 3 (Three) Times a Day. 90 tablet 5    lisinopril-hydrochlorothiazide (PRINZIDE,ZESTORETIC) 20-12.5 MG per tablet Take 2 tablets by mouth Daily. 180 tablet 1    montelukast (SINGULAIR) 10 MG tablet Take 1 tablet by mouth Daily. 90 tablet 1    Omega-3 Fatty Acids (fish oil) 1000 MG capsule capsule Take 2 capsules by mouth 2 (Two) Times a Day With Meals. 360 capsule 3    tamsulosin (FLOMAX) 0.4 MG capsule 24 hr capsule Take 1 capsule by mouth Daily. 90 capsule 1    Tirzepatide 5 MG/0.5ML solution auto-injector Inject 5 mg under the skin into the appropriate area as directed 1 (One) Time Per Week. 2 mL 1    Vibegron (Gemtesa) 75 MG tablet Take 1 tablet by mouth Daily. 90 tablet 1     No facility-administered medications prior to visit.     No opioid medication identified on active medication list. I have reviewed chart for other potential  high risk medication/s and harmful drug interactions in the elderly.      Aspirin is not on active medication list.  Aspirin use is not indicated based on review of current medical condition/s. Risk of harm outweighs potential benefits.  .    Patient Active Problem List   Diagnosis    Enlarged prostate    Gout    Hypercholesterolemia    Primary hypertension    Non-seasonal allergic rhinitis    Sleep apnea    Arthralgia of both knees    Limb swelling    Pes anserinus bursitis    Prostate disorder    Hearing loss    Pain of both shoulder joints    Former tobacco use    Class 3 severe obesity due to excess calories with serious comorbidity and body mass index (BMI) of 40.0 to 44.9 in adult    History of colon polyps    Mixed hyperlipidemia    Prediabetes    Edema    Malignant melanoma of scalp    Other specified disorders involving the immune mechanism,  "not elsewhere classified    Severe obesity    History of colonic polyps    Swelling of limb    Allergic rhinitis    Disorder of prostate     Advance Care Planning Advance Directive is not on file.  ACP discussion was held with the patient during this visit. Patient does not have an advance directive, information provided.            Objective   Vitals:    25 0843   BP: 128/78   BP Location: Left arm   Patient Position: Sitting   Cuff Size: Large Adult   Pulse: 77   Temp: 96.9 °F (36.1 °C)   TempSrc: Temporal   SpO2: 99%   Weight: 128 kg (281 lb 14.4 oz)   Height: 172.7 cm (68\")   PainSc: 7    PainLoc: Generalized       Estimated body mass index is 42.86 kg/m² as calculated from the following:    Height as of this encounter: 172.7 cm (68\").    Weight as of this encounter: 128 kg (281 lb 14.4 oz).    Class 3 Severe Obesity (BMI >=40). Obesity-related health conditions include the following: hypertension, dyslipidemias, and GERD. Obesity is improving with lifestyle modifications. BMI is is above average; BMI management plan is completed. We discussed portion control and increasing exercise.           Does the patient have evidence of cognitive impairment? No                                                                                                Health  Risk Assessment    Smoking Status:  Social History     Tobacco Use   Smoking Status Former    Current packs/day: 0.00    Average packs/day: 1 pack/day for 50.0 years (50.0 ttl pk-yrs)    Types: Cigarettes    Start date:     Quit date: 10/18/1987    Years since quittin.6    Passive exposure: Never   Smokeless Tobacco Former    Quit date: 10/18/2017   Tobacco Comments    quit      Alcohol Consumption:  Social History     Substance and Sexual Activity   Alcohol Use Yes    Alcohol/week: 2.0 standard drinks of alcohol    Types: 2 Cans of beer per week       Fall Risk Screen  STEADI Fall Risk Assessment was completed, and patient is at LOW risk for " falls.Assessment completed on:6/3/2025    Depression Screening   Little interest or pleasure in doing things? Not at all   Feeling down, depressed, or hopeless? Not at all   PHQ-2 Total Score 0      Health Habits and Functional and Cognitive Screenin/3/2025     8:41 AM   Functional & Cognitive Status   Do you have difficulty preparing food and eating? No   Do you have difficulty bathing yourself, getting dressed or grooming yourself? No   Do you have difficulty using the toilet? No   Do you have difficulty moving around from place to place? No   Do you have trouble with steps or getting out of a bed or a chair? No   Current Diet Other   Dental Exam Not up to date   Eye Exam Up to date   Exercise (times per week) 0 times per week   Current Exercises Include No Regular Exercise   Do you need help using the phone?  No   Are you deaf or do you have serious difficulty hearing?  Yes   Do you need help to go to places out of walking distance? No   Do you need help shopping? No   Do you need help preparing meals?  No   Do you need help with housework?  No   Do you need help with laundry? No   Do you need help taking your medications? No   Do you need help managing money? No   Do you ever drive or ride in a car without wearing a seat belt? No   Have you felt unusual stress, anger or loneliness in the last month? No   Who do you live with? Spouse   If you need help, do you have trouble finding someone available to you? No   Have you been bothered in the last four weeks by sexual problems? No   Do you have difficulty concentrating, remembering or making decisions? No           Age-appropriate Screening Schedule:  Refer to the list below for future screening recommendations based on patient's age, sex and/or medical conditions. Orders for these recommended tests are listed in the plan section. The patient has been provided with a written plan.    Health Maintenance List  Health Maintenance   Topic Date Due    RSV  Vaccine - Adults (1 - 1-dose 75+ series) 06/03/2026 (Originally 9/13/2023)    ZOSTER VACCINE (1 of 2) 06/03/2026 (Originally 9/13/1998)    INFLUENZA VACCINE  07/01/2025    COVID-19 Vaccine (7 - 2024-25 season) 09/18/2025    LIPID PANEL  11/22/2025    ANNUAL WELLNESS VISIT  06/03/2026    TDAP/TD VACCINES (2 - Td or Tdap) 02/25/2031    HEPATITIS C SCREENING  Completed    Pneumococcal Vaccine 50+  Completed    LUNG CANCER SCREENING  Discontinued    COLORECTAL CANCER SCREENING  Discontinued                                                                                                                                                CMS Preventative Services Quick Reference  Risk Factors Identified During Encounter  Immunizations Discussed/Encouraged: Shingrix and RSV (Respiratory Syncytial Virus)  Inactivity/Sedentary: Patient was advised to exercise at least 150 minutes a week per CDC recommendations.    The above risks/problems have been discussed with the patient.  Pertinent information has been shared with the patient in the After Visit Summary.  An After Visit Summary and PPPS were made available to the patient.    Follow Up:   Next Medicare Wellness visit to be scheduled in 1 year.         Additional E&M Note during same encounter follows:  Patient has additional, significant, and separately identifiable condition(s)/problem(s) that require work above and beyond the Medicare Wellness Visit     Chief Complaint  Medicare Wellness-subsequent (Pt did have some labs 5/7/25), Hypertension (Pt presents for medication refills today), Allergic Rhinitis, Hyperlipidemia, and Benign Prostatic Hypertrophy    Subjective   Pt is tolerating mounjaro and losing weight  discussed R&B of these meds: pt has no h/o or FH of thyroid cancers or MEN.  Pt understands risk of chronic nausea, vomiting, gastric paralysis, pancreatitis, gallbladder problems and possible hearing loss, and eyesight, and up to 40% of the weight loss may be muscle  "mass, and kidney problems.  Pt also understands that these meds can cause kidney problems.  Pt will call me and stop med if he has any nausea or vomiting.  Discussed risk of aspirations with anesthesia and need to be off med for 10-14 days before getting anesthesia.  Pt understands risks and wants to continue mounjaro med.              Review of Systems   Constitutional:  Negative for fatigue and fever.   HENT:  Negative for sore throat.    Eyes:  Negative for visual disturbance.   Respiratory:  Negative for apnea, cough, shortness of breath and wheezing.    Cardiovascular:  Negative for chest pain, palpitations and leg swelling.   Gastrointestinal:  Negative for abdominal pain, diarrhea, nausea and vomiting.   Neurological:  Negative for dizziness and light-headedness.              Objective   Vital Signs:  /78 (BP Location: Left arm, Patient Position: Sitting, Cuff Size: Large Adult)   Pulse 77   Temp 96.9 °F (36.1 °C) (Temporal)   Ht 172.7 cm (68\")   Wt 128 kg (281 lb 14.4 oz)   SpO2 99%   BMI 42.86 kg/m²   Physical Exam  Vitals reviewed.   Constitutional:       Appearance: Normal appearance. He is well-developed.   HENT:      Head: Normocephalic and atraumatic.      Right Ear: External ear normal.      Left Ear: External ear normal.      Mouth/Throat:      Pharynx: No oropharyngeal exudate.   Eyes:      Conjunctiva/sclera: Conjunctivae normal.      Pupils: Pupils are equal, round, and reactive to light.   Cardiovascular:      Rate and Rhythm: Normal rate and regular rhythm.      Pulses: Normal pulses.      Heart sounds: Normal heart sounds. No murmur heard.     No friction rub. No gallop.   Pulmonary:      Effort: Pulmonary effort is normal.      Breath sounds: Normal breath sounds. No wheezing or rhonchi.   Abdominal:      General: Abdomen is flat. Bowel sounds are normal. There is no distension.      Palpations: Abdomen is soft. There is no mass.      Tenderness: There is no abdominal tenderness. " There is no guarding or rebound.      Hernia: No hernia is present.   Musculoskeletal:         General: Normal range of motion.   Skin:     General: Skin is warm and dry.      Capillary Refill: Capillary refill takes less than 2 seconds.   Neurological:      General: No focal deficit present.      Mental Status: He is alert and oriented to person, place, and time.      Cranial Nerves: No cranial nerve deficit.   Psychiatric:         Mood and Affect: Mood and affect normal.         Behavior: Behavior normal.         Thought Content: Thought content normal.         Judgment: Judgment normal.         The following data was reviewed by: Alfred Church MD on 06/03/2025:      CBC          9/24/2024    08:36   CBC   WBC 8.15       RBC 4.27       Hemoglobin 13.3       Hematocrit 40.1       MCV 93.9       MCH 31.1       MCHC 33.2       RDW 13.1       Platelets 166          Details          This result is from an external source.             Lipid Panel          11/22/2024    10:24   Lipid Panel   Total Cholesterol 171    Triglycerides 154    HDL Cholesterol 35    VLDL Cholesterol 27    LDL Cholesterol  109    LDL/HDL Ratio 3.01      TSH          11/22/2024    10:24   TSH   TSH 2.880      Most Recent A1C          11/22/2024    10:24   HGBA1C Most Recent   Hemoglobin A1C 6.10        PSA          11/22/2024    10:24   PSA   PSA 3.310           Assessment and Plan Additional age appropriate preventative wellness advice topics were discussed during today's preventative wellness exam(some topics already addressed during AWV portion of the note above):   Nutrition: Discussed nutrition plan with patient. Information shared in after visit summary. Goal is for a well balanced diet to enhance overall health.     Primary hypertension  Hypertension is stable and controlled  Continue current treatment regimen.  Blood pressure will be reassessed in 6 months.    Orders:    hydrALAZINE (APRESOLINE) 10 MG tablet; Take 1 tablet by mouth 3  (Three) Times a Day.    lisinopril-hydrochlorothiazide (PRINZIDE,ZESTORETIC) 20-12.5 MG per tablet; Take 2 tablets by mouth Daily.    Non-seasonal allergic rhinitis, unspecified trigger    Orders:    montelukast (SINGULAIR) 10 MG tablet; Take 1 tablet by mouth Daily.    Mixed hyperlipidemia   Lipid abnormalities are stable    Plan:  Continue same medication/s without change.      Discussed medication dosage, use, side effects, and goals of treatment in detail.    Counseled patient on lifestyle modifications to help control hyperlipidemia.     Patient Treatment Goals:   LDL goal is less than 70    Followup in 6 months.    Orders:    Omega-3 Fatty Acids (fish oil) 1000 MG capsule capsule; Take 2 capsules by mouth 2 (Two) Times a Day With Meals.    Lipid Panel    Benign prostatic hyperplasia without lower urinary tract symptoms    Orders:    tamsulosin (FLOMAX) 0.4 MG capsule 24 hr capsule; Take 1 capsule by mouth Daily.    Overactive bladder    Orders:    Vibegron (Gemtesa) 75 MG tablet; Take 1 tablet by mouth Daily.    Prediabetes    Orders:    Tirzepatide 2.5 MG/0.5ML solution auto-injector; Inject 2.5 mg under the skin into the appropriate area as directed 1 (One) Time Per Week.    Hemoglobin A1c    Arthritis    Orders:    celecoxib (CeleBREX) 200 MG capsule; Take 1 capsule by mouth Daily.    Class 3 severe obesity due to excess calories with serious comorbidity and body mass index (BMI) of 40.0 to 44.9 in adult  Patient's (Body mass index is 42.86 kg/m².) indicates that they are morbidly/severely obese (BMI > 40 or > 35 with obesity - related health condition) with health conditions that include hypertension, dyslipidemias, and GERD . Weight is improving with lifestyle modifications. BMI  is above average; BMI management plan is completed. We discussed portion control and increasing exercise.     Orders:    Tirzepatide 2.5 MG/0.5ML solution auto-injector; Inject 2.5 mg under the skin into the appropriate area as  directed 1 (One) Time Per Week.    Fatigue, unspecified type    Orders:    Vitamin B12 & Folate            Follow Up   Return in about 6 months (around 12/3/2025) for Recheck.  Patient was given instructions and counseling regarding his condition or for health maintenance advice. Please see specific information pulled into the AVS if appropriate.

## 2025-06-03 NOTE — ASSESSMENT & PLAN NOTE
Patient's (Body mass index is 42.86 kg/m².) indicates that they are morbidly/severely obese (BMI > 40 or > 35 with obesity - related health condition) with health conditions that include hypertension, dyslipidemias, and GERD . Weight is improving with lifestyle modifications. BMI  is above average; BMI management plan is completed. We discussed portion control and increasing exercise.     Orders:    Tirzepatide 2.5 MG/0.5ML solution auto-injector; Inject 2.5 mg under the skin into the appropriate area as directed 1 (One) Time Per Week.

## 2025-06-03 NOTE — ASSESSMENT & PLAN NOTE
Orders:    Tirzepatide 2.5 MG/0.5ML solution auto-injector; Inject 2.5 mg under the skin into the appropriate area as directed 1 (One) Time Per Week.    Hemoglobin A1c

## 2025-06-03 NOTE — ASSESSMENT & PLAN NOTE
Hypertension is stable and controlled  Continue current treatment regimen.  Blood pressure will be reassessed in 6 months.    Orders:    hydrALAZINE (APRESOLINE) 10 MG tablet; Take 1 tablet by mouth 3 (Three) Times a Day.    lisinopril-hydrochlorothiazide (PRINZIDE,ZESTORETIC) 20-12.5 MG per tablet; Take 2 tablets by mouth Daily.

## 2025-06-05 DIAGNOSIS — E53.8 VITAMIN B12 DEFICIENCY: Primary | ICD-10-CM

## 2025-06-05 RX ORDER — CYANOCOBALAMIN 1000 UG/ML
1000 INJECTION, SOLUTION INTRAMUSCULAR; SUBCUTANEOUS
Qty: 3 ML | Refills: 3 | Status: SHIPPED | OUTPATIENT
Start: 2025-06-05

## 2025-06-12 ENCOUNTER — TELEPHONE (OUTPATIENT)
Dept: FAMILY MEDICINE CLINIC | Facility: CLINIC | Age: 75
End: 2025-06-12
Payer: MEDICARE

## 2025-06-12 DIAGNOSIS — E53.8 VITAMIN B12 DEFICIENCY: ICD-10-CM

## 2025-06-12 RX ORDER — ALLOPURINOL 300 MG/1
300 TABLET ORAL DAILY
Qty: 90 TABLET | Refills: 0 | Status: SHIPPED | OUTPATIENT
Start: 2025-06-12

## 2025-06-12 RX ORDER — CYANOCOBALAMIN 1000 UG/ML
1000 INJECTION, SOLUTION INTRAMUSCULAR; SUBCUTANEOUS
Qty: 3 ML | Refills: 3 | Status: SHIPPED | OUTPATIENT
Start: 2025-06-12

## 2025-06-12 NOTE — TELEPHONE ENCOUNTER
Caller: [facility] of [address] - [phone] [facility] [phone] FX    Relationship: Pharmacy    Best call back number: [phone]     Requested Prescriptions:   Requested Prescriptions     Pending Prescriptions Disp Refills    allopurinol (ZYLOPRIM) 300 MG tablet       Sig: Take 1 tablet by mouth Daily.        Pharmacy where request should be sent: [facility] OF [address] - [phone] [facility] [phone] FX     Last office visit with prescribing clinician: 6/3/2025   Last telemedicine visit with prescribing clinician: Visit date not found   Next office visit with prescribing clinician: 12/4/2025     Additional details provided by patient: PHARMACY CALLED IN STATING THE PATIENT IS NEEDING REFILLS FOR THIS  MEDICATION. PLEASE ADVISE     Does the patient have less than a 3 day supply:  [x] Yes  [] No    Dilshad Roberts Rep   06/12/25 16:01 EDT

## 2025-06-12 NOTE — TELEPHONE ENCOUNTER
"  Caller: Arley Falk \"CARMINA\"    Relationship: Self    Best call back number: [phone]    What is the best time to reach you: ANYTIME    Who are you requesting to speak with (clinical staff, provider,  specific staff member): NURSE     Do you know the name of the person who called:     What was the call regarding: PATIENT IS CALLING REGARDING HIS B12 THAT WAS TO BE CALLED IN, HE CALLED PHARMACY AND THEY STILL HAVE NOT RECEIVED IT.  PLEASE SEND TO [facility] [place] AND THEN LET PATIENT KNOW WHEN IT HAS BEEN SENT.     "

## 2025-07-10 ENCOUNTER — CLINICAL SUPPORT (OUTPATIENT)
Dept: FAMILY MEDICINE CLINIC | Facility: CLINIC | Age: 75
End: 2025-07-10
Payer: MEDICARE

## 2025-07-10 DIAGNOSIS — E53.8 VITAMIN B12 DEFICIENCY: Primary | ICD-10-CM

## 2025-07-10 PROCEDURE — 96372 THER/PROPH/DIAG INJ SC/IM: CPT | Performed by: FAMILY MEDICINE

## 2025-07-10 RX ORDER — CYANOCOBALAMIN 1000 UG/ML
1000 INJECTION, SOLUTION INTRAMUSCULAR; SUBCUTANEOUS
Status: SHIPPED | OUTPATIENT
Start: 2025-07-10

## 2025-07-10 RX ADMIN — CYANOCOBALAMIN 1000 MCG: 1000 INJECTION, SOLUTION INTRAMUSCULAR; SUBCUTANEOUS at 17:22

## 2025-07-31 ENCOUNTER — TRANSCRIBE ORDERS (OUTPATIENT)
Dept: ADMINISTRATIVE | Facility: HOSPITAL | Age: 75
End: 2025-07-31
Payer: MEDICARE

## 2025-07-31 DIAGNOSIS — R94.2 ABNORMAL PET SCAN OF LUNG: Primary | ICD-10-CM

## 2025-07-31 DIAGNOSIS — C43.4 MALIGNANT MELANOMA OF SKIN OF SCALP AND NECK: ICD-10-CM

## (undated) DEVICE — SNAR E/S POLYP SNAREMASTER OVL/10MM 2.8X2300MM YEL

## (undated) DEVICE — TRAP POLYP ETRAP 2PK

## (undated) DEVICE — SOLIDIFIER LIQLOC PLS 1500CC BT

## (undated) DEVICE — SOL IRRG H2O PL/BG 1000ML STRL

## (undated) DEVICE — Device

## (undated) DEVICE — KT VLV BIOP DEFENDO SXN AIR/WATER